# Patient Record
Sex: MALE | Race: WHITE | NOT HISPANIC OR LATINO | Employment: FULL TIME | URBAN - NONMETROPOLITAN AREA
[De-identification: names, ages, dates, MRNs, and addresses within clinical notes are randomized per-mention and may not be internally consistent; named-entity substitution may affect disease eponyms.]

---

## 2017-05-12 ENCOUNTER — APPOINTMENT (OUTPATIENT)
Dept: LAB | Facility: HOSPITAL | Age: 42
End: 2017-05-12
Attending: INTERNAL MEDICINE

## 2017-05-12 ENCOUNTER — TRANSCRIBE ORDERS (OUTPATIENT)
Dept: ADMINISTRATIVE | Facility: HOSPITAL | Age: 42
End: 2017-05-12

## 2017-05-12 DIAGNOSIS — Z00.00 ENCOUNTER FOR GENERAL ADULT MEDICAL EXAMINATION WITHOUT ABNORMAL FINDINGS: Primary | ICD-10-CM

## 2017-05-12 LAB
ALBUMIN SERPL-MCNC: 4.2 G/DL (ref 3.5–5)
ALBUMIN/GLOB SERPL: 1.2 G/DL (ref 1.1–2.5)
ALP SERPL-CCNC: 71 U/L (ref 24–120)
ALT SERPL W P-5'-P-CCNC: 83 U/L (ref 0–54)
ANION GAP SERPL CALCULATED.3IONS-SCNC: 12 MMOL/L (ref 4–13)
ARTICHOKE IGE QN: 107 MG/DL (ref 0–99)
AST SERPL-CCNC: 83 U/L (ref 7–45)
BILIRUB SERPL-MCNC: 0.7 MG/DL (ref 0.1–1)
BUN BLD-MCNC: 15 MG/DL (ref 5–21)
BUN/CREAT SERPL: 12.2 (ref 7–25)
CALCIUM SPEC-SCNC: 9.6 MG/DL (ref 8.4–10.4)
CHLORIDE SERPL-SCNC: 107 MMOL/L (ref 98–110)
CHOLEST SERPL-MCNC: 185 MG/DL (ref 130–200)
CO2 SERPL-SCNC: 24 MMOL/L (ref 24–31)
CREAT BLD-MCNC: 1.23 MG/DL (ref 0.5–1.4)
DEPRECATED RDW RBC AUTO: 42.8 FL (ref 40–54)
ERYTHROCYTE [DISTWIDTH] IN BLOOD BY AUTOMATED COUNT: 12.8 % (ref 12–15)
GFR SERPL CREATININE-BSD FRML MDRD: 65 ML/MIN/1.73
GLOBULIN UR ELPH-MCNC: 3.5 GM/DL
GLUCOSE BLD-MCNC: 107 MG/DL (ref 70–100)
HBA1C MFR BLD: 5.5 %
HCT VFR BLD AUTO: 40.8 % (ref 40–52)
HDLC SERPL-MCNC: 28 MG/DL
HGB BLD-MCNC: 14 G/DL (ref 14–18)
LDLC/HDLC SERPL: 4.37 {RATIO}
MCH RBC QN AUTO: 31.8 PG (ref 28–32)
MCHC RBC AUTO-ENTMCNC: 34.3 G/DL (ref 33–36)
MCV RBC AUTO: 92.7 FL (ref 82–95)
PLATELET # BLD AUTO: 197 10*3/MM3 (ref 130–400)
PMV BLD AUTO: 11 FL (ref 6–12)
POTASSIUM BLD-SCNC: 4.3 MMOL/L (ref 3.5–5.3)
PROT SERPL-MCNC: 7.7 G/DL (ref 6.3–8.7)
RBC # BLD AUTO: 4.4 10*6/MM3 (ref 4.8–5.9)
SODIUM BLD-SCNC: 143 MMOL/L (ref 135–145)
T3FREE SERPL-MCNC: 3.57 PG/ML (ref 2.77–5.27)
T4 FREE SERPL-MCNC: 0.64 NG/DL (ref 0.78–2.19)
TRIGL SERPL-MCNC: 173 MG/DL (ref 0–149)
TSH SERPL DL<=0.05 MIU/L-ACNC: 1.8 MIU/ML (ref 0.47–4.68)
WBC NRBC COR # BLD: 7.09 10*3/MM3 (ref 4.8–10.8)

## 2017-05-12 PROCEDURE — 84481 FREE ASSAY (FT-3): CPT | Performed by: INTERNAL MEDICINE

## 2017-05-12 PROCEDURE — 36415 COLL VENOUS BLD VENIPUNCTURE: CPT | Performed by: INTERNAL MEDICINE

## 2017-05-12 PROCEDURE — 85027 COMPLETE CBC AUTOMATED: CPT | Performed by: INTERNAL MEDICINE

## 2017-05-12 PROCEDURE — 80061 LIPID PANEL: CPT | Performed by: INTERNAL MEDICINE

## 2017-05-12 PROCEDURE — 80053 COMPREHEN METABOLIC PANEL: CPT | Performed by: INTERNAL MEDICINE

## 2017-05-12 PROCEDURE — 83036 HEMOGLOBIN GLYCOSYLATED A1C: CPT | Performed by: INTERNAL MEDICINE

## 2017-05-12 PROCEDURE — 84439 ASSAY OF FREE THYROXINE: CPT | Performed by: INTERNAL MEDICINE

## 2017-05-12 PROCEDURE — 84443 ASSAY THYROID STIM HORMONE: CPT | Performed by: INTERNAL MEDICINE

## 2017-12-06 ENCOUNTER — TRANSCRIBE ORDERS (OUTPATIENT)
Dept: ADMINISTRATIVE | Facility: HOSPITAL | Age: 42
End: 2017-12-06

## 2017-12-06 ENCOUNTER — APPOINTMENT (OUTPATIENT)
Dept: LAB | Facility: HOSPITAL | Age: 42
End: 2017-12-06
Attending: INTERNAL MEDICINE

## 2017-12-06 DIAGNOSIS — Z00.01 ENCOUNTER FOR GENERAL ADULT MEDICAL EXAMINATION WITH ABNORMAL FINDINGS: ICD-10-CM

## 2017-12-06 DIAGNOSIS — E29.1 TESTICULAR HYPOFUNCTION: Primary | ICD-10-CM

## 2017-12-06 LAB
ALBUMIN SERPL-MCNC: 4.4 G/DL (ref 3.5–5)
ALBUMIN/GLOB SERPL: 1.3 G/DL (ref 1.1–2.5)
ALP SERPL-CCNC: 68 U/L (ref 24–120)
ALT SERPL W P-5'-P-CCNC: 112 U/L (ref 0–54)
ANION GAP SERPL CALCULATED.3IONS-SCNC: 6 MMOL/L (ref 4–13)
ARTICHOKE IGE QN: 133 MG/DL (ref 0–99)
AST SERPL-CCNC: 95 U/L (ref 7–45)
BILIRUB SERPL-MCNC: 0.6 MG/DL (ref 0.1–1)
BUN BLD-MCNC: 18 MG/DL (ref 5–21)
BUN/CREAT SERPL: 14.2 (ref 7–25)
CALCIUM SPEC-SCNC: 9.6 MG/DL (ref 8.4–10.4)
CHLORIDE SERPL-SCNC: 105 MMOL/L (ref 98–110)
CHOLEST SERPL-MCNC: 191 MG/DL (ref 130–200)
CO2 SERPL-SCNC: 32 MMOL/L (ref 24–31)
CREAT BLD-MCNC: 1.27 MG/DL (ref 0.5–1.4)
DEPRECATED RDW RBC AUTO: 41.9 FL (ref 40–54)
ERYTHROCYTE [DISTWIDTH] IN BLOOD BY AUTOMATED COUNT: 12 % (ref 12–15)
GFR SERPL CREATININE-BSD FRML MDRD: 62 ML/MIN/1.73
GLOBULIN UR ELPH-MCNC: 3.4 GM/DL
GLUCOSE BLD-MCNC: 106 MG/DL (ref 70–100)
HCT VFR BLD AUTO: 38.3 % (ref 40–52)
HDLC SERPL-MCNC: 27 MG/DL
HGB BLD-MCNC: 13.1 G/DL (ref 14–18)
LDLC/HDLC SERPL: 4.62 {RATIO}
MCH RBC QN AUTO: 32.2 PG (ref 28–32)
MCHC RBC AUTO-ENTMCNC: 34.2 G/DL (ref 33–36)
MCV RBC AUTO: 94.1 FL (ref 82–95)
PLATELET # BLD AUTO: 216 10*3/MM3 (ref 130–400)
PMV BLD AUTO: 10.9 FL (ref 6–12)
POTASSIUM BLD-SCNC: 4.4 MMOL/L (ref 3.5–5.3)
PROT SERPL-MCNC: 7.8 G/DL (ref 6.3–8.7)
PSA SERPL-MCNC: 0.64 NG/ML (ref 0–4)
RBC # BLD AUTO: 4.07 10*6/MM3 (ref 4.8–5.9)
SODIUM BLD-SCNC: 143 MMOL/L (ref 135–145)
T3FREE SERPL-MCNC: 3.6 PG/ML (ref 2.77–5.27)
T4 FREE SERPL-MCNC: 0.65 NG/DL (ref 0.78–2.19)
TRIGL SERPL-MCNC: 196 MG/DL (ref 0–149)
TSH SERPL DL<=0.05 MIU/L-ACNC: 2.52 MIU/ML (ref 0.47–4.68)
WBC NRBC COR # BLD: 6.52 10*3/MM3 (ref 4.8–10.8)

## 2017-12-06 PROCEDURE — 84403 ASSAY OF TOTAL TESTOSTERONE: CPT | Performed by: INTERNAL MEDICINE

## 2017-12-06 PROCEDURE — 84481 FREE ASSAY (FT-3): CPT | Performed by: INTERNAL MEDICINE

## 2017-12-06 PROCEDURE — 84443 ASSAY THYROID STIM HORMONE: CPT | Performed by: INTERNAL MEDICINE

## 2017-12-06 PROCEDURE — 84153 ASSAY OF PSA TOTAL: CPT | Performed by: INTERNAL MEDICINE

## 2017-12-06 PROCEDURE — 85027 COMPLETE CBC AUTOMATED: CPT | Performed by: INTERNAL MEDICINE

## 2017-12-06 PROCEDURE — 80061 LIPID PANEL: CPT | Performed by: INTERNAL MEDICINE

## 2017-12-06 PROCEDURE — 84439 ASSAY OF FREE THYROXINE: CPT | Performed by: INTERNAL MEDICINE

## 2017-12-06 PROCEDURE — 80053 COMPREHEN METABOLIC PANEL: CPT | Performed by: INTERNAL MEDICINE

## 2017-12-06 PROCEDURE — 36415 COLL VENOUS BLD VENIPUNCTURE: CPT

## 2017-12-07 LAB — TESTOST SERPL-MCNC: 393 NG/DL (ref 264–916)

## 2017-12-08 ENCOUNTER — TRANSCRIBE ORDERS (OUTPATIENT)
Dept: ADMINISTRATIVE | Facility: HOSPITAL | Age: 42
End: 2017-12-08

## 2017-12-08 DIAGNOSIS — R94.5 ABNORMAL RESULTS OF LIVER FUNCTION STUDIES: ICD-10-CM

## 2017-12-08 DIAGNOSIS — J45.30 MILD PERSISTENT ASTHMA, UNCOMPLICATED: Primary | ICD-10-CM

## 2017-12-26 ENCOUNTER — HOSPITAL ENCOUNTER (OUTPATIENT)
Dept: PULMONOLOGY | Facility: HOSPITAL | Age: 42
Discharge: HOME | End: 2017-12-26
Attending: INTERNAL MEDICINE | Admitting: INTERNAL MEDICINE

## 2017-12-26 ENCOUNTER — HOSPITAL ENCOUNTER (OUTPATIENT)
Dept: ULTRASOUND IMAGING | Facility: HOSPITAL | Age: 42
Discharge: HOME | End: 2017-12-26
Attending: INTERNAL MEDICINE

## 2017-12-26 VITALS — WEIGHT: 284 LBS | HEIGHT: 73 IN | BODY MASS INDEX: 37.64 KG/M2

## 2017-12-26 DIAGNOSIS — J45.30 MILD PERSISTENT ASTHMA, UNCOMPLICATED: ICD-10-CM

## 2017-12-26 DIAGNOSIS — R94.5 ABNORMAL RESULTS OF LIVER FUNCTION STUDIES: ICD-10-CM

## 2017-12-26 PROCEDURE — 94729 DIFFUSING CAPACITY: CPT

## 2017-12-26 PROCEDURE — 94010 BREATHING CAPACITY TEST: CPT

## 2017-12-26 PROCEDURE — 76705 ECHO EXAM OF ABDOMEN: CPT

## 2017-12-26 PROCEDURE — 94726 PLETHYSMOGRAPHY LUNG VOLUMES: CPT

## 2018-05-01 ENCOUNTER — TRANSCRIBE ORDERS (OUTPATIENT)
Dept: LAB | Facility: HOSPITAL | Age: 43
End: 2018-05-01

## 2018-05-01 ENCOUNTER — HOSPITAL ENCOUNTER (OUTPATIENT)
Dept: GENERAL RADIOLOGY | Facility: HOSPITAL | Age: 43
Discharge: HOME | End: 2018-05-01
Attending: INTERNAL MEDICINE | Admitting: INTERNAL MEDICINE

## 2018-05-01 DIAGNOSIS — J45.30 MILD PERSISTENT ASTHMA, UNSPECIFIED WHETHER COMPLICATED: Primary | ICD-10-CM

## 2018-05-01 DIAGNOSIS — J45.30 MILD PERSISTENT ASTHMA, UNSPECIFIED WHETHER COMPLICATED: ICD-10-CM

## 2018-05-01 PROCEDURE — 71046 X-RAY EXAM CHEST 2 VIEWS: CPT

## 2018-08-03 ENCOUNTER — OFFICE VISIT (OUTPATIENT)
Dept: ENDOCRINOLOGY | Facility: CLINIC | Age: 43
End: 2018-08-03

## 2018-08-03 ENCOUNTER — APPOINTMENT (OUTPATIENT)
Dept: LAB | Facility: HOSPITAL | Age: 43
End: 2018-08-03

## 2018-08-03 VITALS
OXYGEN SATURATION: 95 % | DIASTOLIC BLOOD PRESSURE: 86 MMHG | SYSTOLIC BLOOD PRESSURE: 138 MMHG | WEIGHT: 281.1 LBS | HEART RATE: 84 BPM | BODY MASS INDEX: 37.25 KG/M2 | HEIGHT: 73 IN

## 2018-08-03 DIAGNOSIS — I10 ESSENTIAL HYPERTENSION: ICD-10-CM

## 2018-08-03 DIAGNOSIS — E23.0 HYPOGONADOTROPIC HYPOGONADISM (HCC): Primary | ICD-10-CM

## 2018-08-03 DIAGNOSIS — E03.8 CENTRAL HYPOTHYROIDISM: ICD-10-CM

## 2018-08-03 DIAGNOSIS — E78.00 HYPERCHOLESTEROLEMIA: ICD-10-CM

## 2018-08-03 DIAGNOSIS — E23.0 HYPOPITUITARISM (HCC): ICD-10-CM

## 2018-08-03 PROBLEM — E29.1 HYPOGONADISM IN MALE: Status: ACTIVE | Noted: 2018-08-03

## 2018-08-03 LAB
ALBUMIN SERPL-MCNC: 4.9 G/DL (ref 3.4–4.8)
ALBUMIN/GLOB SERPL: 1.3 G/DL (ref 1.1–1.8)
ALP SERPL-CCNC: 90 U/L (ref 38–126)
ALT SERPL W P-5'-P-CCNC: 95 U/L (ref 21–72)
ANION GAP SERPL CALCULATED.3IONS-SCNC: 10 MMOL/L (ref 5–15)
AST SERPL-CCNC: 84 U/L (ref 17–59)
BASOPHILS # BLD AUTO: 0.05 10*3/MM3 (ref 0–0.2)
BASOPHILS NFR BLD AUTO: 0.7 % (ref 0–2)
BILIRUB SERPL-MCNC: 0.6 MG/DL (ref 0.2–1.3)
BUN BLD-MCNC: 14 MG/DL (ref 7–21)
BUN/CREAT SERPL: 13.6 (ref 7–25)
CALCIUM SPEC-SCNC: 9.8 MG/DL (ref 8.4–10.2)
CHLORIDE SERPL-SCNC: 104 MMOL/L (ref 95–110)
CO2 SERPL-SCNC: 28 MMOL/L (ref 22–31)
CORTIS AM PEAK SERPL-MCNC: 5.79 MCG/DL (ref 4.46–22.7)
CREAT BLD-MCNC: 1.03 MG/DL (ref 0.7–1.3)
DEPRECATED RDW RBC AUTO: 46.3 FL (ref 35.1–43.9)
EOSINOPHIL # BLD AUTO: 0.22 10*3/MM3 (ref 0–0.7)
EOSINOPHIL NFR BLD AUTO: 3.3 % (ref 0–7)
ERYTHROCYTE [DISTWIDTH] IN BLOOD BY AUTOMATED COUNT: 13.5 % (ref 11.5–14.5)
FSH SERPL-ACNC: 4.2 MIU/ML (ref 1.5–9.7)
GFR SERPL CREATININE-BSD FRML MDRD: 79 ML/MIN/1.73 (ref 63–147)
GLOBULIN UR ELPH-MCNC: 3.8 GM/DL (ref 2.3–3.5)
GLUCOSE BLD-MCNC: 104 MG/DL (ref 60–100)
HCT VFR BLD AUTO: 39.8 % (ref 39–49)
HGB BLD-MCNC: 13.7 G/DL (ref 13.7–17.3)
IMM GRANULOCYTES # BLD: 0.02 10*3/MM3 (ref 0–0.02)
IMM GRANULOCYTES NFR BLD: 0.3 % (ref 0–0.5)
LH SERPL-ACNC: 5.35 MIU/ML
LYMPHOCYTES # BLD AUTO: 1.71 10*3/MM3 (ref 0.6–4.2)
LYMPHOCYTES NFR BLD AUTO: 25.4 % (ref 10–50)
MCH RBC QN AUTO: 32.2 PG (ref 26.5–34)
MCHC RBC AUTO-ENTMCNC: 34.4 G/DL (ref 31.5–36.3)
MCV RBC AUTO: 93.6 FL (ref 80–98)
MONOCYTES # BLD AUTO: 0.75 10*3/MM3 (ref 0–0.9)
MONOCYTES NFR BLD AUTO: 11.2 % (ref 0–12)
NEUTROPHILS # BLD AUTO: 3.97 10*3/MM3 (ref 2–8.6)
NEUTROPHILS NFR BLD AUTO: 59.1 % (ref 37–80)
PLATELET # BLD AUTO: 254 10*3/MM3 (ref 150–450)
PMV BLD AUTO: 10.6 FL (ref 8–12)
POTASSIUM BLD-SCNC: 3.9 MMOL/L (ref 3.5–5.1)
PROT SERPL-MCNC: 8.7 G/DL (ref 6.3–8.6)
RBC # BLD AUTO: 4.25 10*6/MM3 (ref 4.37–5.74)
SODIUM BLD-SCNC: 142 MMOL/L (ref 137–145)
T4 FREE SERPL-MCNC: 0.64 NG/DL (ref 0.78–2.19)
TSH SERPL DL<=0.05 MIU/L-ACNC: 1.13 MIU/ML (ref 0.46–4.68)
WBC NRBC COR # BLD: 6.72 10*3/MM3 (ref 3.2–9.8)

## 2018-08-03 PROCEDURE — 80053 COMPREHEN METABOLIC PANEL: CPT | Performed by: INTERNAL MEDICINE

## 2018-08-03 PROCEDURE — 83002 ASSAY OF GONADOTROPIN (LH): CPT | Performed by: INTERNAL MEDICINE

## 2018-08-03 PROCEDURE — 84146 ASSAY OF PROLACTIN: CPT | Performed by: INTERNAL MEDICINE

## 2018-08-03 PROCEDURE — 82670 ASSAY OF TOTAL ESTRADIOL: CPT | Performed by: INTERNAL MEDICINE

## 2018-08-03 PROCEDURE — 82024 ASSAY OF ACTH: CPT | Performed by: INTERNAL MEDICINE

## 2018-08-03 PROCEDURE — 84439 ASSAY OF FREE THYROXINE: CPT | Performed by: INTERNAL MEDICINE

## 2018-08-03 PROCEDURE — 84305 ASSAY OF SOMATOMEDIN: CPT | Performed by: INTERNAL MEDICINE

## 2018-08-03 PROCEDURE — 99205 OFFICE O/P NEW HI 60 MIN: CPT | Performed by: INTERNAL MEDICINE

## 2018-08-03 PROCEDURE — 84443 ASSAY THYROID STIM HORMONE: CPT | Performed by: INTERNAL MEDICINE

## 2018-08-03 PROCEDURE — 84410 TESTOSTERONE BIOAVAILABLE: CPT | Performed by: INTERNAL MEDICINE

## 2018-08-03 PROCEDURE — 82627 DEHYDROEPIANDROSTERONE: CPT | Performed by: INTERNAL MEDICINE

## 2018-08-03 PROCEDURE — 36415 COLL VENOUS BLD VENIPUNCTURE: CPT | Performed by: INTERNAL MEDICINE

## 2018-08-03 PROCEDURE — 83001 ASSAY OF GONADOTROPIN (FSH): CPT | Performed by: INTERNAL MEDICINE

## 2018-08-03 PROCEDURE — 82533 TOTAL CORTISOL: CPT | Performed by: INTERNAL MEDICINE

## 2018-08-03 PROCEDURE — 85025 COMPLETE CBC W/AUTO DIFF WBC: CPT | Performed by: INTERNAL MEDICINE

## 2018-08-03 RX ORDER — ESOMEPRAZOLE MAGNESIUM 40 MG/1
40 CAPSULE, DELAYED RELEASE ORAL
COMMUNITY

## 2018-08-03 RX ORDER — ATORVASTATIN CALCIUM 20 MG/1
20 TABLET, FILM COATED ORAL DAILY
COMMUNITY

## 2018-08-03 RX ORDER — CLONIDINE HYDROCHLORIDE 0.3 MG/1
0.3 TABLET ORAL 2 TIMES DAILY
COMMUNITY
End: 2020-07-28 | Stop reason: SDUPTHER

## 2018-08-03 RX ORDER — MONTELUKAST SODIUM 10 MG/1
10 TABLET ORAL NIGHTLY
COMMUNITY

## 2018-08-03 RX ORDER — OXCARBAZEPINE 150 MG/1
150 TABLET, FILM COATED ORAL 2 TIMES DAILY
COMMUNITY
End: 2021-08-27

## 2018-08-03 RX ORDER — FLUOXETINE HYDROCHLORIDE 20 MG/1
40 CAPSULE ORAL DAILY
COMMUNITY

## 2018-08-03 RX ORDER — LOSARTAN POTASSIUM 100 MG/1
100 TABLET ORAL DAILY
COMMUNITY

## 2018-08-03 NOTE — PROGRESS NOTES
Carlos Mena is a 42 y.o. male who presents for  evaluation of   Chief Complaint   Patient presents with   • low testosterone       Referring provider    Moreno Sims MD  [address] [place] [address] [address]  [place] [address]    Primary Care Provider    Judson Gonzalez MD    42 y o male    Hypogonadism    Duration, since 2016    Timing, constant    Severity, moderate    Symptoms , Fatigue, ED, low libido , weakness    Alleviating, Clomid     Has had pituitary MRI that was negative       Past Medical History:   Diagnosis Date   • Essential hypertension 8/3/2018   • Hypercholesterolemia 8/3/2018   • Hypogonadism in male 8/3/2018     Family History   Problem Relation Age of Onset   • Diabetes Mother      Social History   Substance Use Topics   • Smoking status: Former Smoker   • Smokeless tobacco: Never Used   • Alcohol use Yes      Comment: social         Current Outpatient Prescriptions:   •  atorvastatin (LIPITOR) 20 MG tablet, Take 20 mg by mouth Daily., Disp: , Rfl:   •  CloNIDine (CATAPRES) 0.3 MG tablet, Take 0.3 mg by mouth 2 (Two) Times a Day., Disp: , Rfl:   •  esomeprazole (nexIUM) 40 MG capsule, Take 40 mg by mouth Every Morning Before Breakfast., Disp: , Rfl:   •  FLUoxetine (PROzac) 20 MG capsule, Take 40 mg by mouth Daily., Disp: , Rfl:   •  losartan (COZAAR) 100 MG tablet, Take 100 mg by mouth Daily., Disp: , Rfl:   •  montelukast (SINGULAIR) 10 MG tablet, Take 10 mg by mouth Every Night., Disp: , Rfl:   •  Multiple Vitamins-Minerals (MULTIVITAMIN ADULT PO), Take 1 tablet by mouth Daily., Disp: , Rfl:   •  OXcarbazepine (TRILEPTAL) 150 MG tablet, Take 150 mg by mouth 2 (Two) Times a Day., Disp: , Rfl:   •  clomiPHENE (CLOMID) 50 MG tablet, Take 1 tablet by mouth Daily. Half a tablet daily, Disp: 15 tablet, Rfl: 5    Review of Systems    Review of Systems   Constitutional: Positive for fatigue. Negative for activity change, appetite change, chills, diaphoresis, fever and unexpected weight change.  "  HENT: Negative for congestion, dental problem, drooling, ear discharge, ear pain, facial swelling, mouth sores, postnasal drip, rhinorrhea, sinus pressure, sore throat, tinnitus, trouble swallowing and voice change.    Eyes: Negative for photophobia, pain, discharge, redness, itching and visual disturbance.   Respiratory: Negative for apnea, cough, choking, chest tightness, shortness of breath, wheezing and stridor.    Cardiovascular: Negative for chest pain, palpitations and leg swelling.   Gastrointestinal: Negative for abdominal distention, abdominal pain, constipation, diarrhea, nausea and vomiting.   Endocrine: Negative for cold intolerance, heat intolerance, polydipsia, polyphagia and polyuria.   Genitourinary: Negative for decreased urine volume, difficulty urinating, dysuria, flank pain, frequency, hematuria and urgency.   Musculoskeletal: Negative for arthralgias, back pain, gait problem, joint swelling, myalgias, neck pain and neck stiffness.   Skin: Negative for color change, pallor, rash and wound.   Allergic/Immunologic: Negative for immunocompromised state.   Neurological: Positive for weakness. Negative for dizziness, tremors, seizures, syncope, facial asymmetry, speech difficulty, light-headedness, numbness and headaches.   Hematological: Negative for adenopathy.   Psychiatric/Behavioral: Negative for agitation, behavioral problems, confusion, decreased concentration, dysphoric mood, hallucinations, self-injury, sleep disturbance and suicidal ideas. The patient is not nervous/anxious and is not hyperactive.         Objective:   /86   Pulse 84   Ht 184.2 cm (72.5\")   Wt 128 kg (281 lb 1.6 oz)   SpO2 95%   BMI 37.60 kg/m²     Physical Exam   Constitutional: He is oriented to person, place, and time. He appears well-developed and well-nourished. He is cooperative.   HENT:   Head: Normocephalic and atraumatic.   Right Ear: External ear normal.   Left Ear: External ear normal.   Nose: Nose " normal.   Mouth/Throat: Oropharynx is clear and moist. No oropharyngeal exudate.   Eyes: Pupils are equal, round, and reactive to light. Conjunctivae and EOM are normal. No scleral icterus. Right eye exhibits normal extraocular motion. Left eye exhibits normal extraocular motion.   Neck: Neck supple. No JVD present. No muscular tenderness present. No tracheal deviation, no edema and no erythema present. No thyromegaly present.   Cardiovascular: Normal rate, regular rhythm, normal heart sounds and intact distal pulses.  Exam reveals no gallop and no friction rub.    No murmur heard.  Pulmonary/Chest: Effort normal and breath sounds normal. No stridor. No respiratory distress. He has no decreased breath sounds. He has no wheezes. He has no rhonchi. He has no rales. He exhibits no tenderness.   Abdominal: Soft. Bowel sounds are normal. He exhibits no distension and no mass. There is no hepatomegaly. There is no tenderness. There is no rebound and no guarding. No hernia.   Musculoskeletal: Normal range of motion. He exhibits no edema, tenderness or deformity.   Lymphadenopathy:     He has no cervical adenopathy.   Neurological: He is alert and oriented to person, place, and time. He has normal reflexes. No cranial nerve deficit. He exhibits normal muscle tone. Coordination normal.   Skin: Skin is warm. No rash noted. No erythema. No pallor.   Psychiatric: He has a normal mood and affect. His behavior is normal. Judgment and thought content normal.   Nursing note and vitals reviewed.      Lab Review          Assessment/Plan       ICD-10-CM ICD-9-CM   1. 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 Essential hypertension I10 401.9   3. 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 Hypopituitarism (CMS/HCC) E23.0 253.2         I reviewed and summarized records from Judson Gonzalez MD from 2018 and I reviewed / ordered labs.   From review of records :    He has hypogonadism  since 2016 treated with clomiphene. Presently off treatment    ====    Male Hypogonadism    Restart clomiphene 25 mg every day     Start every other day but may increase every day       1) Patient is male and 18 years of age or older - YES       2) Patient has two (2) morning pre-treatment testosterone levels below the lower limit of the normal testosterone reference range of the individual laboratory used -YES       Testosteroner from July 2016 was 269 ng per dl ( nl 348 to 1197) and 222 from Jan 2016    Lab Results   Component Value Date    TESTFRE 11.6 09/11/2014       Lab Results   Component Value Date    TESTOSTEROTT 393 12/06/2017    TESTOSTEROTT 539 04/05/2016    TESTOSTEROTT 362 09/11/2014             3) Patient has primary hypogonadism or hypogonadotropic hypogonadism (further defined below) YES        Primary hypogonadism (congenital or acquired) caused by testicular failure due to one of the following: cryptorchidism, bilateral torsion, orchitis, vanishing testes syndrome, orchiectomy, Klinefelter’s syndrome, chemotherapy, toxic damage from alcohol or heavy metals NO        Hypogonadotropic hypogonadism: idiopathic gonadotropin or luteinizing hormone-releasing (LHRH) deficiency, pituitary-hypothalamic injury from tumors, trauma, or radiation YES     In his case idiopathic    I ruled out elevated prolactin, No results found for: PROLACTIN    I ruled out Hemochromatosis, No results found for: IRON, TIBC, FERRITIN        4) Patient has symptoms of hypogonadism - YES , this include low libido, loss of muscle mass and strength, impotence, shaving less often, depressive symptoms       5) Patient does not have: Breast or prostate cancer  - DOES NOT HAVE    Palpable prostate nodule or prostate-specific antigen (PSA) > 4ng/mL or an increase in PSA of more than 1.4 ng/ml in the past 12 months   DOES NOT HAVE   Lab Results   Component Value Date    PSA 0.640 12/06/2017         Hematocrit > 50% to start or > 54% to  continue  DOES NOT HAVE    Lab Results   Component Value Date    HGB 13.1 (L) 12/06/2017    HGB 14.0 05/12/2017    HGB 14.9 05/05/2016       Lab Results   Component Value Date    HCT 38.3 (L) 12/06/2017    HCT 40.8 05/12/2017    HCT 41.7 05/05/2016         Untreated severe obstructive sleep apnea -- on treatment     Severe lower urinary tract symptoms DOES NOT HAVE     Uncontrolled or poorly controlled heart failure DOES NOT HAVE     ===================    Central Hypothyroidism    Lab Results   Component Value Date    TSH 2.520 12/06/2017    TSH 1.800 05/12/2017    TSH 2.56 09/11/2014       Lab Results   Component Value Date    FREET4 0.65 (L) 12/06/2017    FREET4 0.64 (L) 05/12/2017       No results found for: N1HXGYV    No results found for: P5RFNNA     Thyroid Antibodies  TPO AB  No results found for: THYROIDAB    Thyroid Stimulating Immunoglobulin    No results found for: LABTHYR    No results found for: THYRSTIMIMMU     Anticipate starting levothyroxine but wait for labs    ----    Do full pituitary assessment      I need pituitary MRI , has central hypothyroidism and central hypogonadism               Orders Placed This Encounter   Procedures   • MRI Pituitary With & Without Contrast     Scheduling Instructions:      Rule out adenoma, pt has hypopituitarism            Do at [facility]   • ACTH   • Cortisol - AM   • DHEA-Sulfate   • FSH & LH   • Estradiol   • Insulin-like Growth Factor   • Prolactin   • T4, Free   • Testosterone, Bioavailable (M)   • TSH   • Comprehensive Metabolic Panel   • CBC & Differential     Order Specific Question:   Manual Differential     Answer:   No         A copy of my note was sent to Judson Gonzalez MD    Please see my above opinion and suggestions.       MDM  Number of Diagnoses or Management Options  Central hypothyroidism: new, needed workup  Essential hypertension:   Hypercholesterolemia:   Hypogonadotropic hypogonadism (CMS/HCC): new, needed workup  Hypopituitarism  (CMS/[facility]): new, needed workup     Amount and/or Complexity of Data Reviewed  Clinical lab tests: ordered and reviewed  Tests in the radiology section of CPT®: ordered  Review and summarize past medical records: yes    Risk of Complications, Morbidity, and/or Mortality  Presenting problems: moderate  Diagnostic procedures: minimal  Management options: moderate

## 2018-08-04 LAB
DHEA-S SERPL-MCNC: 79.5 UG/DL (ref 102.6–416.3)
ESTRADIOL SERPL HS-MCNC: 14.5 PG/ML (ref 7.6–42.6)
IGF-I SERPL-MCNC: 138 NG/ML (ref 75–216)
PROLACTIN SERPL-MCNC: 8.9 NG/ML (ref 4–15.2)

## 2018-08-05 DIAGNOSIS — E03.8 CENTRAL HYPOTHYROIDISM: Primary | ICD-10-CM

## 2018-08-05 DIAGNOSIS — E27.40 ADRENAL INSUFFICIENCY (HCC): ICD-10-CM

## 2018-08-05 RX ORDER — LEVOTHYROXINE SODIUM 0.03 MG/1
25 TABLET ORAL DAILY
Qty: 30 TABLET | Refills: 11 | Status: SHIPPED | OUTPATIENT
Start: 2018-08-05 | End: 2018-12-05

## 2018-08-06 ENCOUNTER — TELEPHONE (OUTPATIENT)
Dept: ENDOCRINOLOGY | Facility: CLINIC | Age: 43
End: 2018-08-06

## 2018-08-06 LAB — ACTH PLAS-MCNC: 12 PG/ML (ref 7.2–63.3)

## 2018-08-06 NOTE — TELEPHONE ENCOUNTER
FRANKI HUNTER Key: [id number] - PA Case ID: [id number] - Rx #: [id number] Need help? Call us at [phone]   Status   Sent to [facility]   DrugClomiPHENE Citrate 50MG OR TABS   FormFEP Electronic PA Form (NCPDP)   Original Claim Info75 FOR OVERRIDE HAVE MD CALL [phone] REQUIRES PRIOR AUTHORIZATION

## 2018-08-08 LAB
BIOAVAILABLE TESTOSTERONE, %: 49.2 %
BIOAVAILABLE TESTOSTERONE, S: 118 NG/DL
TESTOST SERPL-MCNC: 240 NG/DL

## 2018-08-09 DIAGNOSIS — E03.8 CENTRAL HYPOTHYROIDISM: Primary | ICD-10-CM

## 2018-08-09 DIAGNOSIS — E23.0 HYPOGONADOTROPIC HYPOGONADISM (HCC): ICD-10-CM

## 2018-08-17 ENCOUNTER — HOSPITAL ENCOUNTER (OUTPATIENT)
Dept: MRI IMAGING | Facility: HOSPITAL | Age: 43
Discharge: HOME | End: 2018-08-17
Admitting: INTERNAL MEDICINE

## 2018-08-17 PROCEDURE — 70553 MRI BRAIN STEM W/O & W/DYE: CPT

## 2018-08-17 PROCEDURE — 0 GADOBENATE DIMEGLUMINE 529 MG/ML SOLUTION: Performed by: INTERNAL MEDICINE

## 2018-08-17 PROCEDURE — 82565 ASSAY OF CREATININE: CPT

## 2018-08-17 PROCEDURE — A9577 INJ MULTIHANCE: HCPCS | Performed by: INTERNAL MEDICINE

## 2018-08-17 RX ADMIN — GADOBENATE DIMEGLUMINE 20 ML: 529 INJECTION, SOLUTION INTRAVENOUS at 14:05

## 2018-08-20 LAB — CREAT BLDA-MCNC: 1.2 MG/DL (ref 0.6–1.3)

## 2018-11-17 PROBLEM — J45.40 MODERATE PERSISTENT ASTHMA WITHOUT COMPLICATION: Status: ACTIVE | Noted: 2018-11-17

## 2018-11-17 PROBLEM — K21.9 GASTROESOPHAGEAL REFLUX DISEASE WITHOUT ESOPHAGITIS: Status: ACTIVE | Noted: 2018-11-17

## 2018-11-17 PROBLEM — E07.9 THYROID DISEASE: Status: ACTIVE | Noted: 2018-11-17

## 2018-11-17 NOTE — PROGRESS NOTES
MICHELLE Hanna  [facility]   [facility]  [address] [place] [address]  Phone: [phone]  Fax: [phone]     Carlos Mena is a 43 y.o. male.   CC:   Chief Complaint   Patient presents with   • Asthma        HPI: Mr. Mena's coming in today for follow-up of his asthma.  He routinely sees Dr. Troy.  He treats him with our Arnuity maintenance medication and Proventil as needed.  He also suffers from reflux which is treated with Nexium and thyroid disease.  He does take Singulair.  Followed by Dr. Gonzalez who is his primary care provider.  Indicates he has not had to utilize his Arnuity or his Proventil in a few months.  His 18-year-old son just went through a kidney transplant and he was in the hospital with him and had forgotten to take the inhalers with him.  However he did not feel like he needed them anyway.  He remains on the Singulair and doing well with that.  No other complaints    The following portions of the patient's history were reviewed and updated as appropriate: allergies, current medications, past family history, past medical history, past social history, past surgical history and problem list.    Past Medical History:   Diagnosis Date   • Essential hypertension 8/3/2018   • Hypercholesterolemia 8/3/2018   • Hypogonadism in male 8/3/2018       Family History   Problem Relation Age of Onset   • Diabetes Mother        Social History     Socioeconomic History   • Marital status:      Spouse name: Not on file   • Number of children: Not on file   • Years of education: Not on file   • Highest education level: Not on file   Social Needs   • Financial resource strain: Not on file   • Food insecurity - worry: Not on file   • Food insecurity - inability: Not on file   • Transportation needs - medical: Not on file   • Transportation needs - non-medical: Not on file   Occupational History   • Not on file   Tobacco Use   • Smoking status: Former Smoker      Years: 4.00     Types: Cigarettes     Last attempt to quit: 2008     Years since quitting: 10.8   • Smokeless tobacco: Never Used   Substance and Sexual Activity   • Alcohol use: Yes     Comment: social   • Drug use: No   • Sexual activity: Defer   Other Topics Concern   • Not on file   Social History Narrative   • Not on file       Review of Systems   Constitutional: Negative for activity change, chills, fatigue and fever.   HENT: Negative for congestion, postnasal drip, rhinorrhea, sinus pressure, sore throat and trouble swallowing.    Eyes: Negative for blurred vision, double vision and pain.   Respiratory: Negative for cough, chest tightness, shortness of breath and wheezing.    Cardiovascular: Negative for chest pain, palpitations and leg swelling.   Gastrointestinal: Negative for abdominal distention, constipation, diarrhea, nausea and vomiting.   Endocrine: Negative for polydipsia, polyphagia and polyuria.   Genitourinary: Negative for dysuria, frequency and urgency.   Musculoskeletal: Negative for arthralgias, back pain, gait problem and joint swelling.   Skin: Negative for color change, dry skin, rash and skin lesions.   Allergic/Immunologic: Negative for environmental allergies, food allergies and immunocompromised state.   Neurological: Negative for dizziness, seizures, speech difficulty, weakness, light-headedness, memory problem and confusion.   Hematological: Negative for adenopathy. Does not bruise/bleed easily.   Psychiatric/Behavioral: Negative for sleep disturbance, negative for hyperactivity and depressed mood. The patient is not nervous/anxious.        Vitals:    11/19/18 0909   BP: 142/80   Pulse: 80   Resp: 16   SpO2: 98%       Physical Exam   Constitutional: He is oriented to person, place, and time. He appears well-developed and well-nourished. No distress.   HENT:   Head: Normocephalic and atraumatic.   Right Ear: External ear normal.   Left Ear: External ear normal.   Nose: Nose normal.    Mouth/Throat: Oropharynx is clear and moist. No oropharyngeal exudate.   Eyes: Conjunctivae and EOM are normal. Pupils are equal, round, and reactive to light. Right eye exhibits no discharge. Left eye exhibits no discharge.   Neck: Normal range of motion. Neck supple. No JVD present.   Cardiovascular: Normal rate and regular rhythm.   No murmur heard.  Pulmonary/Chest: Effort normal and breath sounds normal. No respiratory distress. He has no wheezes.   Abdominal: Soft. Bowel sounds are normal. He exhibits no distension. There is no tenderness.   Musculoskeletal: Normal range of motion. He exhibits no edema or deformity.   Neurological: He is alert and oriented to person, place, and time. He displays normal reflexes. No cranial nerve deficit. Coordination normal.   Skin: Skin is warm and dry. No rash noted. He is not diaphoretic. No erythema.   Psychiatric: He has a normal mood and affect. His behavior is normal. Thought content normal.   Nursing note and vitals reviewed.      Pulmonary Functions Testing Results:    No results found for: FEV1, FVC, MIL0BDU, TLC, DLCO  NONE FOR TODAY     CXR: None for today        Carlos was seen today for asthma.    Diagnoses and all orders for this visit:    Moderate persistent asthma without complication    Thyroid disease    Gastroesophageal reflux disease without esophagitis    Essential hypertension      Patient's Body mass index is 41.28 kg/m². BMI is above normal parameters. Recommendations include: referral to primary care.    Patient is where at step therapy.  If he begins utilizes Proventil more than 2 nights a week or 2 nights a month he will get back on the Arnuity.  If symptoms persist despite this regimen he will contact us sooner than his follow-up    Katya Ramos, MICHELLE  11/19/2018  9:23 AM    Return in about 6 months (around 5/19/2019) for FVL/CXR.

## 2018-11-19 ENCOUNTER — OFFICE VISIT (OUTPATIENT)
Dept: PULMONOLOGY | Facility: CLINIC | Age: 43
End: 2018-11-19

## 2018-11-19 VITALS
WEIGHT: 296 LBS | HEIGHT: 71 IN | HEART RATE: 80 BPM | BODY MASS INDEX: 41.44 KG/M2 | RESPIRATION RATE: 16 BRPM | OXYGEN SATURATION: 98 % | DIASTOLIC BLOOD PRESSURE: 80 MMHG | SYSTOLIC BLOOD PRESSURE: 142 MMHG

## 2018-11-19 DIAGNOSIS — I10 ESSENTIAL HYPERTENSION: ICD-10-CM

## 2018-11-19 DIAGNOSIS — K21.9 GASTROESOPHAGEAL REFLUX DISEASE WITHOUT ESOPHAGITIS: ICD-10-CM

## 2018-11-19 DIAGNOSIS — J45.40 MODERATE PERSISTENT ASTHMA WITHOUT COMPLICATION: Primary | ICD-10-CM

## 2018-11-19 DIAGNOSIS — E07.9 THYROID DISEASE: ICD-10-CM

## 2018-11-19 PROCEDURE — 99213 OFFICE O/P EST LOW 20 MIN: CPT | Performed by: NURSE PRACTITIONER

## 2018-12-05 ENCOUNTER — LAB (OUTPATIENT)
Dept: LAB | Facility: HOSPITAL | Age: 43
End: 2018-12-05

## 2018-12-05 ENCOUNTER — OFFICE VISIT (OUTPATIENT)
Dept: ENDOCRINOLOGY | Facility: CLINIC | Age: 43
End: 2018-12-05

## 2018-12-05 ENCOUNTER — APPOINTMENT (OUTPATIENT)
Dept: LAB | Facility: HOSPITAL | Age: 43
End: 2018-12-05

## 2018-12-05 VITALS
SYSTOLIC BLOOD PRESSURE: 136 MMHG | OXYGEN SATURATION: 97 % | BODY MASS INDEX: 41.35 KG/M2 | DIASTOLIC BLOOD PRESSURE: 80 MMHG | HEART RATE: 82 BPM | WEIGHT: 295.4 LBS | HEIGHT: 71 IN

## 2018-12-05 DIAGNOSIS — E23.0 HYPOPITUITARISM (HCC): ICD-10-CM

## 2018-12-05 DIAGNOSIS — E78.00 HYPERCHOLESTEROLEMIA: ICD-10-CM

## 2018-12-05 DIAGNOSIS — E27.40 ADRENAL INSUFFICIENCY (HCC): Primary | ICD-10-CM

## 2018-12-05 DIAGNOSIS — I10 ESSENTIAL HYPERTENSION: ICD-10-CM

## 2018-12-05 DIAGNOSIS — E23.0 HYPOGONADOTROPIC HYPOGONADISM (HCC): Primary | ICD-10-CM

## 2018-12-05 DIAGNOSIS — E03.8 CENTRAL HYPOTHYROIDISM: ICD-10-CM

## 2018-12-05 DIAGNOSIS — E27.40 ADRENAL INSUFFICIENCY (HCC): ICD-10-CM

## 2018-12-05 LAB
ALBUMIN SERPL-MCNC: 4.4 G/DL (ref 3.5–5)
ALBUMIN/GLOB SERPL: 1.2 G/DL (ref 1.1–2.5)
ALP SERPL-CCNC: 88 U/L (ref 24–120)
ALT SERPL W P-5'-P-CCNC: 80 U/L (ref 0–54)
ANION GAP SERPL CALCULATED.3IONS-SCNC: 13 MMOL/L (ref 4–13)
AST SERPL-CCNC: 63 U/L (ref 7–45)
BASOPHILS # BLD AUTO: 0.08 10*3/MM3 (ref 0–0.2)
BASOPHILS NFR BLD AUTO: 1.3 % (ref 0–2)
BILIRUB SERPL-MCNC: 0.5 MG/DL (ref 0.1–1)
BUN BLD-MCNC: 16 MG/DL (ref 5–21)
BUN/CREAT SERPL: 15.8 (ref 7–25)
CALCIUM SPEC-SCNC: 9.5 MG/DL (ref 8.4–10.4)
CHLORIDE SERPL-SCNC: 105 MMOL/L (ref 98–110)
CO2 SERPL-SCNC: 26 MMOL/L (ref 24–31)
CORTIS SERPL-MCNC: 25.5 MCG/DL (ref 4.46–22.7)
CORTIS SERPL-MCNC: 6.57 MCG/DL (ref 4.46–22.7)
CREAT BLD-MCNC: 1.01 MG/DL (ref 0.5–1.4)
DEPRECATED RDW RBC AUTO: 40 FL (ref 40–54)
EOSINOPHIL # BLD AUTO: 0.19 10*3/MM3 (ref 0–0.7)
EOSINOPHIL NFR BLD AUTO: 3.1 % (ref 0–4)
ERYTHROCYTE [DISTWIDTH] IN BLOOD BY AUTOMATED COUNT: 12.3 % (ref 12–15)
ESTRADIOL SERPL HS-MCNC: 16.6 PG/ML (ref 5.4–65.9)
GFR SERPL CREATININE-BSD FRML MDRD: 81 ML/MIN/1.73
GLOBULIN UR ELPH-MCNC: 3.6 GM/DL
GLUCOSE BLD-MCNC: 113 MG/DL (ref 70–100)
HCT VFR BLD AUTO: 36.5 % (ref 40–52)
HGB BLD-MCNC: 12.8 G/DL (ref 14–18)
IMM GRANULOCYTES # BLD: 0.02 10*3/MM3 (ref 0–0.03)
IMM GRANULOCYTES NFR BLD: 0.3 % (ref 0–5)
IRON 24H UR-MRATE: 85 MCG/DL (ref 49–181)
IRON SATN MFR SERPL: 29 % (ref 20–55)
LYMPHOCYTES # BLD AUTO: 1.41 10*3/MM3 (ref 0.72–4.86)
LYMPHOCYTES NFR BLD AUTO: 22.8 % (ref 15–45)
MCH RBC QN AUTO: 31.4 PG (ref 28–32)
MCHC RBC AUTO-ENTMCNC: 35.1 G/DL (ref 33–36)
MCV RBC AUTO: 89.7 FL (ref 82–95)
MONOCYTES # BLD AUTO: 0.71 10*3/MM3 (ref 0.19–1.3)
MONOCYTES NFR BLD AUTO: 11.5 % (ref 4–12)
NEUTROPHILS # BLD AUTO: 3.77 10*3/MM3 (ref 1.87–8.4)
NEUTROPHILS NFR BLD AUTO: 61 % (ref 39–78)
NRBC BLD MANUAL-RTO: 0 /100 WBC (ref 0–0)
PLATELET # BLD AUTO: 227 10*3/MM3 (ref 130–400)
PMV BLD AUTO: 10.1 FL (ref 6–12)
POTASSIUM BLD-SCNC: 3.9 MMOL/L (ref 3.5–5.3)
PROT SERPL-MCNC: 8 G/DL (ref 6.3–8.7)
RBC # BLD AUTO: 4.07 10*6/MM3 (ref 4.8–5.9)
SODIUM BLD-SCNC: 144 MMOL/L (ref 135–145)
T4 FREE SERPL-MCNC: 0.61 NG/DL (ref 0.78–2.19)
TIBC SERPL-MCNC: 293 MCG/DL (ref 261–462)
TSH SERPL DL<=0.05 MIU/L-ACNC: 1.64 MIU/ML (ref 0.47–4.68)
VIT B12 BLD-MCNC: >1000 PG/ML (ref 239–931)
WBC NRBC COR # BLD: 6.18 10*3/MM3 (ref 4.8–10.8)

## 2018-12-05 PROCEDURE — 99214 OFFICE O/P EST MOD 30 MIN: CPT | Performed by: INTERNAL MEDICINE

## 2018-12-05 PROCEDURE — 84443 ASSAY THYROID STIM HORMONE: CPT | Performed by: INTERNAL MEDICINE

## 2018-12-05 PROCEDURE — 82607 VITAMIN B-12: CPT | Performed by: INTERNAL MEDICINE

## 2018-12-05 PROCEDURE — 84410 TESTOSTERONE BIOAVAILABLE: CPT | Performed by: INTERNAL MEDICINE

## 2018-12-05 PROCEDURE — 84305 ASSAY OF SOMATOMEDIN: CPT | Performed by: INTERNAL MEDICINE

## 2018-12-05 PROCEDURE — 82533 TOTAL CORTISOL: CPT

## 2018-12-05 PROCEDURE — 85014 HEMATOCRIT: CPT | Performed by: INTERNAL MEDICINE

## 2018-12-05 PROCEDURE — 82024 ASSAY OF ACTH: CPT | Performed by: INTERNAL MEDICINE

## 2018-12-05 PROCEDURE — 84439 ASSAY OF FREE THYROXINE: CPT | Performed by: INTERNAL MEDICINE

## 2018-12-05 PROCEDURE — 83516 IMMUNOASSAY NONANTIBODY: CPT | Performed by: INTERNAL MEDICINE

## 2018-12-05 PROCEDURE — 82784 ASSAY IGA/IGD/IGG/IGM EACH: CPT | Performed by: INTERNAL MEDICINE

## 2018-12-05 PROCEDURE — 82670 ASSAY OF TOTAL ESTRADIOL: CPT | Performed by: INTERNAL MEDICINE

## 2018-12-05 PROCEDURE — 83540 ASSAY OF IRON: CPT | Performed by: INTERNAL MEDICINE

## 2018-12-05 PROCEDURE — 85025 COMPLETE CBC W/AUTO DIFF WBC: CPT | Performed by: INTERNAL MEDICINE

## 2018-12-05 PROCEDURE — 96372 THER/PROPH/DIAG INJ SC/IM: CPT | Performed by: INTERNAL MEDICINE

## 2018-12-05 PROCEDURE — 84153 ASSAY OF PSA TOTAL: CPT | Performed by: INTERNAL MEDICINE

## 2018-12-05 PROCEDURE — 83550 IRON BINDING TEST: CPT | Performed by: INTERNAL MEDICINE

## 2018-12-05 PROCEDURE — 82747 ASSAY OF FOLIC ACID RBC: CPT | Performed by: INTERNAL MEDICINE

## 2018-12-05 PROCEDURE — 36415 COLL VENOUS BLD VENIPUNCTURE: CPT | Performed by: INTERNAL MEDICINE

## 2018-12-05 PROCEDURE — 80053 COMPREHEN METABOLIC PANEL: CPT | Performed by: INTERNAL MEDICINE

## 2018-12-05 PROCEDURE — 84403 ASSAY OF TOTAL TESTOSTERONE: CPT | Performed by: INTERNAL MEDICINE

## 2018-12-05 RX ORDER — LEVOTHYROXINE SODIUM 0.05 MG/1
50 TABLET ORAL DAILY
Qty: 30 TABLET | Refills: 11 | Status: SHIPPED | OUTPATIENT
Start: 2018-12-05 | End: 2019-06-17

## 2018-12-05 RX ORDER — COSYNTROPIN 0.25 MG/ML
0.25 INJECTION, POWDER, FOR SOLUTION INTRAMUSCULAR; INTRAVENOUS ONCE
Status: COMPLETED | OUTPATIENT
Start: 2018-12-05 | End: 2018-12-05

## 2018-12-05 RX ADMIN — COSYNTROPIN 0.25 MG: 0.25 INJECTION, POWDER, FOR SOLUTION INTRAMUSCULAR; INTRAVENOUS at 09:35

## 2018-12-05 NOTE — PROGRESS NOTES
Carlos Mena is a 43 y.o. male who presents for  evaluation of   Chief Complaint   Patient presents with   • Hypogonadism       Referring provider    No referring provider defined for this encounter.    Primary Care Provider    Judson Gonzalez MD    43-year-old male comes for follow-up.    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    I reevaluated testosterone off Clomid and total testosterone was low but bioavailable testosterone was normal ×2.    In our workup with the proved low free T4 confirmed by dialysis and he has been on levothyroxine.    Additional workup for pituitary axis included a cosyntropin stimulation test, measurement of IGF-I, prolactin and all other pituitary hormones are normal.    Additional testing revealed impaired fasting glucose    Past Medical History:   Diagnosis Date   • Essential hypertension 8/3/2018   • Hypercholesterolemia 8/3/2018   • Hypogonadism in male 8/3/2018     Family History   Problem Relation Age of Onset   • Diabetes Mother      Social History     Tobacco Use   • Smoking status: Former Smoker     Years: 4.00     Types: Cigarettes     Last attempt to quit: 2008     Years since quitting: 10.9   • Smokeless tobacco: Never Used   Substance Use Topics   • Alcohol use: Yes     Comment: social   • Drug use: No         Current Outpatient Medications:   •  atorvastatin (LIPITOR) 20 MG tablet, Take 20 mg by mouth Daily., Disp: , Rfl:   •  clomiPHENE (CLOMID) 50 MG tablet, Take 1 tablet by mouth Daily. Half a tablet daily, Disp: 15 tablet, Rfl: 5  •  CloNIDine (CATAPRES) 0.3 MG tablet, Take 0.3 mg by mouth 2 (Two) Times a Day., Disp: , Rfl:   •  esomeprazole (nexIUM) 40 MG capsule, Take 40 mg by mouth Every Morning Before Breakfast., Disp: , Rfl:   •  FLUoxetine (PROzac) 20 MG capsule, Take 40 mg by mouth Daily., Disp: ,  Rfl:   •  Fluticasone Furoate 100 MCG/ACT aerosol powder , Inhale Daily., Disp: , Rfl:   •  losartan (COZAAR) 100 MG tablet, Take 100 mg by mouth Daily., Disp: , Rfl:   •  montelukast (SINGULAIR) 10 MG tablet, Take 10 mg by mouth Every Night., Disp: , Rfl:   •  Multiple Vitamins-Minerals (MULTIVITAMIN ADULT PO), Take 1 tablet by mouth Daily., Disp: , Rfl:   •  OXcarbazepine (TRILEPTAL) 150 MG tablet, Take 150 mg by mouth 2 (Two) Times a Day., Disp: , Rfl:   •  Blood Glucose Monitoring Suppl w/Device kit, USE AS INDICATED, ANY MONITOR , ICD10 code is E11.9, Disp: 1 each, Rfl: 1  •  Glucose Blood (BLOOD GLUCOSE TEST) strip, Use 4 x daily, use any brand covered by insurance or same brand as before , ICD10 code is E11.9, Disp: 120 each, Rfl: 11  •  Lancet Devices (LANCING DEVICE) misc, USE AS INDICATED TO CORRELATE WITH STRIPS AND METER, Disp: 1 each, Rfl: 1  •  Lancets 30G misc, USE 4 X DAILY, Disp: 120 each, Rfl: 11  •  levothyroxine (SYNTHROID) 50 MCG tablet, Take 1 tablet by mouth Daily., Disp: 30 tablet, Rfl: 11    Review of Systems    Review of Systems   Constitutional: Positive for fatigue. Negative for activity change, appetite change, chills, diaphoresis, fever and unexpected weight change.   HENT: Negative for congestion, dental problem, drooling, ear discharge, ear pain, facial swelling, mouth sores, postnasal drip, rhinorrhea, sinus pressure, sore throat, tinnitus, trouble swallowing and voice change.    Eyes: Negative for photophobia, pain, discharge, redness, itching and visual disturbance.   Respiratory: Negative for apnea, cough, choking, chest tightness, shortness of breath, wheezing and stridor.    Cardiovascular: Negative for chest pain, palpitations and leg swelling.   Gastrointestinal: Negative for abdominal distention, abdominal pain, constipation, diarrhea, nausea and vomiting.   Endocrine: Negative for cold intolerance, heat intolerance, polydipsia, polyphagia and polyuria.   Genitourinary:  "Negative for decreased urine volume, difficulty urinating, dysuria, flank pain, frequency, hematuria and urgency.   Musculoskeletal: Negative for arthralgias, back pain, gait problem, joint swelling, myalgias, neck pain and neck stiffness.   Skin: Negative for color change, pallor, rash and wound.   Allergic/Immunologic: Negative for immunocompromised state.   Neurological: Positive for weakness. Negative for dizziness, tremors, seizures, syncope, facial asymmetry, speech difficulty, light-headedness, numbness and headaches.   Hematological: Negative for adenopathy.   Psychiatric/Behavioral: Negative for agitation, behavioral problems, confusion, decreased concentration, dysphoric mood, hallucinations, self-injury, sleep disturbance and suicidal ideas. The patient is not nervous/anxious and is not hyperactive.         Objective:   /80   Pulse 82   Ht 180.3 cm (71\")   Wt 134 kg (295 lb 6.4 oz)   SpO2 97%   BMI 41.20 kg/m²     Physical Exam   Constitutional: He is oriented to person, place, and time. He appears well-developed and well-nourished. He is cooperative.   HENT:   Head: Normocephalic and atraumatic.   Right Ear: External ear normal.   Left Ear: External ear normal.   Nose: Nose normal.   Mouth/Throat: Oropharynx is clear and moist. No oropharyngeal exudate.   Eyes: Conjunctivae and EOM are normal. Pupils are equal, round, and reactive to light. No scleral icterus. Right eye exhibits normal extraocular motion. Left eye exhibits normal extraocular motion.   Neck: Neck supple. No JVD present. No muscular tenderness present. No tracheal deviation, no edema and no erythema present. No thyromegaly present.   Cardiovascular: Normal rate, regular rhythm, normal heart sounds and intact distal pulses. Exam reveals no gallop and no friction rub.   No murmur heard.  Pulmonary/Chest: Effort normal and breath sounds normal. No stridor. No respiratory distress. He has no decreased breath sounds. He has no " wheezes. He has no rhonchi. He has no rales. He exhibits no tenderness.   Abdominal: Soft. Bowel sounds are normal. He exhibits no distension and no mass. There is no hepatomegaly. There is no tenderness. There is no rebound and no guarding. No hernia.   Musculoskeletal: Normal range of motion. He exhibits no edema, tenderness or deformity.   Lymphadenopathy:     He has no cervical adenopathy.   Neurological: He is alert and oriented to person, place, and time. He has normal reflexes. No cranial nerve deficit. He exhibits normal muscle tone. Coordination normal.   Skin: Skin is warm. No rash noted. No erythema. No pallor.   Psychiatric: He has a normal mood and affect. His behavior is normal. Judgment and thought content normal.   Nursing note and vitals reviewed.      Lab Review          Assessment/Plan       ICD-10-CM ICD-9-CM   1. Hypogonadotropic hypogonadism (CMS/[facility]) E23.0 253.4   2. Adrenal insufficiency (CMS/[facility]) E27.40 255.41   3. Central hypothyroidism E03.8 244.9   4. Essential hypertension I10 401.9   5. Hypercholesterolemia E78.00 272.0   6. Hypopituitarism (CMS/[facility]) E23.0 253.2           History of Hypogonadism and was on clomiphene    We retested and bioavailable was normal x 2    Last in 2018      ===================    Central Hypothyroidism confirmed by Free T4 by dialysis with neg MRI     Lab Results   Component Value Date    TSH 1.640 12/05/2018    TSH 1.130 08/03/2018    TSH 2.520 12/06/2017       Lab Results   Component Value Date    FREET4 0.61 (L) 12/05/2018    FREET4 0.64 (L) 08/03/2018    FREET4 0.65 (L) 12/06/2017     LT4 at 25 , increased to 50     ----    2018     Nl IGF1  Nl prolactin    Nl  cosyntropin stim test     2018 - normal pituitary MRI    -----    Anemia    Do workup     Repeat nl with nl iron , b12, folic acid    ----    IFG    Defined threshold of what is normal and what requires tx         Orders Placed This Encounter   Procedures   • Iron Profile   • Vitamin B12   • Folate  RBC   • Celiac Panel Reflex To Titer   • ACTH   • Testosterone, Bioavailable (M)   • Free T4 By Dialysis / Mass Spec     Do with all other labs         A copy of my note was sent to Judson Gonzalez MD    Please see my above opinion and suggestions.       MDM

## 2018-12-06 LAB
ACTH PLAS-MCNC: 14.8 PG/ML (ref 7.2–63.3)
FOLATE BLD-MCNC: 471.7 NG/ML
FOLATE RBC-MCNC: 1225 NG/ML
GLIADIN PEPTIDE IGA SER-ACNC: 6 UNITS (ref 0–19)
HCT VFR BLD AUTO: 38.5 % (ref 37.5–51)
IGA SERPL-MCNC: 296 MG/DL (ref 90–386)
IGF-I SERPL-MCNC: 136 NG/ML (ref 75–216)
PSA SERPL-MCNC: 0.6 NG/ML (ref 0–4)
REFLEX: NORMAL
TESTOST SERPL-MCNC: 193 NG/DL (ref 264–916)
TTG IGA SER-ACNC: <2 U/ML (ref 0–3)

## 2018-12-06 RX ORDER — GLUCOSAMINE HCL/CHONDROITIN SU 500-400 MG
CAPSULE ORAL
Qty: 120 EACH | Refills: 11 | Status: SHIPPED | OUTPATIENT
Start: 2018-12-06 | End: 2021-08-27

## 2018-12-06 RX ORDER — LANCING DEVICE
EACH MISCELLANEOUS
Qty: 1 EACH | Refills: 1 | Status: SHIPPED | OUTPATIENT
Start: 2018-12-06 | End: 2021-08-27

## 2018-12-11 LAB — T4 FREE SERPL DIALY-MCNC: 0.57 NG/DL

## 2018-12-14 DIAGNOSIS — R73.01 IMPAIRED FASTING GLUCOSE: ICD-10-CM

## 2018-12-14 DIAGNOSIS — E78.00 HYPERCHOLESTEROLEMIA: ICD-10-CM

## 2018-12-14 DIAGNOSIS — E03.8 CENTRAL HYPOTHYROIDISM: Primary | ICD-10-CM

## 2018-12-14 LAB
BIOAVAILABLE TESTOSTERONE, %: 51.4 %
BIOAVAILABLE TESTOSTERONE, S: 172 NG/DL
TESTOST SERPL-MCNC: 334 NG/DL

## 2019-01-24 ENCOUNTER — HOSPITAL ENCOUNTER (OUTPATIENT)
Dept: GENERAL RADIOLOGY | Facility: HOSPITAL | Age: 44
Discharge: HOME | End: 2019-01-24
Attending: INTERNAL MEDICINE

## 2019-01-24 ENCOUNTER — TRANSCRIBE ORDERS (OUTPATIENT)
Dept: ADMINISTRATIVE | Facility: HOSPITAL | Age: 44
End: 2019-01-24

## 2019-01-24 ENCOUNTER — HOSPITAL ENCOUNTER (OUTPATIENT)
Dept: GENERAL RADIOLOGY | Facility: HOSPITAL | Age: 44
Discharge: HOME | End: 2019-01-24
Attending: INTERNAL MEDICINE | Admitting: INTERNAL MEDICINE

## 2019-01-24 DIAGNOSIS — M25.552 LEFT HIP PAIN: Primary | ICD-10-CM

## 2019-01-24 DIAGNOSIS — M25.552 LEFT HIP PAIN: ICD-10-CM

## 2019-01-24 PROCEDURE — 72110 X-RAY EXAM L-2 SPINE 4/>VWS: CPT

## 2019-01-24 PROCEDURE — 73502 X-RAY EXAM HIP UNI 2-3 VIEWS: CPT

## 2019-01-28 ENCOUNTER — TRANSCRIBE ORDERS (OUTPATIENT)
Dept: PHYSICAL THERAPY | Facility: HOSPITAL | Age: 44
End: 2019-01-28

## 2019-01-28 DIAGNOSIS — M25.552 LEFT HIP PAIN: Primary | ICD-10-CM

## 2019-02-11 ENCOUNTER — APPOINTMENT (OUTPATIENT)
Dept: PHYSICAL THERAPY | Facility: HOSPITAL | Age: 44
End: 2019-02-11

## 2019-02-12 ENCOUNTER — HOSPITAL ENCOUNTER (OUTPATIENT)
Dept: PHYSICAL THERAPY | Facility: HOSPITAL | Age: 44
Setting detail: THERAPIES SERIES
Discharge: HOME | End: 2019-02-12

## 2019-02-12 DIAGNOSIS — M25.552 LEFT HIP PAIN: Primary | ICD-10-CM

## 2019-02-12 PROCEDURE — 97161 PT EVAL LOW COMPLEX 20 MIN: CPT | Performed by: PHYSICAL THERAPIST

## 2019-02-12 NOTE — THERAPY EVALUATION
Outpatient Physical Therapy Ortho Initial Evaluation   [place]     Patient Name: Carlos Mena  : 1975  MRN: [id number]  Today's Date: 2019      Visit Date: 2019    Patient Active Problem List   Diagnosis   • Hypogonadism in male   • Essential hypertension   • Hypercholesterolemia   • Hypopituitarism (CMS/HCC)   • Central hypothyroidism   • Moderate persistent asthma without complication   • Gastroesophageal reflux disease without esophagitis   • Thyroid disease        Past Medical History:   Diagnosis Date   • Essential hypertension 8/3/2018   • Hypercholesterolemia 8/3/2018   • Hypogonadism in male 8/3/2018        History reviewed. No pertinent surgical history.    Visit Dx:     ICD-10-CM ICD-9-CM   1. Left hip pain M25.552 719.45       Patient History     Row Name 19 1400             History    Chief Complaint  Pain  -TB      Type of Pain  Back pain;Hip pain  -TB      Date Current Problem(s) Began  19  -TB      Brief Description of Current Complaint  The first of the year, he had an exacerbation of left hip (glute) pain. He doesn't know how it might have started. He notices that walking tends to irritate his pain. He doesn't have as much pain sitting but it worsens when he goes to stand. The pain might go to the top of the thigh. The pain will also radiate up. He denies any change in b/b or saddle anesthesia.   -TB      Previous treatment for THIS PROBLEM  Medication OTC  -TB      Patient/Caregiver Goals  Relieve pain;Return to prior level of function;Know what to do to help the symptoms  -TB      Patient's Rating of General Health  Very good  -TB      Hand Dominance  right-handed  -TB      Occupation/sports/leisure activities    -TB      How has patient tried to help current problem?  ibuprofen  -TB      What clinical tests have you had for this problem?  X-ray  -TB      Results of Clinical Tests  negative  -TB         Pain     Pain Location  Hip  -TB      Pain at  Present  2  -TB      Pain at Best  0  -TB      Pain at Worst  8;9  -TB      Pain Frequency  Intermittent  -TB      Pain Description  Shooting;Stabbing  -TB      What Performance Factors Make the Current Problem(s) WORSE?  walking; stair climbing  -TB      What Performance Factors Make the Current Problem(s) BETTER?  lying down, sitting  -TB      Pain Comments  pain mostly in left mid to lower glute; pain will shoot up into his back and down to proximal thigh  -TB         Fall Risk Assessment    Any falls in the past year:  No  -TB         Services    Prior Rehab/Home Health Experiences  Yes  -TB      When was the prior experience with Rehab/Home Health  years ago for LBP  -TB      Where was the prior experience with Rehab/Home Health  [facility]  -TB         Daily Activities    Primary Language  English  -TB      How does patient learn best?  Listening  -TB      Teaching needs identified  Home Exercise Program;Management of Condition  -TB      Patient is concerned about/has problems with  Performing home management (household chores, shopping, care of dependents);Performing job responsibilities/community activities (work, school,  -TB      Does patient have problems with the following?  Depression  -TB      Barriers to learning  None  -TB      Pt Participated in POC and Goals  Yes  -TB         Safety    Are you being hurt, hit, or frightened by anyone at home or in your life?  No  -TB      Are you being neglected by a caregiver  No  -TB        User Key  (r) = Recorded By, (t) = Taken By, (c) = Cosigned By    Initials Name Provider Type    TB Khalif Live PT Physical Therapist          PT Ortho     Row Name 02/12/19 1600       Posture/Observations    Posture/Observations Comments  stands in a swayback posture  -TB       Quarter Clearing    Quarter Clearing  Lower Quarter Clearing  -TB       DTR- Lower Quarter Clearing    Patellar tendon (L2-4)  Bilateral:;3- Slightly hyperactive response  -TB    Achilles tendon (S1-2)   Bilateral:;3- Slightly hyperactive response  -TB       Sensory Screen for Light Touch- Lower Quarter Clearing    L1 (inguinal area)  Bilateral:;Intact  -TB    L2 (anterior mid thigh)  Bilateral:;Intact  -TB    L3 (distal anterior thigh)  Bilateral:;Intact  -TB    L4 (medial lower leg/foot)  Bilateral:;Intact  -TB    L5 (lateral lower leg/great toe)  Bilateral:;Intact  -TB    S1 (bottom of foot)  Bilateral:;Intact  -TB       Myotomal Screen- Lower Quarter Clearing    Hip flexion (L2)  Bilateral:;WNL  -TB    Knee extension (L3)  Bilateral:;WNL  -TB    Ankle DF (L4)  Bilateral:;WNL  -TB    Great toe extension (L5)  Bilateral:;WNL  -TB    Ankle PF (S1)  Bilateral:;WNL  -TB    Knee flexion (S2)  Bilateral:;WNL  -TB       Lumbar ROM Screen- Lower Quarter Clearing    Lumbar Flexion  Impaired 75% with lumbar staying in lordosis  -TB    Lumbar Extension  Impaired 75%  -TB       Special Tests/Palpation    Special Tests/Palpation  Lumbar/SI  -TB       Lumbar/SI Special Tests    Standing Flexion Test (SI Dysfunction)  Bilateral:;Negative  -TB    Stork Test (SI Dysfunction)  Bilateral:;Negative  -TB    Trendelenburg Test (Gluteus Medius Weakness)  Bilateral:;Positive  -TB    María Pancho Test (HNP)  Negative  -TB    SLR (Neural Tension)  Bilateral:;Negative  -TB    SI Compression Test (SI Dysfunction)  Bilateral:;Negative  -TB    SI Distraction Test (SI Dysfunction)  Bilateral:;Negative  -TB    Thigh Thrust/Posterior Shear (SI Dysfunction)  Left:;Positive  -TB    LAURENT (hip vs. SI Dysfunction)  Left:;Positive ant and post pain  -TB    S[place]ral [place] Test (SI Dysfunction)  Left:;Positive  -TB       Lumbosacral Palpation    Lumbosacral Palpation?  Yes  -TB    SI  Left:;Tender  -TB    Lumbosacral Segment  Left:;Tender  -TB    Piriformis  Left:;Tender;Guarded/taut OI also tender/guarded  -TB    Pelvic Floor  Left:;Tender;Guarded/taut  -TB    Iliopsoas  Left:;Bilateral:;Tender  -TB       General ROM    RT Lower Ext  Rt Hip Extension;Rt  Hip Flexion;Rt Hip External Rotation;Rt Hip Internal Rotation  -TB    LT Lower Ext  Lt Hip Extension;Lt Hip Flexion;Lt Hip External Rotation;Lt Hip Internal Rotation  -TB       Right Lower Ext    Rt Hip Extension PROM  10  -TB    Rt Hip Flexion PROM  120  -TB    Rt Hip External Rotation PROM  30  -TB    Rt Hip Internal Rotation PROM  5  -TB       Left Lower Ext    Lt Hip Extension PROM  10  -TB    Lt Hip Flexion PROM  120 iwth left groin pain  -TB    Lt Hip External Rotation PROM  30 deg with left piri pain  -TB    Lt Hip Internal Rotation PROM  5 deg with left groin pain  -TB       MMT (Manual Muscle Testing)    General MMT Comments  tina hip abd/ext 5/5; pain left hip ext in left lumbar  -TB       Flexibility    Flexibility Tested?  Lower Extremity  -TB       Lower Extremity Flexibility    Hamstrings  Bilateral:;Moderately limited 50 deg tina  -TB    Hip Adductors  Bilateral:;Moderately limited  -TB    Hip External Rotators  Bilateral:;Moderately limited  -TB       Pathomechanics    Spine Pathomechanics  Bends knees with attempted lumbar extension;Hinges into extension at one segment in lumbar;Limited lumbar flattening with forward bend;Excessive thoracic kyphosis with forward bend  -TB      User Key  (r) = Recorded By, (t) = Taken By, (c) = Cosigned By    Initials Name Provider Type    TB Khalif Live, PT Physical Therapist                      Therapy Education  Education Details: hooklying piri stretch; LAURENT stretch; seated ham stretch; walk with shorter steps  Given: HEP, Posture/body mechanics, Symptoms/condition management  Program: New  How Provided: Verbal, Demonstration  Provided to: Patient  Level of Understanding: Teach back education performed, Verbalized, Demonstrated     PT OP Goals     Row Name 02/12/19 1600          Long Term Goals    LTG Date to Achieve  03/26/19  -TB     LTG 1  Improve tina hamstring SLR to 70 deg  -TB     LTG 1 Progress  New  -TB     LTG 2  Reports no pain at work except  occasional minor twinges no more than 1-2/10  -TB     LTG 2 Progress  New  -TB     LTG 3  Improve tina hip IR to 25 deg  -TB     LTG 3 Progress  New  -TB     LTG 4  Improve tina piriformis flexibility to WNL  -TB     LTG 4 Progress  New  -TB     LTG 5  Independent with HEP for flexibility  -TB     LTG 5 Progress  New  -TB        Time Calculation    PT Goal Re-Cert Due Date  03/14/19  -TB       User Key  (r) = Recorded By, (t) = Taken By, (c) = Cosigned By    Initials Name Provider Type    TB Kahlif Live, PT Physical Therapist          PT Assessment/Plan     Row Name 02/12/19 1600          PT Assessment    Functional Limitations  Limitation in home management;Limitations in community activities;Performance in work activities;Limitations in functional capacity and performance;Performance in leisure activities  -TB     Impairments  Range of motion;Pain;Joint mobility;Posture;Impaired flexibility  -TB     Assessment Comments  His primary pain may be coming from his left SI and lower lumbar but he also has secondary muscle guarding through his left hip/pelvis including his piriformis, OI, IP and hip adductors. He is very tight in his tina hips and his thoracic kyphosis which would tend to place more stress across his left SI and lower lumbar. Flexibility will be a big part of what we focus on early as this will help to distribute the stress away from his painful areas. He shows hypertonic DTR's in all 4 limbs and reports he's been told he has spinal stenosis. His dad had a decompressive surgery for cord compression. At this time, Carlos, isn't showing any signs of cord compression other than possibly his elevated DTR's.   -TB     Please refer to paper survey for additional self-reported information  Yes  -TB     Rehab Potential  Good  -TB     Patient/caregiver participated in establishment of treatment plan and goals  Yes  -TB     Patient would benefit from skilled therapy intervention  Yes  -TB        PT Plan    PT  Frequency  2x/week  -TB     Predicted Duration of Therapy Intervention (Therapy Eval)  6 weeks  -TB     Planned CPT's?  PT EVAL LOW COMPLEXITY: 40174;PT THER PROC EA 15 MIN: 13608;PT THER ACT EA 15 MIN: 75581;PT MANUAL THERAPY EA 15 MIN: 46815;PT ELECTRICAL STIM UNATTEND: ;PT ELECTRICAL STIM ATTD EA 15 MIN: 22061;PT ULTRASOUND EA 15 MIN: 85164  -TB     PT Plan Comments  We will focus early on manual therapy for relaxation of the muscle guarding through his left hip/pelvis. We will also work on hip mobilizations and stretch. We will also improve mobility through his thoracic spine to allow him to stand more erect without the stress to his lumbar. We will progress to core stability and education on body mechanics to minimize stress to his lower lumbar.   -TB       User Key  (r) = Recorded By, (t) = Taken By, (c) = Cosigned By    Initials Name Provider Type    Khalif Bhatti, PT Physical Therapist          Modalities     Row Name 02/12/19 1700             ELECTRICAL STIMULATION    Attended/Unattended  Attended  -TB      Stimulation Type  Pre-Mod combo cold laser  -TB      Location/Electrode Placement/Other  left groin  -TB        User Key  (r) = Recorded By, (t) = Taken By, (c) = Cosigned By    Initials Name Provider Type    Khalif Bhatti, PT Physical Therapist             Manual Rx (last 36 hours)      Manual Treatments     Row Name 02/12/19 1700             Manual Rx 1    Manual Rx 1 Location  deep pressure left IP, hip adductors and OI  -TB      Manual Rx 1 Type  f/b hip stretching in all planes  -TB      Manual Rx 1 Duration  20  -TB         Manual Rx 2    Manual Rx 2 Location  left hip lateral and caudal distraction  -TB      Manual Rx 2 Type  used gait belt  -TB      Manual Rx 2 Duration  8  -TB        User Key  (r) = Recorded By, (t) = Taken By, (c) = Cosigned By    Initials Name Provider Type    Khalif Bhatti, PT Physical Therapist                                Time Calculation:      Therapy Suggested Charges     Code   Minutes Charges    None             Start Time: 1445  Stop Time: 1630  Time Calculation (min): 105 min     Therapy Charges for Today     Code Description Service Date Service Provider Modifiers Qty    [id number] HC PT EVAL LOW COMPLEXITY 4 2/12/2019 Khalif Live, PT GP 1                    Khalif Live, PT  2/12/2019

## 2019-02-13 ENCOUNTER — APPOINTMENT (OUTPATIENT)
Dept: PHYSICAL THERAPY | Facility: HOSPITAL | Age: 44
End: 2019-02-13

## 2019-02-14 ENCOUNTER — HOSPITAL ENCOUNTER (OUTPATIENT)
Dept: PHYSICAL THERAPY | Facility: HOSPITAL | Age: 44
Setting detail: THERAPIES SERIES
Discharge: HOME | End: 2019-02-14

## 2019-02-14 DIAGNOSIS — M25.552 LEFT HIP PAIN: Primary | ICD-10-CM

## 2019-02-14 PROCEDURE — 97140 MANUAL THERAPY 1/> REGIONS: CPT | Performed by: PHYSICAL THERAPIST

## 2019-02-15 ENCOUNTER — APPOINTMENT (OUTPATIENT)
Dept: PHYSICAL THERAPY | Facility: HOSPITAL | Age: 44
End: 2019-02-15

## 2019-02-15 NOTE — THERAPY TREATMENT NOTE
Outpatient Physical Therapy Ortho Treatment Note   [place]     Patient Name: Carlos Mena  : 1975  MRN: [id number]  Today's Date: 2019      Visit Date: 2019    Visit Dx:    ICD-10-CM ICD-9-CM   1. Left hip pain M25.552 719.45       Patient Active Problem List   Diagnosis   • Hypogonadism in male   • Essential hypertension   • Hypercholesterolemia   • Hypopituitarism (CMS/HCC)   • Central hypothyroidism   • Moderate persistent asthma without complication   • Gastroesophageal reflux disease without esophagitis   • Thyroid disease        Past Medical History:   Diagnosis Date   • Essential hypertension 8/3/2018   • Hypercholesterolemia 8/3/2018   • Hypogonadism in male 8/3/2018        No past surgical history on file.    PT Ortho     Row Name 19 1600       Posture/Observations    Posture/Observations Comments  stands in a swayback posture  -TB       Quarter Clearing    Quarter Clearing  Lower Quarter Clearing  -TB       DTR- Lower Quarter Clearing    Patellar tendon (L2-4)  Bilateral:;3- Slightly hyperactive response  -TB    Achilles tendon (S1-2)  Bilateral:;3- Slightly hyperactive response  -TB       Sensory Screen for Light Touch- Lower Quarter Clearing    L1 (inguinal area)  Bilateral:;Intact  -TB    L2 (anterior mid thigh)  Bilateral:;Intact  -TB    L3 (distal anterior thigh)  Bilateral:;Intact  -TB    L4 (medial lower leg/foot)  Bilateral:;Intact  -TB    L5 (lateral lower leg/great toe)  Bilateral:;Intact  -TB    S1 (bottom of foot)  Bilateral:;Intact  -TB       Myotomal Screen- Lower Quarter Clearing    Hip flexion (L2)  Bilateral:;WNL  -TB    Knee extension (L3)  Bilateral:;WNL  -TB    Ankle DF (L4)  Bilateral:;WNL  -TB    Great toe extension (L5)  Bilateral:;WNL  -TB    Ankle PF (S1)  Bilateral:;WNL  -TB    Knee flexion (S2)  Bilateral:;WNL  -TB       Lumbar ROM Screen- Lower Quarter Clearing    Lumbar Flexion  Impaired 75% with lumbar staying in lordosis  -TB    Lumbar Extension   Impaired 75%  -TB       Special Tests/Palpation    Special Tests/Palpation  Lumbar/SI  -TB       Lumbar/SI Special Tests    Standing Flexion Test (SI Dysfunction)  Bilateral:;Negative  -TB    Stork Test (SI Dysfunction)  Bilateral:;Negative  -TB    Trendelenburg Test (Gluteus Medius Weakness)  Bilateral:;Positive  -TB    María Pancho Test (HNP)  Negative  -TB    SLR (Neural Tension)  Bilateral:;Negative  -TB    SI Compression Test (SI Dysfunction)  Bilateral:;Negative  -TB    SI Distraction Test (SI Dysfunction)  Bilateral:;Negative  -TB    Thigh Thrust/Posterior Shear (SI Dysfunction)  Left:;Positive  -TB    LAURENT (hip vs. SI Dysfunction)  Left:;Positive ant and post pain  -TB    Sacral Spring Test (SI Dysfunction)  Left:;Positive  -TB       Lumbosacral Palpation    Lumbosacral Palpation?  Yes  -TB    SI  Left:;Tender  -TB    Lumbosacral Segment  Left:;Tender  -TB    Piriformis  Left:;Tender;Guarded/taut OI also tender/guarded  -TB    Pelvic Floor  Left:;Tender;Guarded/taut  -TB    Iliopsoas  Left:;Bilateral:;Tender  -TB       General ROM    RT Lower Ext  Rt Hip Extension;Rt Hip Flexion;Rt Hip External Rotation;Rt Hip Internal Rotation  -TB    LT Lower Ext  Lt Hip Extension;Lt Hip Flexion;Lt Hip External Rotation;Lt Hip Internal Rotation  -TB       Right Lower Ext    Rt Hip Extension PROM  10  -TB    Rt Hip Flexion PROM  120  -TB    Rt Hip External Rotation PROM  30  -TB    Rt Hip Internal Rotation PROM  5  -TB       Left Lower Ext    Lt Hip Extension PROM  10  -TB    Lt Hip Flexion PROM  120 iwth left groin pain  -TB    Lt Hip External Rotation PROM  30 deg with left piri pain  -TB    Lt Hip Internal Rotation PROM  5 deg with left groin pain  -TB       MMT (Manual Muscle Testing)    General MMT Comments  tina hip abd/ext 5/5; pain left hip ext in left lumbar  -TB       Flexibility    Flexibility Tested?  Lower Extremity  -TB       Lower Extremity Flexibility    Hamstrings  Bilateral:;Moderately limited 50 deg tina   -TB    Hip Adductors  Bilateral:;Moderately limited  -TB    Hip External Rotators  Bilateral:;Moderately limited  -TB       Pathomechanics    Spine Pathomechanics  Bends knees with attempted lumbar extension;Hinges into extension at one segment in lumbar;Limited lumbar flattening with forward bend;Excessive thoracic kyphosis with forward bend  -TB      User Key  (r) = Recorded By, (t) = Taken By, (c) = Cosigned By    Initials Name Provider Type    Khalif Bhatti, PT Physical Therapist                      PT Assessment/Plan     Row Name 02/14/19 1800          PT Assessment    Assessment Comments  He did well with the SI belt. His hips are still very tight so we are focusing on hip capsule mobility  -TB        PT Plan    PT Plan Comments  Cont emphasis on hip capasule mobility  -TB       User Key  (r) = Recorded By, (t) = Taken By, (c) = Cosigned By    Initials Name Provider Type    Khalif Bhatti, PT Physical Therapist              Exercises     Row Name 02/14/19 1700 02/14/19 1635          Subjective Comments    Subjective Comments  --  He says he forgot to wear the SI belt. He wore it today and he felt much better.   -TB        Subjective Pain    Pre-Treatment Pain Level  --  1  -TB        Total Minutes    66166 - PT Manual Therapy Minutes  50  -TB  --       User Key  (r) = Recorded By, (t) = Taken By, (c) = Cosigned By    Initials Name Provider Type    Khalif Bhatti, PT Physical Therapist                        Manual Rx (last 36 hours)      Manual Treatments     Row Name 02/14/19 1700             Total Minutes    62567 - PT Manual Therapy Minutes  50  -TB         Manual Rx 1    Manual Rx 1 Location  prone thoracic  -TB      Manual Rx 1 Type  IASTM with foam roll and manual OP  -TB      Manual Rx 1 Grade  several cavitations  -TB      Manual Rx 1 Duration  10  -TB         Manual Rx 2    Manual Rx 2 Location  prone sacral LENA tina PA  -TB      Manual Rx 2 Grade  3 repetitive  -TB      Manual Rx 2  Duration  5  -TB         Manual Rx 3    Manual Rx 3 Location  prone deep pressure left glute  -TB      Manual Rx 3 Type  upper glute, piri and other deep hip ext rotators  -TB      Manual Rx 3 Grade  also supine left IP and hip adductor  -TB      Manual Rx 3 Duration  15  -TB         Manual Rx 4    Manual Rx 4 Location  supine left lateral and caudal distraction to hip  -TB      Manual Rx 4 Type  used gait belt  -TB      Manual Rx 4 Grade  added hip ER and IR to stretch  -TB      Manual Rx 4 Duration  10  -TB         Manual Rx 5    Manual Rx 5 Location  supine left LAD  -TB      Manual Rx 5 Type  belt used to help  -TB      Manual Rx 5 Duration  5  -TB         Manual Rx 6    Manual Rx 6 Location  lumbar roll tina  -TB      Manual Rx 6 Type  1 cavitation  -TB      Manual Rx 6 Duration  3  -TB        User Key  (r) = Recorded By, (t) = Taken By, (c) = Cosigned By    Initials Name Provider Type    TB Khalif Live, PT Physical Therapist          PT OP Goals     Row Name 02/14/19 1700          Long Term Goals    LTG Date to Achieve  03/26/19  -TB     LTG 1  Improve tina hamstring SLR to 70 deg  -TB     LTG 1 Progress  Ongoing  -TB     LTG 2  Reports no pain at work except occasional minor twinges no more than 1-2/10  -TB     LTG 2 Progress  Ongoing  -TB     LTG 2 Progress Comments  pain better wtih SI belt at 1/10  -TB     LTG 3  Improve tina hip IR to 25 deg  -TB     LTG 3 Progress  New  -TB     LTG 4  Improve tina piriformis flexibility to WNL  -TB     LTG 4 Progress  New  -TB     LTG 5  Independent with HEP for flexibility  -TB     LTG 5 Progress  New  -TB        Time Calculation    PT Goal Re-Cert Due Date  03/14/19  -TB       User Key  (r) = Recorded By, (t) = Taken By, (c) = Cosigned By    Initials Name Provider Type    TB Khalif Live, PT Physical Therapist          Therapy Education  Given: HEP, Posture/body mechanics, Symptoms/condition management  Program: New  How Provided: Verbal, Demonstration  Provided  to: Patient  Level of Understanding: Teach back education performed, Verbalized, Demonstrated              Time Calculation:   Start Time: 1635  Stop Time: 1725  Time Calculation (min): 50 min  Therapy Suggested Charges     Code   Minutes Charges    37165 (CPT®) Hc Pt Neuromusc Re Education Ea 15 Min      53176 (CPT®) Hc Pt Ther Proc Ea 15 Min      34668 (CPT®) Hc Gait Training Ea 15 Min      19070 (CPT®) Hc Pt Therapeutic Act Ea 15 Min      40591 (CPT®) Hc Pt Manual Therapy Ea 15 Min 50 3    99195 (CPT®) Hc Pt Ther Massage- Per 15 Min      09791 (CPT®) Hc Pt Iontophoresis Ea 15 Min      29834 (CPT®) Hc Pt Elec Stim Ea-Per 15 Min      24939 (CPT®) Hc Pt Ultrasound Ea 15 Min      83870 (CPT®) Hc Pt Self Care/Mgmt/Train Ea 15 Min      04111 (CPT®) Hc Pt Prosthetic (S) Train Initial Encounter, Each 15 Min      93689 (CPT®) Hc Orthotic(S) Mgmt/Train Initial Encounter, Each 15min      62993 (CPT®) Hc Pt Aquatic Therapy Ea 15 Min      81841 (CPT®) Hc Pt Orthotic(S)/Prosthetic(S) Encounter, Each 15 Min       (CPT®) Hc Pt Electrical Stim Unattended      Total  50 3        Therapy Charges for Today     Code Description Service Date Service Provider Modifiers Qty    21577094243 HC PT MANUAL THERAPY EA 15 MIN 2/14/2019 Khalif Live, PT GP 3                    Khalif Live, PT  2/14/2019

## 2019-02-19 ENCOUNTER — HOSPITAL ENCOUNTER (OUTPATIENT)
Dept: PHYSICAL THERAPY | Facility: HOSPITAL | Age: 44
Setting detail: THERAPIES SERIES
Discharge: HOME | End: 2019-02-19

## 2019-02-19 DIAGNOSIS — M25.552 LEFT HIP PAIN: Primary | ICD-10-CM

## 2019-02-19 PROCEDURE — 97140 MANUAL THERAPY 1/> REGIONS: CPT | Performed by: PHYSICAL THERAPIST

## 2019-02-19 NOTE — THERAPY TREATMENT NOTE
Outpatient Physical Therapy Ortho Treatment Note  [facility]     Patient Name: Carlos Mena  : 1975  MRN: [id number]  Today's Date: 2019      Visit Date: 2019    Visit Dx:    ICD-10-CM ICD-9-CM   1. Left hip pain M25.552 719.45       Patient Active Problem List   Diagnosis   • Hypogonadism in male   • Essential hypertension   • Hypercholesterolemia   • Hypopituitarism (CMS/HCC)   • Central hypothyroidism   • Moderate persistent asthma without complication   • Gastroesophageal reflux disease without esophagitis   • Thyroid disease        Past Medical History:   Diagnosis Date   • Essential hypertension 8/3/2018   • Hypercholesterolemia 8/3/2018   • Hypogonadism in male 8/3/2018        No past surgical history on file.                    PT Assessment/Plan     Row Name 19 0800          PT Assessment    Assessment Comments  The SI belt appears to be working well. His hip is still tight but we are working on this.  -TB        PT Plan    PT Plan Comments  Progress to more yoga.  -TB       User Key  (r) = Recorded By, (t) = Taken By, (c) = Cosigned By    Initials Name Provider Type    Khalif Bhatti, PT Physical Therapist              Exercises     Row Name 19 0800             Subjective Comments    Subjective Comments  He worked Saturday and he swung his left leg out and he started hurting again. Other than that, it's been fine.   -TB         Subjective Pain    Pre-Treatment Pain Level  1  -TB         Total Minutes    36300 - PT Manual Therapy Minutes  45  -TB        User Key  (r) = Recorded By, (t) = Taken By, (c) = Cosigned By    Initials Name Provider Type    Khalif Bhatti, PT Physical Therapist                        Manual Rx (last 36 hours)      Manual Treatments     Row Name 19 0802 19 0800          Total Minutes    19211 - PT Manual Therapy Minutes  --  45  -TB        Manual Rx 1    Manual Rx 1 Location  prone thoracic  -TB  --     Manual Rx 1 Type  IASTM  with foam roll and manual OP  -TB  --     Manual Rx 1 Grade  several cavitations  -TB  --     Manual Rx 1 Duration  10  -TB  --        Manual Rx 2    Manual Rx 2 Location  prone sacral LENA tina PA  -TB  --     Manual Rx 2 Grade  3 repetitive  -TB  --     Manual Rx 2 Duration  5  -TB  --        Manual Rx 3    Manual Rx 3 Location  prone deep pressure left glute  -TB  --     Manual Rx 3 Type  upper glute, piri and other deep hip ext rotators  -TB  --     Manual Rx 3 Grade  --  -TB  --     Manual Rx 3 Duration  10  -TB  --        Manual Rx 4    Manual Rx 4 Location  supine left lateral and caudal distraction to hip  -TB  --     Manual Rx 4 Type  used gait belt  -TB  --     Manual Rx 4 Grade  added hip ER and IR to stretch  -TB  --     Manual Rx 4 Duration  10  -TB  --        Manual Rx 5    Manual Rx 5 Location  supine left LAD  -TB  --     Manual Rx 5 Type  belt used to help  -TB  --     Manual Rx 5 Duration  5  -TB  --        Manual Rx 6    Manual Rx 6 Location  left hip passive stretch   -TB  --     Manual Rx 6 Type  piri, OI  -TB  --     Manual Rx 6 Grade  low load prolonged  -TB  --     Manual Rx 6 Duration  5  -TB  --       User Key  (r) = Recorded By, (t) = Taken By, (c) = Cosigned By    Initials Name Provider Type    TB Khalif Live, PT Physical Therapist          PT OP Goals     Row Name 02/19/19 0800          Long Term Goals    LTG Date to Achieve  03/26/19  -TB     LTG 1  Improve tina hamstring SLR to 70 deg  -TB     LTG 1 Progress  Ongoing  -TB     LTG 2  Reports no pain at work except occasional minor twinges no more than 1-2/10  -TB     LTG 2 Progress  Ongoing  -TB     LTG 2 Progress Comments  had 1 painful episode but otherwise pain staying low at 1/10  -TB     LTG 3  Improve tina hip IR to 25 deg  -TB     LTG 3 Progress  New  -TB     LTG 4  Improve tina piriformis flexibility to WNL  -TB     LTG 4 Progress  New  -TB     LTG 5  Independent with HEP for flexibility  -TB     LTG 5 Progress  New  -TB         Time Calculation    PT Goal Re-Cert Due Date  03/14/19  -       User Key  (r) = Recorded By, (t) = Taken By, (c) = Cosigned By    Initials Name Provider Type    TB Khalif Live, PT Physical Therapist          Therapy Education  Education Details: piri vs OI stretch  Given: HEP, Posture/body mechanics, Symptoms/condition management  Program: New  How Provided: Verbal, Demonstration  Provided to: Patient  Level of Understanding: Teach back education performed, Verbalized, Demonstrated              Time Calculation:   Start Time: 0800  Stop Time: 0845  Time Calculation (min): 45 min  Total Timed Code Minutes- PT: 45 minute(s)  Therapy Suggested Charges     Code   Minutes Charges    85538 (CPT®) Hc Pt Neuromusc Re Education Ea 15 Min      97217 (CPT®) Hc Pt Ther Proc Ea 15 Min      66912 (CPT®) Hc Gait Training Ea 15 Min      73921 (CPT®) Hc Pt Therapeutic Act Ea 15 Min      19416 (CPT®) Hc Pt Manual Therapy Ea 15 Min 45 3    44443 (CPT®) Hc Pt Ther Massage- Per 15 Min      00830 (CPT®) Hc Pt Iontophoresis Ea 15 Min      52855 (CPT®) Hc Pt Elec Stim Ea-Per 15 Min      47403 (CPT®) Hc Pt Ultrasound Ea 15 Min      44982 (CPT®) Hc Pt Self Care/Mgmt/Train Ea 15 Min      23832 (CPT®) Hc Pt Prosthetic (S) Train Initial Encounter, Each 15 Min      36059 (CPT®) Hc Orthotic(S) Mgmt/Train Initial Encounter, Each 15min      63494 (CPT®) Hc Pt Aquatic Therapy Ea 15 Min      26531 (CPT®) Hc Pt Orthotic(S)/Prosthetic(S) Encounter, Each 15 Min       (CPT®) Hc Pt Electrical Stim Unattended      Total  45 3        Therapy Charges for Today     Code Description Service Date Service Provider Modifiers Qty    01460771215 HC PT MANUAL THERAPY EA 15 MIN 2/19/2019 Khalif Live, PT GP 3                    Khalif Live, PT  2/19/2019

## 2019-02-22 ENCOUNTER — HOSPITAL ENCOUNTER (OUTPATIENT)
Dept: PHYSICAL THERAPY | Facility: HOSPITAL | Age: 44
Setting detail: THERAPIES SERIES
Discharge: HOME | End: 2019-02-22

## 2019-02-22 DIAGNOSIS — M25.552 LEFT HIP PAIN: Primary | ICD-10-CM

## 2019-02-22 PROCEDURE — 97110 THERAPEUTIC EXERCISES: CPT | Performed by: PHYSICAL THERAPIST

## 2019-02-22 PROCEDURE — 97140 MANUAL THERAPY 1/> REGIONS: CPT | Performed by: PHYSICAL THERAPIST

## 2019-02-23 NOTE — THERAPY TREATMENT NOTE
Outpatient Physical Therapy Ortho Treatment Note  [facility]     Patient Name: Carlos Mena  : 1975  MRN: [id number]  Today's Date: 2019      Visit Date: 2019    Visit Dx:    ICD-10-CM ICD-9-CM   1. Left hip pain M25.552 719.45       Patient Active Problem List   Diagnosis   • Hypogonadism in male   • Essential hypertension   • Hypercholesterolemia   • Hypopituitarism (CMS/HCC)   • Central hypothyroidism   • Moderate persistent asthma without complication   • Gastroesophageal reflux disease without esophagitis   • Thyroid disease        Past Medical History:   Diagnosis Date   • Essential hypertension 8/3/2018   • Hypercholesterolemia 8/3/2018   • Hypogonadism in male 8/3/2018        No past surgical history on file.                    PT Assessment/Plan     Row Name 19 1800          PT Assessment    Assessment Comments  His pain is doing better although his left hip is still tighter to him. We progressed more to yoga and active stretching and stability today. He has one more visit so we need to decide if he needs to cont.  -TB        PT Plan    PT Plan Comments  If he's doing well, then progress to an ind HEP. If he struggles with the new exs, we may need to schedule more.  -TB       User Key  (r) = Recorded By, (t) = Taken By, (c) = Cosigned By    Initials Name Provider Type    TB Khalif Live, PT Physical Therapist              Exercises     Row Name 19 1800 19 1640          Subjective Comments    Subjective Comments  --  His left hip has been doing well. It's a little stiff today from the cold, damp weather. He has had no flares of pain since his last time. He's wore his SI belt his whole time.   -TB        Subjective Pain    Pre-Treatment Pain Level  --  1  -TB        Total Minutes    03347 - PT Therapeutic Exercise Minutes  10  -TB  --     48669 - PT Manual Therapy Minutes  35  -TB  --        Exercise 1    Exercise Name 1  --  quadruped mulekick tina  -TB     Sets 1   --  3  -TB     Reps 1  --  20 sec hold each  -TB     Additional Comments  --  pulled knee into chest without resting during break; cued ot keep hips neutral  -TB        Exercise 2    Exercise Name 2  --  proud and exalted warrior tina directions  -TB     Time 2  --  1 min hold each  -TB     Additional Comments  --  cued for alignment; he reported his left hip was weaker  -TB        Exercise 3    Exercise Name 3  --  supine tina passive ham stretch using contract/relax/hold  -TB     Time 3  --  2 min each ham  -TB        Exercise 4    Exercise Name 4  --  downward facing dog: stretched for calves and hams tina  -TB     Time 4  --  3 min  -TB       User Key  (r) = Recorded By, (t) = Taken By, (c) = Cosigned By    Initials Name Provider Type    TB Khalif Live, PT Physical Therapist                        Manual Rx (last 36 hours)      Manual Treatments     Row Name 02/22/19 1800 02/22/19 1640          Total Minutes    96200 - PT Manual Therapy Minutes  35  -TB  --        Manual Rx 1    Manual Rx 1 Location  --  prone thoracic  -TB     Manual Rx 1 Type  --  IASTM with foam roll and manual OP  -TB     Manual Rx 1 Grade  --  several cavitations  -TB     Manual Rx 1 Duration  --  10  -TB        Manual Rx 2    Manual Rx 2 Location  --  prone sacral LENA tina PA  -TB     Manual Rx 2 Grade  --  3 repetitive  -TB     Manual Rx 2 Duration  --  5  -TB        Manual Rx 3    Manual Rx 3 Location  --  prone deep pressure left glute  -TB     Manual Rx 3 Type  --  upper glute, piri and other deep hip ext rotators  -TB     Manual Rx 3 Duration  --  10  -TB        Manual Rx 4    Manual Rx 4 Location  --  --  -TB     Manual Rx 4 Type  --  --  -TB     Manual Rx 4 Grade  --  --  -TB     Manual Rx 4 Duration  --  --  -TB        Manual Rx 5    Manual Rx 5 Location  --  supine left LAD  -TB     Manual Rx 5 Type  --  belt used to help  -TB     Manual Rx 5 Duration  --  5  -TB        Manual Rx 6    Manual Rx 6 Location  --  left hip passive  stretch   -TB     Manual Rx 6 Type  --  piri, OI  -TB     Manual Rx 6 Grade  --  low load prolonged  -TB     Manual Rx 6 Duration  --  5  -TB       User Key  (r) = Recorded By, (t) = Taken By, (c) = Cosigned By    Initials Name Provider Type    Khalif Bhatti, PT Physical Therapist          PT OP Goals     Row Name 02/22/19 1800          Long Term Goals    LTG Date to Achieve  03/26/19  -TB     LTG 1  Improve tina hamstring SLR to 70 deg  -TB     LTG 1 Progress  Ongoing  -TB     LTG 1 Progress Comments  right to 70 deg; left 60 deg  -TB     LTG 2  Reports no pain at work except occasional minor twinges no more than 1-2/10  -TB     LTG 2 Progress  Ongoing  -TB     LTG 2 Progress Comments  1/10 consistently  -TB     LTG 3  Improve tina hip IR to 25 deg  -TB     LTG 3 Progress  New  -TB     LTG 4  Improve tina piriformis flexibility to WNL  -TB     LTG 4 Progress  New  -TB     LTG 5  Independent with HEP for flexibility  -TB     LTG 5 Progress  New  -TB        Time Calculation    PT Goal Re-Cert Due Date  03/14/19  -TB       User Key  (r) = Recorded By, (t) = Taken By, (c) = Cosigned By    Initials Name Provider Type    Khalif Bhatti, PT Physical Therapist          Therapy Education  Education Details: mulekick; piri stretch; warrior poses  Given: HEP, Posture/body mechanics, Symptoms/condition management  Program: New  How Provided: Verbal, Demonstration  Provided to: Patient  Level of Understanding: Teach back education performed, Verbalized, Demonstrated              Time Calculation:   Start Time: 1640  Stop Time: 1725  Time Calculation (min): 45 min  Total Timed Code Minutes- PT: 45 minute(s)  Therapy Suggested Charges     Code   Minutes Charges    12126 (CPT®) Hc Pt Neuromusc Re Education Ea 15 Min      71359 (CPT®) Hc Pt Ther Proc Ea 15 Min 10 1    47292 (CPT®) Hc Gait Training Ea 15 Min      47992 (CPT®) Hc Pt Therapeutic Act Ea 15 Min      70654 (CPT®) Hc Pt Manual Therapy Ea 15 Min 35 2    30292  (CPT®) Hc Pt Ther Massage- Per 15 Min      29178 (CPT®) Hc Pt Iontophoresis Ea 15 Min      64343 (CPT®) Hc Pt Elec Stim Ea-Per 15 Min      02950 (CPT®) Hc Pt Ultrasound Ea 15 Min      93329 (CPT®) Hc Pt Self Care/Mgmt/Train Ea 15 Min      39720 (CPT®) Hc Pt Prosthetic (S) Train Initial Encounter, Each 15 Min      70456 (CPT®) Hc Orthotic(S) Mgmt/Train Initial Encounter, Each 15min      93016 (CPT®) Hc Pt Aquatic Therapy Ea 15 Min      76323 (CPT®) Hc Pt Orthotic(S)/Prosthetic(S) Encounter, Each 15 Min       (CPT®) Hc Pt Electrical Stim Unattended      Total  45 3        Therapy Charges for Today     Code Description Service Date Service Provider Modifiers Qty    [id number] HC PT THER PROC EA 15 MIN 2/22/2019 Khalif Live, PT GP 1    [id number] HC PT MANUAL THERAPY EA 15 MIN 2/22/2019 Khalif Live, PT GP 2                    Khalif Live, PT  2/22/2019

## 2019-02-27 ENCOUNTER — HOSPITAL ENCOUNTER (OUTPATIENT)
Dept: PHYSICAL THERAPY | Facility: HOSPITAL | Age: 44
Setting detail: THERAPIES SERIES
Discharge: HOME | End: 2019-02-27

## 2019-02-27 DIAGNOSIS — M25.552 LEFT HIP PAIN: Primary | ICD-10-CM

## 2019-02-27 PROCEDURE — 97035 APP MDLTY 1+ULTRASOUND EA 15: CPT | Performed by: PHYSICAL THERAPIST

## 2019-02-27 PROCEDURE — 97140 MANUAL THERAPY 1/> REGIONS: CPT | Performed by: PHYSICAL THERAPIST

## 2019-02-27 NOTE — THERAPY TREATMENT NOTE
Outpatient Physical Therapy Ortho Treatment Note  [facility]     Patient Name: Carlos Mena  : 1975  MRN: [id number]  Today's Date: 2019      Visit Date: 2019    Visit Dx:    ICD-10-CM ICD-9-CM   1. Left hip pain M25.552 719.45       Patient Active Problem List   Diagnosis   • Hypogonadism in male   • Essential hypertension   • Hypercholesterolemia   • Hypopituitarism (CMS/HCC)   • Central hypothyroidism   • Moderate persistent asthma without complication   • Gastroesophageal reflux disease without esophagitis   • Thyroid disease        Past Medical History:   Diagnosis Date   • Essential hypertension 8/3/2018   • Hypercholesterolemia 8/3/2018   • Hypogonadism in male 8/3/2018        No past surgical history on file.                    PT Assessment/Plan     Row Name 19 0800          PT Assessment    Assessment Comments  He may have overstretched left left posterior hip so I advised him to back off some of the stretches. His left SI appears to be doing well but his hip was his primary pain today with secondary muscle guarding posteriorly  -TB        PT Plan    PT Plan Comments  Resume flexibility if his hip has calmed down. Move toward doing some yoga types of stretching regimens.  -TB       User Key  (r) = Recorded By, (t) = Taken By, (c) = Cosigned By    Initials Name Provider Type    Khalif Bhatti, PT Physical Therapist          Modalities     Row Name 19 0800             Ultrasound 50284    Location  left piriformis  -TB      Frequency  1.0 MHz  -TB      Intensity - Wts/cm  1.5  -TB      78300 - PT Ultrasound Minutes  10  -TB        User Key  (r) = Recorded By, (t) = Taken By, (c) = Cosigned By    Initials Name Provider Type    Khalif Bhatti, PT Physical Therapist          Exercises     Row Name 19 0800             Subjective Comments    Subjective Comments  He says Saturday, his left glute and SI. He doesn't know why it flared.   -TB         Subjective Pain     Pre-Treatment Pain Level  4  -TB         Total Minutes    54083 - PT Manual Therapy Minutes  30  -TB        User Key  (r) = Recorded By, (t) = Taken By, (c) = Cosigned By    Initials Name Provider Type    Khalif Bhatti PT Physical Therapist                        Manual Rx (last 36 hours)      Manual Treatments     Row Name 02/27/19 0800             Total Minutes    37069 - PT Manual Therapy Minutes  30  -TB         Manual Rx 1    Manual Rx 1 Location  sidelying left piri and other hip ER's including OI  -TB      Manual Rx 1 Type  STM and deep pressure release as tolerated  -TB      Manual Rx 1 Duration  25  -TB         Manual Rx 2    Manual Rx 2 Location  gentle left piri and OI stretch  -TB      Manual Rx 2 Type  painful so did push too hard  -TB      Manual Rx 2 Duration  5  -TB        User Key  (r) = Recorded By, (t) = Taken By, (c) = Cosigned By    Initials Name Provider Type    Khalif Bhatti PT Physical Therapist          PT OP Goals     Row Name 02/27/19 0800          Long Term Goals    LTG Date to Achieve  03/26/19  -TB     LTG 1  Improve tina hamstring SLR to 70 deg  -TB     LTG 1 Progress  Ongoing  -TB     LTG 2  Reports no pain at work except occasional minor twinges no more than 1-2/10  -TB     LTG 2 Progress  Ongoing  -TB     LTG 2 Progress Comments  worse today at 4/10  -TB     LTG 3  Improve tina hip IR to 25 deg  -TB     LTG 3 Progress  New  -TB     LTG 4  Improve tina piriformis flexibility to WNL  -TB     LTG 4 Progress  New  -TB     LTG 5  Independent with HEP for flexibility  -TB     LTG 5 Progress  New  -TB        Time Calculation    PT Goal Re-Cert Due Date  03/14/19  -TB       User Key  (r) = Recorded By, (t) = Taken By, (c) = Cosigned By    Initials Name Provider Type    Khalif Bhatti PT Physical Therapist          Therapy Education  Given: HEP, Posture/body mechanics, Symptoms/condition management  Program: New  How Provided: Verbal, Demonstration  Provided to:  Patient  Level of Understanding: Teach back education performed, Verbalized, Demonstrated              Time Calculation:   Start Time: 0805  Stop Time: 0845  Time Calculation (min): 40 min  Total Timed Code Minutes- PT: 40 minute(s)  Therapy Suggested Charges     Code   Minutes Charges    18193 (CPT®) Hc Pt Neuromusc Re Education Ea 15 Min      02986 (CPT®) Hc Pt Ther Proc Ea 15 Min      01189 (CPT®) Hc Gait Training Ea 15 Min      15163 (CPT®) Hc Pt Therapeutic Act Ea 15 Min      95009 (CPT®) Hc Pt Manual Therapy Ea 15 Min 30 2    30350 (CPT®) Hc Pt Ther Massage- Per 15 Min      50084 (CPT®) Hc Pt Iontophoresis Ea 15 Min      25596 (CPT®) Hc Pt Elec Stim Ea-Per 15 Min      83875 (CPT®) Hc Pt Ultrasound Ea 15 Min 10 1    95272 (CPT®) Hc Pt Self Care/Mgmt/Train Ea 15 Min      12843 (CPT®) Hc Pt Prosthetic (S) Train Initial Encounter, Each 15 Min      10687 (CPT®) Hc Orthotic(S) Mgmt/Train Initial Encounter, Each 15min      55146 (CPT®) Hc Pt Aquatic Therapy Ea 15 Min      76307 (CPT®) Hc Pt Orthotic(S)/Prosthetic(S) Encounter, Each 15 Min       (CPT®) Hc Pt Electrical Stim Unattended      Total  40 3        Therapy Charges for Today     Code Description Service Date Service Provider Modifiers Qty    05358860692 HC PT MANUAL THERAPY EA 15 MIN 2/27/2019 Khalif Live, PT GP 2    75032106721 HC PT ULTRASOUND EA 15 MIN 2/27/2019 Khalif Live, PT GP 1                    Khalif Live, PT  2/27/2019

## 2019-03-01 ENCOUNTER — HOSPITAL ENCOUNTER (OUTPATIENT)
Dept: PHYSICAL THERAPY | Facility: HOSPITAL | Age: 44
Setting detail: THERAPIES SERIES
Discharge: HOME | End: 2019-03-01

## 2019-03-01 DIAGNOSIS — M25.552 LEFT HIP PAIN: Primary | ICD-10-CM

## 2019-03-01 PROCEDURE — 97140 MANUAL THERAPY 1/> REGIONS: CPT | Performed by: PHYSICAL THERAPIST

## 2019-03-01 PROCEDURE — 97032 APPL MODALITY 1+ESTIM EA 15: CPT | Performed by: PHYSICAL THERAPIST

## 2019-03-02 NOTE — THERAPY TREATMENT NOTE
Outpatient Physical Therapy Ortho Treatment Note  [facility]     Patient Name: Carlos Mena  : 1975  MRN: [id number]  Today's Date: 3/1/2019      Visit Date: 2019    Visit Dx:    ICD-10-CM ICD-9-CM   1. Left hip pain M25.552 719.45       Patient Active Problem List   Diagnosis   • Hypogonadism in male   • Essential hypertension   • Hypercholesterolemia   • Hypopituitarism (CMS/HCC)   • Central hypothyroidism   • Moderate persistent asthma without complication   • Gastroesophageal reflux disease without esophagitis   • Thyroid disease        Past Medical History:   Diagnosis Date   • Essential hypertension 8/3/2018   • Hypercholesterolemia 8/3/2018   • Hypogonadism in male 8/3/2018        No past surgical history on file.                    PT Assessment/Plan     Row Name 19 1800          PT Assessment    Assessment Comments  I still took it easy today as he was just now feeling better and I didnt' want to flare him up.  -TB        PT Plan    PT Plan Comments  Resume hip mobs if able ot tolerate  -TB       User Key  (r) = Recorded By, (t) = Taken By, (c) = Cosigned By    Initials Name Provider Type    TB Khalif Live, PT Physical Therapist          Modalities     Row Name 19 1700             ELECTRICAL STIMULATION    Attended/Unattended  Attended  -TB      Stimulation Type  Pre-Mod combo cold laser  -TB      Location/Electrode Placement/Other  left SI, posterior and anterior hip  -TB      86367 - PT E-Stim Attended (Manual) Minutes  15  -TB        User Key  (r) = Recorded By, (t) = Taken By, (c) = Cosigned By    Initials Name Provider Type    TB Khalif Live, PT Physical Therapist          Exercises     Row Name 19 1700 19 1630          Subjective Comments    Subjective Comments  --  He had a bad day yesterday but today was a good day.   -TB        Subjective Pain    Pre-Treatment Pain Level  --  1  -TB        Total Minutes    87957 - PT Manual Therapy Minutes  30   -TB  --       User Key  (r) = Recorded By, (t) = Taken By, (c) = Cosigned By    Initials Name Provider Type    Khalif Bhatti PT Physical Therapist                        Manual Rx (last 36 hours)      Manual Treatments     Row Name 03/01/19 1700             Total Minutes    39622 - PT Manual Therapy Minutes  30  -TB         Manual Rx 1    Manual Rx 1 Location  sidelying left piri and other hip ER's including OI  -TB      Manual Rx 1 Type  STM and deep pressure release as tolerated  -TB      Manual Rx 1 Duration  20  -TB         Manual Rx 2    Manual Rx 2 Location  left glute  -TB      Manual Rx 2 Type  hard vibrating roller  -TB      Manual Rx 2 Duration  10  -TB        User Key  (r) = Recorded By, (t) = Taken By, (c) = Cosigned By    Initials Name Provider Type    Khalif Bhatti PT Physical Therapist          PT OP Goals     Row Name 03/01/19 1800          Long Term Goals    LTG Date to Achieve  03/26/19  -TB     LTG 1  Improve tina hamstring SLR to 70 deg  -TB     LTG 1 Progress  Ongoing  -TB     LTG 2  Reports no pain at work except occasional minor twinges no more than 1-2/10  -TB     LTG 2 Progress  Ongoing  -TB     LTG 2 Progress Comments  1/10 today; yesterday was worse  -TB     LTG 3  Improve tina hip IR to 25 deg  -TB     LTG 3 Progress  New  -TB     LTG 4  Improve tina piriformis flexibility to WNL  -TB     LTG 4 Progress  New  -TB     LTG 5  Independent with HEP for flexibility  -TB     LTG 5 Progress  New  -TB        Time Calculation    PT Goal Re-Cert Due Date  03/14/19  -TB       User Key  (r) = Recorded By, (t) = Taken By, (c) = Cosigned By    Initials Name Provider Type    Khalif Bhatti PT Physical Therapist          Therapy Education  Given: HEP, Posture/body mechanics, Symptoms/condition management  Program: New  How Provided: Verbal, Demonstration  Provided to: Patient  Level of Understanding: Teach back education performed, Verbalized, Demonstrated              Time Calculation:    Start Time: 1630  Stop Time: 1715  Time Calculation (min): 45 min  Total Timed Code Minutes- PT: 45 minute(s)  Therapy Suggested Charges     Code   Minutes Charges    87582 (CPT®) Hc Pt Neuromusc Re Education Ea 15 Min      09678 (CPT®) Hc Pt Ther Proc Ea 15 Min      08223 (CPT®) Hc Gait Training Ea 15 Min      81333 (CPT®) Hc Pt Therapeutic Act Ea 15 Min      78613 (CPT®) Hc Pt Manual Therapy Ea 15 Min 30 2    81025 (CPT®) Hc Pt Ther Massage- Per 15 Min      20930 (CPT®) Hc Pt Iontophoresis Ea 15 Min      55081 (CPT®) Hc Pt Elec Stim Ea-Per 15 Min 15 1    14128 (CPT®) Hc Pt Ultrasound Ea 15 Min      05169 (CPT®) Hc Pt Self Care/Mgmt/Train Ea 15 Min      32281 (CPT®) Hc Pt Prosthetic (S) Train Initial Encounter, Each 15 Min      65460 (CPT®) Hc Orthotic(S) Mgmt/Train Initial Encounter, Each 15min      25178 (CPT®) Hc Pt Aquatic Therapy Ea 15 Min      59786 (CPT®) Hc Pt Orthotic(S)/Prosthetic(S) Encounter, Each 15 Min       (CPT®) Hc Pt Electrical Stim Unattended      Total  45 3        Therapy Charges for Today     Code Description Service Date Service Provider Modifiers Qty    66599040385 HC PT MANUAL THERAPY EA 15 MIN 3/1/2019 Khalif Live, PT GP 2    29887800330 HC PT ELEC STIM EA-PER 15 MIN 3/1/2019 Khalif Live, PT GP 1                    Khalif Live, PT  3/1/2019

## 2019-03-05 ENCOUNTER — HOSPITAL ENCOUNTER (OUTPATIENT)
Dept: PHYSICAL THERAPY | Facility: HOSPITAL | Age: 44
Setting detail: THERAPIES SERIES
Discharge: HOME | End: 2019-03-05

## 2019-03-05 DIAGNOSIS — M25.552 LEFT HIP PAIN: Primary | ICD-10-CM

## 2019-03-05 PROCEDURE — 97140 MANUAL THERAPY 1/> REGIONS: CPT | Performed by: PHYSICAL THERAPIST

## 2019-03-05 PROCEDURE — 97110 THERAPEUTIC EXERCISES: CPT | Performed by: PHYSICAL THERAPIST

## 2019-03-05 NOTE — THERAPY TREATMENT NOTE
Outpatient Physical Therapy Ortho Treatment Note  [facility]     Patient Name: Carlos Mena  : 1975  MRN: [id number]  Today's Date: 3/5/2019      Visit Date: 2019    Visit Dx:    ICD-10-CM ICD-9-CM   1. Left hip pain M25.552 719.45       Patient Active Problem List   Diagnosis   • Hypogonadism in male   • Essential hypertension   • Hypercholesterolemia   • Hypopituitarism (CMS/HCC)   • Central hypothyroidism   • Moderate persistent asthma without complication   • Gastroesophageal reflux disease without esophagitis   • Thyroid disease        Past Medical History:   Diagnosis Date   • Essential hypertension 8/3/2018   • Hypercholesterolemia 8/3/2018   • Hypogonadism in male 8/3/2018        No past surgical history on file.                    PT Assessment/Plan     Row Name 19 1700          PT Assessment    Assessment Comments  He was able to see how weak his left hip was today with the ttubing exs. His pain is staying low as his loosens his hip before getting out of bed.  -TB        PT Plan    PT Plan Comments  Continue with hip stability and mobility of left hip  -TB       User Key  (r) = Recorded By, (t) = Taken By, (c) = Cosigned By    Initials Name Provider Type    TB Khalif Live, PT Physical Therapist              Exercises     Row Name 19 1700 19 1630          Subjective Comments    Subjective Comments  --  He says yesterday was rough but today has been really good.  -TB        Subjective Pain    Pre-Treatment Pain Level  --  1  -TB        Total Minutes    42744 - PT Therapeutic Exercise Minutes  30  -TB  --     08004 - PT Manual Therapy Minutes  15  -TB  --        Exercise 1    Exercise Name 1  --  seated hip abd/IR against great ttubing  -TB     Reps 1  --  10  -TB     Time 1  --  10 sec hold  -TB        Exercise 2    Exercise Name 2  --  seated hip ER against green ttubing; tina  -TB     Reps 2  --  6  -TB     Time 2  --  10 sec hold  -TB     Additional Comments  --   strapped tubing to leg of plinth  -TB        Exercise 3    Exercise Name 3  --  standing side stepping against green ttubing around ankles  -TB     Time 3  --  4  -TB     Additional Comments  --  used mirror and cues to keep shoulders level and slow movements down  -TB        Exercise 4    Exercise Name 4  --  standing hip airplane against green ttubing  -TB     Reps 4  --  2 each leg  -TB     Time 4  --  15 sec  -TB     Additional Comments  --  // bars used for UE support  -TB        Exercise 5    Exercise Name 5  --  passive left piriformis, OI and ham stretch left  -TB       User Key  (r) = Recorded By, (t) = Taken By, (c) = Cosigned By    Initials Name Provider Type    TB Khalif Live, PT Physical Therapist                        Manual Rx (last 36 hours)      Manual Treatments     Row Name 03/05/19 1700 03/05/19 1630          Total Minutes    71304 - PT Manual Therapy Minutes  15  -TB  --        Manual Rx 1    Manual Rx 1 Location  --  sidelying left piri and other hip ER's including OI  -TB     Manual Rx 1 Type  --  STM and deep pressure release as tolerated  -TB     Manual Rx 1 Duration  --  15  -TB       User Key  (r) = Recorded By, (t) = Taken By, (c) = Cosigned By    Initials Name Provider Type    TB Khalif Live, PT Physical Therapist          PT OP Goals     Row Name 03/05/19 1700          Long Term Goals    LTG Date to Achieve  03/26/19  -TB     LTG 1  Improve tina hamstring SLR to 70 deg  -TB     LTG 1 Progress  Ongoing  -TB     LTG 2  Reports no pain at work except occasional minor twinges no more than 1-2/10  -TB     LTG 2 Progress  Ongoing  -TB     LTG 2 Progress Comments  pain <1/10 today  -TB     LTG 3  Improve tina hip IR to 25 deg  -TB     LTG 3 Progress  New  -TB     LTG 4  Improve tina piriformis flexibility to WNL  -TB     LTG 4 Progress  New  -TB     LTG 5  Independent with HEP for flexibility  -TB     LTG 5 Progress  New  -TB        Time Calculation    PT Goal Re-Cert Due Date   03/14/19  -TB       User Key  (r) = Recorded By, (t) = Taken By, (c) = Cosigned By    Initials Name Provider Type    TB Khalif Live, PT Physical Therapist          Therapy Education  Given: HEP, Posture/body mechanics, Symptoms/condition management  Program: New  How Provided: Verbal, Demonstration  Provided to: Patient  Level of Understanding: Teach back education performed, Verbalized, Demonstrated              Time Calculation:   Start Time: 1635  Stop Time: 1720  Time Calculation (min): 45 min  Total Timed Code Minutes- PT: 45 minute(s)  Therapy Suggested Charges     Code   Minutes Charges    26015 (CPT®) Hc Pt Neuromusc Re Education Ea 15 Min      64393 (CPT®) Hc Pt Ther Proc Ea 15 Min 30 2    77107 (CPT®) Hc Gait Training Ea 15 Min      08887 (CPT®) Hc Pt Therapeutic Act Ea 15 Min      96004 (CPT®) Hc Pt Manual Therapy Ea 15 Min 15 1    35751 (CPT®) Hc Pt Ther Massage- Per 15 Min      20744 (CPT®) Hc Pt Iontophoresis Ea 15 Min      23782 (CPT®) Hc Pt Elec Stim Ea-Per 15 Min      24412 (CPT®) Hc Pt Ultrasound Ea 15 Min      38802 (CPT®) Hc Pt Self Care/Mgmt/Train Ea 15 Min      44647 (CPT®) Hc Pt Prosthetic (S) Train Initial Encounter, Each 15 Min      95800 (CPT®) Hc Orthotic(S) Mgmt/Train Initial Encounter, Each 15min      71066 (CPT®) Hc Pt Aquatic Therapy Ea 15 Min      88679 (CPT®) Hc Pt Orthotic(S)/Prosthetic(S) Encounter, Each 15 Min       (CPT®) Hc Pt Electrical Stim Unattended      Total  45 3        Therapy Charges for Today     Code Description Service Date Service Provider Modifiers Qty    26183039407 HC PT THER PROC EA 15 MIN 3/5/2019 Khalif Live, PT GP 2    12496766828 HC PT MANUAL THERAPY EA 15 MIN 3/5/2019 Khalif Live, PT GP 1                    Khalif Live, PT  3/5/2019

## 2019-03-07 ENCOUNTER — HOSPITAL ENCOUNTER (OUTPATIENT)
Dept: PHYSICAL THERAPY | Facility: HOSPITAL | Age: 44
Setting detail: THERAPIES SERIES
Discharge: HOME | End: 2019-03-07

## 2019-03-07 DIAGNOSIS — M25.552 LEFT HIP PAIN: Primary | ICD-10-CM

## 2019-03-07 PROCEDURE — 97032 APPL MODALITY 1+ESTIM EA 15: CPT | Performed by: PHYSICAL THERAPIST

## 2019-03-07 PROCEDURE — 97140 MANUAL THERAPY 1/> REGIONS: CPT | Performed by: PHYSICAL THERAPIST

## 2019-03-07 PROCEDURE — 97110 THERAPEUTIC EXERCISES: CPT | Performed by: PHYSICAL THERAPIST

## 2019-03-07 NOTE — THERAPY TREATMENT NOTE
Outpatient Physical Therapy Ortho Treatment Note  [facility]     Patient Name: Carlos Mena  : 1975  MRN: [id number]  Today's Date: 3/7/2019      Visit Date: 2019    Visit Dx:    ICD-10-CM ICD-9-CM   1. Left hip pain M25.552 719.45       Patient Active Problem List   Diagnosis   • Hypogonadism in male   • Essential hypertension   • Hypercholesterolemia   • Hypopituitarism (CMS/HCC)   • Central hypothyroidism   • Moderate persistent asthma without complication   • Gastroesophageal reflux disease without esophagitis   • Thyroid disease        Past Medical History:   Diagnosis Date   • Essential hypertension 8/3/2018   • Hypercholesterolemia 8/3/2018   • Hypogonadism in male 8/3/2018        No past surgical history on file.                    PT Assessment/Plan     Row Name 19 1600          PT Assessment    Assessment Comments  he continues to do well with the SI stabiltiy exs we are working on. He doesn't do we with standing too long but his pain didn't go up  -TB        PT Plan    PT Plan Comments  cont progressive hip stability  -TB       User Key  (r) = Recorded By, (t) = Taken By, (c) = Cosigned By    Initials Name Provider Type    TB Khalif Live, PT Physical Therapist          Modalities     Row Name 19 1545             ELECTRICAL STIMULATION    Attended/Unattended  Attended  -TB      Stimulation Type  Pre-Mod combo cold laser  -TB      Location/Electrode Placement/Other  left SI/L5  -TB      85273 - PT E-Stim Attended (Manual) Minutes  15  -TB        User Key  (r) = Recorded By, (t) = Taken By, (c) = Cosigned By    Initials Name Provider Type    Khalif Bhatti, PT Physical Therapist          Exercises     Row Name 19 1544             Subjective Comments    Subjective Comments  He says he felt really good after he left but today, he stood shopping with his wife and was really stiff. He hasn't had to take any ibuprofen today.   -TB         Subjective Pain     Pre-Treatment Pain Level  1  -TB         Total Minutes    69633 - PT Therapeutic Exercise Minutes  30  -TB      17993 - PT Manual Therapy Minutes  15  -TB         Exercise 1    Exercise Name 1  seated hip abd/IR against green ttubing  -TB      Reps 1  10  -TB      Time 1  10 sec hold  -TB         Exercise 2    Exercise Name 2  seated hip ER against green ttubing; tina  -TB      Reps 2  6  -TB      Time 2  10 sec hold  -TB         Exercise 3    Exercise Name 3  standing hip abd against green ttubing  -TB      Reps 3  2  -TB      Time 3  15 sec  -TB         Exercise 4    Exercise Name 4  standing hip airplane against green ttubing  -TB      Reps 4  2 each leg  -TB      Time 4  15 sec  -TB         Exercise 5    Exercise Name 5  --  -TB        User Key  (r) = Recorded By, (t) = Taken By, (c) = Cosigned By    Initials Name Provider Type    TB Khalif Live, PT Physical Therapist                        Manual Rx (last 36 hours)      Manual Treatments     Row Name 03/07/19 1545             Total Minutes    23557 - PT Manual Therapy Minutes  15  -TB         Manual Rx 1    Manual Rx 1 Location  sidelying left piri and other hip ER's including OI  -TB      Manual Rx 1 Type  STM and deep pressure release as tolerated  -TB      Manual Rx 1 Grade  also foam roll to left glute  -TB      Manual Rx 1 Duration  15  -TB         Manual Rx 2    Manual Rx 2 Location  lumbar roll left L5/S1  -TB      Manual Rx 2 Type  no cavitation  -TB      Manual Rx 2 Duration  2  -TB         Manual Rx 3    Manual Rx 3 Location  supine left LAD  -TB      Manual Rx 3 Grade  sustained gentle OP  -TB      Manual Rx 3 Duration  1 min; c/o pain so stopped  -TB         Manual Rx 4    Manual Rx 4 Location  supine lumbar flexion/left rotation stretch  -TB      Manual Rx 4 Type  OP at sacrum  -TB      Manual Rx 4 Duration  2  -TB        User Key  (r) = Recorded By, (t) = Taken By, (c) = Cosigned By    Initials Name Provider Type    Khalif Bhatti, PT  Physical Therapist          PT OP Goals     Row Name 03/07/19 1600          Long Term Goals    LTG Date to Achieve  03/26/19  -TB     LTG 1  Improve tina hamstring SLR to 70 deg  -TB     LTG 1 Progress  Ongoing  -TB     LTG 2  Reports no pain at work except occasional minor twinges no more than 1-2/10  -TB     LTG 2 Progress  Ongoing  -TB     LTG 2 Progress Comments  he still has a low level of pain; he was more stiff today  -TB     LTG 3  Improve tina hip IR to 25 deg  -TB     LTG 3 Progress  New  -TB     LTG 4  Improve tina piriformis flexibility to WNL  -TB     LTG 4 Progress  New  -TB     LTG 5  Independent with HEP for flexibility  -TB     LTG 5 Progress  New  -TB        Time Calculation    PT Goal Re-Cert Due Date  03/14/19  -TB       User Key  (r) = Recorded By, (t) = Taken By, (c) = Cosigned By    Initials Name Provider Type    Khalif Bhatti, PT Physical Therapist          Therapy Education  Given: HEP, Posture/body mechanics, Symptoms/condition management  Program: New  How Provided: Verbal, Demonstration  Provided to: Patient  Level of Understanding: Teach back education performed, Verbalized, Demonstrated              Time Calculation:   Start Time: 1545  Stop Time: 1645  Time Calculation (min): 60 min  Therapy Suggested Charges     Code   Minutes Charges    28370 (CPT®) Hc Pt Neuromusc Re Education Ea 15 Min      79230 (CPT®) Hc Pt Ther Proc Ea 15 Min 30 2    59099 (CPT®) Hc Gait Training Ea 15 Min      72359 (CPT®) Hc Pt Therapeutic Act Ea 15 Min      18549 (CPT®) Hc Pt Manual Therapy Ea 15 Min 15 1    65800 (CPT®) Hc Pt Ther Massage- Per 15 Min      26708 (CPT®) Hc Pt Iontophoresis Ea 15 Min      89503 (CPT®) Hc Pt Elec Stim Ea-Per 15 Min 15 1    50579 (CPT®) Hc Pt Ultrasound Ea 15 Min      70723 (CPT®) Hc Pt Self Care/Mgmt/Train Ea 15 Min      00110 (CPT®) Hc Pt Prosthetic (S) Train Initial Encounter, Each 15 Min      25590 (CPT®) Hc Orthotic(S) Mgmt/Train Initial Encounter, Each 15min      44020  (CPT®) Hc Pt Aquatic Therapy Ea 15 Min      33468 (CPT®) Hc Pt Orthotic(S)/Prosthetic(S) Encounter, Each 15 Min       (CPT®) Hc Pt Electrical Stim Unattended      Total  60 4        Therapy Charges for Today     Code Description Service Date Service Provider Modifiers Qty    70918683917 HC PT THER PROC EA 15 MIN 3/7/2019 Khalif Live, PT GP 2    15024240149 HC PT MANUAL THERAPY EA 15 MIN 3/7/2019 Khalif Live, PT GP 1    89151351773 HC PT ELEC STIM EA-PER 15 MIN 3/7/2019 Khalif Live, PT GP 1                    Khalif Live, PT  3/7/2019

## 2019-03-12 ENCOUNTER — APPOINTMENT (OUTPATIENT)
Dept: PHYSICAL THERAPY | Facility: HOSPITAL | Age: 44
End: 2019-03-12

## 2019-03-13 ENCOUNTER — HOSPITAL ENCOUNTER (OUTPATIENT)
Dept: PHYSICAL THERAPY | Facility: HOSPITAL | Age: 44
Setting detail: THERAPIES SERIES
Discharge: HOME | End: 2019-03-13

## 2019-03-13 DIAGNOSIS — M25.552 LEFT HIP PAIN: Primary | ICD-10-CM

## 2019-03-13 PROCEDURE — 97140 MANUAL THERAPY 1/> REGIONS: CPT | Performed by: PHYSICAL THERAPIST

## 2019-03-13 NOTE — THERAPY PROGRESS REPORT/RE-CERT
Outpatient Physical Therapy Ortho Progress Note   [place]     Patient Name: Carlos Mena  : 1975  MRN: [id number]  Today's Date: 3/13/2019      Visit Date: 2019    Visit Dx:    ICD-10-CM ICD-9-CM   1. Left hip pain M25.552 719.45       Patient Active Problem List   Diagnosis   • Hypogonadism in male   • Essential hypertension   • Hypercholesterolemia   • Hypopituitarism (CMS/HCC)   • Central hypothyroidism   • Moderate persistent asthma without complication   • Gastroesophageal reflux disease without esophagitis   • Thyroid disease        Past Medical History:   Diagnosis Date   • Essential hypertension 8/3/2018   • Hypercholesterolemia 8/3/2018   • Hypogonadism in male 8/3/2018        No past surgical history on file.                    PT Assessment/Plan     Row Name 19 1700          PT Assessment    Functional Limitations  Limitation in home management;Limitations in community activities;Performance in work activities;Limitations in functional capacity and performance;Performance in leisure activities  -TB     Impairments  Range of motion;Pain;Joint mobility;Posture;Impaired flexibility  -TB     Assessment Comments  As long as he wears his SI belt he does well. He is grossly tight everywhere and we saw increased ankle pain from his calf tightness which changes his gait mechanics. He has an increase in left glute pain a couple of weeks ago after more aggressive stretch and hip mobs so we've back off that.   -TB     Rehab Potential  Good  -TB     Patient/caregiver participated in establishment of treatment plan and goals  Yes  -TB     Patient would benefit from skilled therapy intervention  Yes  -TB        PT Plan    PT Frequency  2x/week  -TB     Predicted Duration of Therapy Intervention (Therapy Eval)  2-4 weeks  -TB     Planned CPT's?  PT THER PROC EA 15 MIN: 72194;PT THER ACT EA 15 MIN: 46290;PT MANUAL THERAPY EA 15 MIN: 96259;PT ELECTRICAL STIM UNATTEND: ;PT ELECTRICAL STIM ATTD  EA 15 MIN: 16770;PT ULTRASOUND EA 15 MIN: 48677  -TB     PT Plan Comments  cont working on hip mobility and stability and progress to a HEP and DC  -TB       User Key  (r) = Recorded By, (t) = Taken By, (c) = Cosigned By    Initials Name Provider Type    Khalif Bhatti, PT Physical Therapist              Exercises     Row Name 03/13/19 1700 03/13/19 1625          Subjective Comments    Subjective Comments  --  His c/c today is his ankles. His SI does OK as long as he uses his SI belt.  -TB        Subjective Pain    Pre-Treatment Pain Level  --  1  -TB        Total Minutes    81705 - PT Therapeutic Exercise Minutes  5  -TB  --     64716 - PT Manual Therapy Minutes  40  -TB  --        Exercise 1    Exercise Name 1  --  Prostretch tina ankle DF  -TB     Reps 1  --  2  -TB     Time 1  --  1 min each  -TB        Exercise 2    Exercise Name 2  --  instructed on gait with more stable hips and short foot to protect feet and ankle  -TB     Time 2  --  3  -TB       User Key  (r) = Recorded By, (t) = Taken By, (c) = Cosigned By    Initials Name Provider Type    Khalif Bhatti, PT Physical Therapist                        Manual Rx (last 36 hours)      Manual Treatments     Row Name 03/13/19 1700             Total Minutes    32163 - PT Manual Therapy Minutes  40  -TB         Manual Rx 1    Manual Rx 1 Location  prone left piriformis and upper glute  -TB      Manual Rx 1 Type  deep tissue release  -TB      Manual Rx 1 Duration  15  -TB         Manual Rx 2    Manual Rx 2 Location  tina gastroc  -TB      Manual Rx 2 Type  IASTM using edge tool  -TB      Manual Rx 2 Duration  10-15 min each  -TB        User Key  (r) = Recorded By, (t) = Taken By, (c) = Cosigned By    Initials Name Provider Type    Khalif Bhatti, PT Physical Therapist          PT OP Goals     Row Name 03/13/19 1700          Long Term Goals    LTG Date to Achieve  03/26/19  -TB     LTG 1  Improve tina hamstring SLR to 70 deg  -TB     LTG 1 Progress   Ongoing  -TB     LTG 1 Progress Comments  right 70, left 60  -TB     LTG 2  Reports no pain at work except occasional minor twinges no more than 1-2/10  -TB     LTG 2 Progress  Ongoing  -TB     LTG 2 Progress Comments  pain staying low with SI belt at 1/10  -TB     LTG 3  Improve tina hip IR to 25 deg  -TB     LTG 3 Progress  Ongoing  -TB     LTG 4  Improve tina piriformis flexibility to WNL  -TB     LTG 4 Progress  Ongoing  -TB     LTG 5  Independent with HEP for flexibility  -TB     LTG 5 Progress  Ongoing  -TB     LTG 5 Progress Comments  working on hip stability while trying to not overstretch  -TB        Time Calculation    PT Goal Re-Cert Due Date  04/12/19  -TB       User Key  (r) = Recorded By, (t) = Taken By, (c) = Cosigned By    Initials Name Provider Type    Khalif Bhatti, PT Physical Therapist          Therapy Education  Education Details: short foot, calf stretches  Given: HEP, Posture/body mechanics, Symptoms/condition management  Program: New  How Provided: Verbal, Demonstration  Provided to: Patient  Level of Understanding: Teach back education performed, Verbalized, Demonstrated              Time Calculation:   Start Time: 1630  Stop Time: 1715  Time Calculation (min): 45 min  Therapy Suggested Charges     Code   Minutes Charges    71203 (CPT®) Hc Pt Neuromusc Re Education Ea 15 Min      78133 (CPT®) Hc Pt Ther Proc Ea 15 Min 5     91960 (CPT®) Hc Gait Training Ea 15 Min      23047 (CPT®) Hc Pt Therapeutic Act Ea 15 Min      53708 (CPT®) Hc Pt Manual Therapy Ea 15 Min 40 3    01096 (CPT®) Hc Pt Ther Massage- Per 15 Min      61060 (CPT®) Hc Pt Iontophoresis Ea 15 Min      84000 (CPT®) Hc Pt Elec Stim Ea-Per 15 Min      86180 (CPT®) Hc Pt Ultrasound Ea 15 Min      03414 (CPT®) Hc Pt Self Care/Mgmt/Train Ea 15 Min      13067 (CPT®) Hc Pt Prosthetic (S) Train Initial Encounter, Each 15 Min      46624 (CPT®) Hc Orthotic(S) Mgmt/Train Initial Encounter, Each 15min      37000 (CPT®) Hc Pt Aquatic  Therapy Ea 15 Min      58432 (CPT®) Hc Pt Orthotic(S)/Prosthetic(S) Encounter, Each 15 Min       (CPT®) Hc Pt Electrical Stim Unattended      Total  45 3        Therapy Charges for Today     Code Description Service Date Service Provider Modifiers Qty    42132470825 HC PT MANUAL THERAPY EA 15 MIN 3/13/2019 Khalif Live, PT GP 3                    Khalif Live, PT  3/13/2019

## 2019-03-15 ENCOUNTER — HOSPITAL ENCOUNTER (OUTPATIENT)
Dept: PHYSICAL THERAPY | Facility: HOSPITAL | Age: 44
Setting detail: THERAPIES SERIES
Discharge: HOME | End: 2019-03-15

## 2019-03-15 DIAGNOSIS — M25.552 LEFT HIP PAIN: Primary | ICD-10-CM

## 2019-03-15 PROCEDURE — 97140 MANUAL THERAPY 1/> REGIONS: CPT | Performed by: PHYSICAL THERAPIST

## 2019-03-15 PROCEDURE — 97110 THERAPEUTIC EXERCISES: CPT | Performed by: PHYSICAL THERAPIST

## 2019-03-15 NOTE — THERAPY TREATMENT NOTE
Outpatient Physical Therapy Ortho Treatment Note  [facility]     Patient Name: Carlos Mena  : 1975  MRN: [id number]  Today's Date: 3/15/2019      Visit Date: 03/15/2019    Visit Dx:    ICD-10-CM ICD-9-CM   1. Left hip pain M25.552 719.45       Patient Active Problem List   Diagnosis   • Hypogonadism in male   • Essential hypertension   • Hypercholesterolemia   • Hypopituitarism (CMS/HCC)   • Central hypothyroidism   • Moderate persistent asthma without complication   • Gastroesophageal reflux disease without esophagitis   • Thyroid disease        Past Medical History:   Diagnosis Date   • Essential hypertension 8/3/2018   • Hypercholesterolemia 8/3/2018   • Hypogonadism in male 8/3/2018        No past surgical history on file.                    PT Assessment/Plan     Row Name 03/15/19 1700          PT Assessment    Assessment Comments  His left OI was very tender and he had immediate relief of pain and improved flexibility. Hopefully this will be key to pushing his progress forward more.  -TB        PT Plan    PT Plan Comments  Assess the guarding of his OI and continue with release and stretching if needed.  -TB       User Key  (r) = Recorded By, (t) = Taken By, (c) = Cosigned By    Initials Name Provider Type    TB Khalif Live, PT Physical Therapist              Exercises     Row Name 03/15/19 1700 03/15/19 1630          Subjective Comments    Subjective Comments  --  He says his left hip isn't really hurting, just tender. He hurt the last visit but the next day was good.   -TB        Subjective Pain    Pre-Treatment Pain Level  --  0  -TB        Total Minutes    23800 - PT Therapeutic Exercise Minutes  15  -TB  --     07551 - PT Manual Therapy Minutes  30  -TB  --        Exercise 1    Exercise Name 1  --  standing hip ext against green ttub  -TB     Reps 1  --  8  -TB     Time 1  --  10 sec hold tina  -TB        Exercise 2    Exercise Name 2  --  standing hip abd against green ttube  -TB      Reps 2  --  8  -TB     Time 2  --  10 sec each tina  -TB       User Key  (r) = Recorded By, (t) = Taken By, (c) = Cosigned By    Initials Name Provider Type    TB Khalif Live, PT Physical Therapist                        Manual Rx (last 36 hours)      Manual Treatments     Row Name 03/15/19 1700             Total Minutes    51528 - PT Manual Therapy Minutes  30  -TB         Manual Rx 1    Manual Rx 1 Location  left glute/piri  -TB      Manual Rx 1 Type  IASTM with hard vibrating roller  -TB      Manual Rx 1 Grade  also did deep tissue release  -TB      Manual Rx 1 Duration  15  -TB         Manual Rx 2    Manual Rx 2 Location  sidelying and hooklying left OI  -TB      Manual Rx 2 Type  TrP pressure sustained  -TB      Manual Rx 2 Grade  immediately after, had improved left hip flex/abd without pain  -TB      Manual Rx 2 Duration  5 min  -TB         Manual Rx 3    Manual Rx 3 Location  passive left piri/OI stretch   -TB      Manual Rx 3 Type  supine  -TB      Manual Rx 3 Duration  5  -TB        User Key  (r) = Recorded By, (t) = Taken By, (c) = Cosigned By    Initials Name Provider Type    Khalif Bhatti, PT Physical Therapist          PT OP Goals     Row Name 03/15/19 1700          Long Term Goals    LTG Date to Achieve  03/26/19  -TB     LTG 1  Improve tina hamstring SLR to 70 deg  -TB     LTG 1 Progress  Ongoing  -TB     LTG 2  Reports no pain at work except occasional minor twinges no more than 1-2/10  -TB     LTG 2 Progress  Ongoing  -TB     LTG 2 Progress Comments  1/10 today  -TB     LTG 3  Improve tina hip IR to 25 deg  -TB     LTG 3 Progress  Ongoing  -TB     LTG 4  Improve tina piriformis flexibility to WNL  -TB     LTG 4 Progress  Ongoing  -TB     LTG 5  Independent with HEP for flexibility  -TB     LTG 5 Progress  Ongoing  -TB        Time Calculation    PT Goal Re-Cert Due Date  04/12/19  -TB       User Key  (r) = Recorded By, (t) = Taken By, (c) = Cosigned By    Initials Name Provider Type    TB  Khalif Live, PT Physical Therapist          Therapy Education  Education Details: stretch to piri and OI  Given: HEP, Posture/body mechanics, Symptoms/condition management  Program: New  How Provided: Verbal, Demonstration  Provided to: Patient  Level of Understanding: Teach back education performed, Verbalized, Demonstrated              Time Calculation:   Start Time: 1635  Stop Time: 1720  Time Calculation (min): 45 min  Therapy Suggested Charges     Code   Minutes Charges    07609 (CPT®) Hc Pt Neuromusc Re Education Ea 15 Min      96827 (CPT®) Hc Pt Ther Proc Ea 15 Min 15 1    34276 (CPT®) Hc Gait Training Ea 15 Min      98109 (CPT®) Hc Pt Therapeutic Act Ea 15 Min      63729 (CPT®) Hc Pt Manual Therapy Ea 15 Min 30 2    43649 (CPT®) Hc Pt Ther Massage- Per 15 Min      60662 (CPT®) Hc Pt Iontophoresis Ea 15 Min      45267 (CPT®) Hc Pt Elec Stim Ea-Per 15 Min      00923 (CPT®) Hc Pt Ultrasound Ea 15 Min      28003 (CPT®) Hc Pt Self Care/Mgmt/Train Ea 15 Min      14427 (CPT®) Hc Pt Prosthetic (S) Train Initial Encounter, Each 15 Min      87163 (CPT®) Hc Orthotic(S) Mgmt/Train Initial Encounter, Each 15min      73959 (CPT®) Hc Pt Aquatic Therapy Ea 15 Min      57483 (CPT®) Hc Pt Orthotic(S)/Prosthetic(S) Encounter, Each 15 Min       (CPT®) Hc Pt Electrical Stim Unattended      Total  45 3        Therapy Charges for Today     Code Description Service Date Service Provider Modifiers Qty    41942216921 HC PT THER PROC EA 15 MIN 3/15/2019 Khalif Live, PT GP 1    48211943804 HC PT MANUAL THERAPY EA 15 MIN 3/15/2019 Khalif Live, PT GP 2                    Khalif Live, PT  3/15/2019

## 2019-03-19 ENCOUNTER — HOSPITAL ENCOUNTER (OUTPATIENT)
Dept: PHYSICAL THERAPY | Facility: HOSPITAL | Age: 44
Setting detail: THERAPIES SERIES
Discharge: HOME | End: 2019-03-19

## 2019-03-19 DIAGNOSIS — M25.552 LEFT HIP PAIN: Primary | ICD-10-CM

## 2019-03-19 PROCEDURE — 97140 MANUAL THERAPY 1/> REGIONS: CPT | Performed by: PHYSICAL THERAPIST

## 2019-03-19 PROCEDURE — 97110 THERAPEUTIC EXERCISES: CPT | Performed by: PHYSICAL THERAPIST

## 2019-03-19 NOTE — THERAPY TREATMENT NOTE
Outpatient Physical Therapy Ortho Treatment Note  [facility]     Patient Name: Carlos Mena  : 1975  MRN: [id number]  Today's Date: 3/19/2019      Visit Date: 2019    Visit Dx:    ICD-10-CM ICD-9-CM   1. Left hip pain M25.552 719.45       Patient Active Problem List   Diagnosis   • Hypogonadism in male   • Essential hypertension   • Hypercholesterolemia   • Hypopituitarism (CMS/HCC)   • Central hypothyroidism   • Moderate persistent asthma without complication   • Gastroesophageal reflux disease without esophagitis   • Thyroid disease        Past Medical History:   Diagnosis Date   • Essential hypertension 8/3/2018   • Hypercholesterolemia 8/3/2018   • Hypogonadism in male 8/3/2018        No past surgical history on file.                    PT Assessment/Plan     Row Name 19 1700          PT Assessment    Assessment Comments  He continues to do well. We did more strengthening today. His left hip IR is still limited but was going past neutral.  -TB        PT Plan    PT Plan Comments  cont emphasis on left hip mobility  -TB       User Key  (r) = Recorded By, (t) = Taken By, (c) = Cosigned By    Initials Name Provider Type    TB Khalif Live, PT Physical Therapist            Exercises     Row Name 19 1700 19 1550          Subjective Comments    Subjective Comments  --  He says he's been doing well since his last visit. His pain has been consistently lower.   -TB        Subjective Pain    Pre-Treatment Pain Level  --  1  -TB        Total Minutes    94743 - PT Therapeutic Exercise Minutes  15  -TB  --     96921 - PT Manual Therapy Minutes  35  -TB  --        Exercise 1    Exercise Name 1  --  standing hip ext against green ttub  -TB     Reps 1  --  6  -TB     Time 1  --  10 sec hold tina  -TB        Exercise 2    Exercise Name 2  --  seated hip abd/IR against green ttube  -TB     Reps 2  --  10  -TB     Time 2  --  10 sec each tina  -TB        Exercise 3    Exercise Name 3  --  squat  side stepping against green ttube  -TB     Reps 3  --  2 tina  -TB     Time 3  --  30 feet  -TB        Exercise 4    Exercise Name 4  --  squat on Vibraflex  -TB     Time 4  --  1 min  -TB     Additional Comments  --  27 hz  -TB       User Key  (r) = Recorded By, (t) = Taken By, (c) = Cosigned By    Initials Name Provider Type    TB Khalif Live, PT Physical Therapist                      Manual Rx (last 36 hours)      Manual Treatments     Row Name 03/19/19 1700             Total Minutes    09657 - PT Manual Therapy Minutes  35  -TB         Manual Rx 1    Manual Rx 1 Location  left glute/piri  -TB      Manual Rx 1 Grade   deep tissue release  -TB      Manual Rx 1 Duration  10  -TB         Manual Rx 2    Manual Rx 2 Location  sidelying and hooklying left OI  -TB      Manual Rx 2 Type  TrP pressure sustained  -TB      Manual Rx 2 Duration  10  -TB         Manual Rx 3    Manual Rx 3 Location  passive left piri/OI stretch   -TB      Manual Rx 3 Type  supine  -TB      Manual Rx 3 Grade  included internal rotation  -TB      Manual Rx 3 Duration  10 min left, 5 min right  -TB        User Key  (r) = Recorded By, (t) = Taken By, (c) = Cosigned By    Initials Name Provider Type    TB Khalif Live, PT Physical Therapist          PT OP Goals     Row Name 03/19/19 1700          Long Term Goals    LTG Date to Achieve  03/26/19  -TB     LTG 1  Improve tina hamstring SLR to 70 deg  -TB     LTG 1 Progress  Ongoing  -TB     LTG 2  Reports no pain at work except occasional minor twinges no more than 1-2/10  -TB     LTG 2 Progress  Ongoing  -TB     LTG 2 Progress Comments  doing consistently well at 1/10  -TB     LTG 3  Improve tina hip IR to 25 deg  -TB     LTG 3 Progress  Ongoing  -TB     LTG 4  Improve tina piriformis flexibility to WNL  -TB     LTG 4 Progress  Ongoing  -TB     LTG 5  Independent with HEP for flexibility  -TB     LTG 5 Progress  Ongoing  -TB        Time Calculation    PT Goal Re-Cert Due Date  04/12/19  -TB        User Key  (r) = Recorded By, (t) = Taken By, (c) = Cosigned By    Initials Name Provider Type    TB Khalif Live, PT Physical Therapist          Therapy Education  Given: HEP, Posture/body mechanics, Symptoms/condition management  Program: New  How Provided: Verbal, Demonstration  Provided to: Patient  Level of Understanding: Teach back education performed, Verbalized, Demonstrated              Time Calculation:   Start Time: 1550  Stop Time: 1640  Time Calculation (min): 50 min  Therapy Charges for Today     Code Description Service Date Service Provider Modifiers Qty    34274429139  PT THER PROC EA 15 MIN 3/19/2019 Khalif Live, PT GP 1    [id number]  PT MANUAL THERAPY EA 15 MIN 3/19/2019 Khalif Live, PT GP 2                    Khalif Live, PT  3/19/2019

## 2019-03-26 ENCOUNTER — APPOINTMENT (OUTPATIENT)
Dept: PHYSICAL THERAPY | Facility: HOSPITAL | Age: 44
End: 2019-03-26

## 2019-03-29 ENCOUNTER — APPOINTMENT (OUTPATIENT)
Dept: PHYSICAL THERAPY | Facility: HOSPITAL | Age: 44
End: 2019-03-29

## 2019-04-02 ENCOUNTER — APPOINTMENT (OUTPATIENT)
Dept: PHYSICAL THERAPY | Facility: HOSPITAL | Age: 44
End: 2019-04-02

## 2019-04-04 ENCOUNTER — APPOINTMENT (OUTPATIENT)
Dept: PHYSICAL THERAPY | Facility: HOSPITAL | Age: 44
End: 2019-04-04

## 2019-04-05 ENCOUNTER — APPOINTMENT (OUTPATIENT)
Dept: PHYSICAL THERAPY | Facility: HOSPITAL | Age: 44
End: 2019-04-05

## 2019-04-10 ENCOUNTER — HOSPITAL ENCOUNTER (OUTPATIENT)
Dept: PHYSICAL THERAPY | Facility: HOSPITAL | Age: 44
Setting detail: THERAPIES SERIES
Discharge: HOME | End: 2019-04-10

## 2019-04-10 DIAGNOSIS — M25.552 LEFT HIP PAIN: Primary | ICD-10-CM

## 2019-04-10 PROCEDURE — 97110 THERAPEUTIC EXERCISES: CPT | Performed by: PHYSICAL THERAPIST

## 2019-04-10 PROCEDURE — 97140 MANUAL THERAPY 1/> REGIONS: CPT | Performed by: PHYSICAL THERAPIST

## 2019-04-10 NOTE — THERAPY DISCHARGE NOTE
Outpatient Physical Therapy Ortho Treatment Note/Discharge Summary   [place]     Patient Name: Carlos Mena  : 1975  MRN: [id number]  Today's Date: 4/10/2019      Visit Date: 04/10/2019    Visit Dx:    ICD-10-CM ICD-9-CM   1. Left hip pain M25.552 719.45       Patient Active Problem List   Diagnosis   • Hypogonadism in male   • Essential hypertension   • Hypercholesterolemia   • Hypopituitarism (CMS/HCC)   • Central hypothyroidism   • Moderate persistent asthma without complication   • Gastroesophageal reflux disease without esophagitis   • Thyroid disease        Past Medical History:   Diagnosis Date   • Essential hypertension 8/3/2018   • Hypercholesterolemia 8/3/2018   • Hypogonadism in male 8/3/2018        No past surgical history on file.                    PT Assessment/Plan     Row Name 04/10/19 09          PT Assessment    Assessment Comments  He has met all his goals and is ready to be discharged.   -TB        PT Plan    PT Plan Comments  DC from PT to a HEP.  -TB       User Key  (r) = Recorded By, (t) = Taken By, (c) = Cosigned By    Initials Name Provider Type    TB Khalif Live, PT Physical Therapist              Exercises     Row Name 04/10/19 0900 04/10/19 0850 04/10/19 0800       Subjective Comments    Subjective Comments  --  He hasn't had any pain for about a week and a half. He's been consistently doing his stretches.   -TB  --  -TB       Subjective Pain    Pre-Treatment Pain Level  --  0  -TB  --  -TB       Total Minutes    95402 - PT Therapeutic Exercise Minutes  35  -TB  --  --    38469 - PT Manual Therapy Minutes  15  -TB  --  --       Exercise 1    Exercise Name 1  --  standing hip ext against green ttub  -TB  --    Reps 1  --  2 each leg  -TB  --    Time 1  --  30 sec hold tina  -TB  --       Exercise 2    Exercise Name 2  --  seated hip abd/IR and ER against green ttube  -TB  --    Reps 2  --  10  -TB  --    Time 2  --  10 sec each tina  -TB  --       Exercise 3     Exercise Name 3  --  squat side stepping against green ttube  -TB  --    Reps 3  --  2 tina  -TB  --    Time 3  --  30 feet  -TB  --       Exercise 4    Exercise Name 4  --  --  -TB  --    Time 4  --  --  -TB  --      User Key  (r) = Recorded By, (t) = Taken By, (c) = Cosigned By    Initials Name Provider Type    TB Khalif Live, PT Physical Therapist                        Manual Rx (last 36 hours)      Manual Treatments     Row Name 04/10/19 0900             Total Minutes    39803 - PT Manual Therapy Minutes  15  -TB         Manual Rx 3    Manual Rx 3 Location  passive left piri/OI stretch   -TB      Manual Rx 3 Type  supine  -TB      Manual Rx 3 Grade  included internal rotation and LAD  -TB      Manual Rx 3 Duration  15  -TB        User Key  (r) = Recorded By, (t) = Taken By, (c) = Cosigned By    Initials Name Provider Type    TB Khalif Live, PT Physical Therapist          PT OP Goals     Row Name 04/10/19 0900          Long Term Goals    LTG Date to Achieve  03/26/19  -TB     LTG 1  Improve tina hamstring SLR to 70 deg  -TB     LTG 1 Progress  Met  -TB     LTG 1 Progress Comments  tina 70 deg  -TB     LTG 2  Reports no pain at work except occasional minor twinges no more than 1-2/10  -TB     LTG 2 Progress  Met  -TB     LTG 2 Progress Comments  no pain for a week and a half  -TB     LTG 3  Improve tina hip IR to 25 deg  -TB     LTG 3 Progress  Met  -TB     LTG 3 Progress Comments  25 deg  -TB     LTG 4  Improve tina piriformis flexibility to WNL  -TB     LTG 4 Progress  Met  -TB     LTG 4 Progress Comments  improved greatly on piri flexibility  -TB     LTG 5  Independent with HEP for flexibility  -TB     LTG 5 Progress  Met  -TB     LTG 5 Progress Comments  focusing on stretchiing  -TB        Time Calculation    PT Goal Re-Cert Due Date  04/12/19  -TB       User Key  (r) = Recorded By, (t) = Taken By, (c) = Cosigned By    Initials Name Provider Type    Khalif Bhatti PT Physical Therapist           Therapy Education  Given: HEP, Posture/body mechanics, Symptoms/condition management  Program: Reinforced  How Provided: Verbal, Demonstration  Provided to: Patient  Level of Understanding: Teach back education performed, Verbalized, Demonstrated              Time Calculation:   Start Time: 0850  Stop Time: 0930  Time Calculation (min): 40 min  Total Timed Code Minutes- PT: 40 minute(s)  Therapy Charges for Today     Code Description Service Date Service Provider Modifiers Qty    09649038813 HC PT THER PROC EA 15 MIN 4/10/2019 Khalif Live, PT GP 2    85324682344 HC PT MANUAL THERAPY EA 15 MIN 4/10/2019 Khalif Live, PT GP 1                OP PT Discharge Summary  Date of Discharge: 04/10/19  Reason for Discharge: All goals achieved  Outcomes Achieved: Able to achieve all goals within established timeline  Discharge Destination: Home with home program  Discharge Instructions/Additional Comments: Emphasis was on flexibility of his left hip and then progressed to hip stabiltiy. His primary pain appeared to be an SI strain.       Khalif Live, PT  4/10/2019

## 2019-05-21 NOTE — PROGRESS NOTES
MICHELLE Hanna  [facility]   Res[facility] Disease Clinic  [address] [place] [address]  Phone: [phone]  Fax: [phone]     Carlos Mena is a 43 y.o. male.   CC:   Chief Complaint   Patient presents with   • F/U Asthma        HPI:Mr. Mena is coming in for a follow-up of his asthma.  He experiences mild, intermittent shortness of breath and cough.  It is improved with the use of Arnuity and Proventil when needed.  He is on Singulair at baseline.  It is somewhat complicated by his thyroid disease, obesity and reflux.  Last office visit he is not requiring his inhalers.  Indicates he continues to not require them.  It is been several months since he is required them.  He feels like his breathing is doing great.  Last office visit his son had just undergone a kidney transplant, given to him by his wife.  Indicates that both recovering and doing great    The following portions of the patient's history were reviewed and updated as appropriate: allergies, current medications, past family history, past medical history, past social history, past surgical history and problem list.    Past Medical History:   Diagnosis Date   • Essential hypertension 8/3/2018   • Hypercholesterolemia 8/3/2018   • Hypogonadism in male 8/3/2018       Family History   Problem Relation Age of Onset   • Diabetes Mother        Social History     Socioeconomic History   • Marital status:      Spouse name: Not on file   • Number of children: Not on file   • Years of education: Not on file   • Highest education level: Not on file   Tobacco Use   • Smoking status: Former Smoker     Years: 4.00     Types: Cigarettes     Last attempt to quit:      Years since quittin.3   • Smokeless tobacco: Never Used   Substance and Sexual Activity   • Alcohol use: Yes     Comment: social   • Drug use: No   • Sexual activity: Defer       Review of Systems   Constitutional: Negative for activity change, chills,  fatigue and fever.   HENT: Negative for congestion, postnasal drip, rhinorrhea, sinus pressure, sore throat and trouble swallowing.    Eyes: Negative for blurred vision, double vision and pain.   Respiratory: Negative for cough, chest tightness, shortness of breath and wheezing.    Cardiovascular: Negative for chest pain, palpitations and leg swelling.   Gastrointestinal: Negative for abdominal distention, constipation, diarrhea, nausea and vomiting.   Endocrine: Negative for polydipsia, polyphagia and polyuria.   Genitourinary: Negative for dysuria, frequency and urgency.   Musculoskeletal: Negative for arthralgias, back pain, gait problem and joint swelling.   Skin: Negative for color change, dry skin, rash and skin lesions.   Allergic/Immunologic: Negative for environmental allergies, food allergies and immunocompromised state.   Neurological: Negative for dizziness, seizures, speech difficulty, weakness, light-headedness, memory problem and confusion.   Hematological: Negative for adenopathy. Does not bruise/bleed easily.   Psychiatric/Behavioral: Negative for sleep disturbance, negative for hyperactivity and depressed mood. The patient is not nervous/anxious.        .vs    Physical Exam   Constitutional: He is oriented to person, place, and time. He appears well-developed and well-nourished. No distress.   HENT:   Head: Normocephalic and atraumatic.   Right Ear: External ear normal.   Left Ear: External ear normal.   Nose: Nose normal.   Mouth/Throat: Oropharynx is clear and moist. No oropharyngeal exudate.   Eyes: Conjunctivae and EOM are normal. Pupils are equal, round, and reactive to light. Right eye exhibits no discharge. Left eye exhibits no discharge.   Neck: Normal range of motion. Neck supple. No JVD present.   Cardiovascular: Normal rate and regular rhythm.   No murmur heard.  Pulmonary/Chest: Effort normal and breath sounds normal. No respiratory distress. He has no wheezes.   Abdominal: Soft. Bowel  sounds are normal. He exhibits no distension. There is no tenderness.   Musculoskeletal: Normal range of motion. He exhibits no edema or deformity.   Neurological: He is alert and oriented to person, place, and time. He displays normal reflexes. No cranial nerve deficit. Coordination normal.   Skin: Skin is warm and dry. No rash noted. He is not diaphoretic. No erythema.   Psychiatric: He has a normal mood and affect. His behavior is normal. Thought content normal.   Nursing note and vitals reviewed.      Pulmonary Functions Testing Results:    FEV1   Date Value Ref Range Status   05/22/2019 79% liters Final     FVC   Date Value Ref Range Status   05/22/2019 90% liters Final     FEV1/FVC   Date Value Ref Range Status   05/22/2019 72.20% % Final     My interpretation of his flow volume loop shows mild restrictive physiology with reduced mid flows, improved compared to previous    CXR: No imaging for this visit.        Problem List Items Addressed This Visit        Cardiovascular and Mediastinum    Essential hypertension       Respiratory    Moderate persistent asthma without complication - Primary    Relevant Orders    Pulmonary Function Test (Completed)       Digestive    Gastroesophageal reflux disease without esophagitis       Endocrine    Hypopituitarism (CMS/HCC)    Thyroid disease        Patient's Body mass index is 40.31 kg/m². BMI is above normal parameters. Recommendations include: educational material.    He is done great for almost an entire year without bronchodilators.  Spirometry is near normal.  We will go out to seeing him on an annual basis at this point for his request.  He will continue the Singulair.  He will contact us sooner than his follow-up if symptoms worsen before then.    Katya Ramos, MICHELLE  5/22/2019  11:52 AM    Return in about 1 year (around 5/22/2020) for FVL.

## 2019-05-22 ENCOUNTER — OFFICE VISIT (OUTPATIENT)
Dept: PULMONOLOGY | Facility: CLINIC | Age: 44
End: 2019-05-22

## 2019-05-22 VITALS
DIASTOLIC BLOOD PRESSURE: 80 MMHG | OXYGEN SATURATION: 99 % | HEART RATE: 87 BPM | HEIGHT: 71 IN | BODY MASS INDEX: 40.46 KG/M2 | WEIGHT: 289 LBS | SYSTOLIC BLOOD PRESSURE: 140 MMHG

## 2019-05-22 DIAGNOSIS — K21.9 GASTROESOPHAGEAL REFLUX DISEASE WITHOUT ESOPHAGITIS: ICD-10-CM

## 2019-05-22 DIAGNOSIS — E23.0 HYPOPITUITARISM (HCC): ICD-10-CM

## 2019-05-22 DIAGNOSIS — E07.9 THYROID DISEASE: ICD-10-CM

## 2019-05-22 DIAGNOSIS — I10 ESSENTIAL HYPERTENSION: ICD-10-CM

## 2019-05-22 DIAGNOSIS — J45.40 MODERATE PERSISTENT ASTHMA WITHOUT COMPLICATION: Primary | ICD-10-CM

## 2019-05-22 LAB
FEV1/FVC: NORMAL %
FEV1: NORMAL LITERS
FVC VOL RESPIRATORY: NORMAL LITERS

## 2019-05-22 PROCEDURE — 99214 OFFICE O/P EST MOD 30 MIN: CPT | Performed by: NURSE PRACTITIONER

## 2019-05-22 PROCEDURE — 94010 BREATHING CAPACITY TEST: CPT | Performed by: NURSE PRACTITIONER

## 2019-05-22 RX ORDER — ERGOCALCIFEROL 1.25 MG/1
50000 CAPSULE ORAL WEEKLY
COMMUNITY

## 2019-05-22 NOTE — PATIENT INSTRUCTIONS

## 2019-06-17 ENCOUNTER — OFFICE VISIT (OUTPATIENT)
Dept: ENDOCRINOLOGY | Facility: CLINIC | Age: 44
End: 2019-06-17

## 2019-06-17 ENCOUNTER — APPOINTMENT (OUTPATIENT)
Dept: LAB | Facility: HOSPITAL | Age: 44
End: 2019-06-17

## 2019-06-17 VITALS
OXYGEN SATURATION: 99 % | WEIGHT: 296.3 LBS | DIASTOLIC BLOOD PRESSURE: 80 MMHG | HEART RATE: 82 BPM | BODY MASS INDEX: 41.48 KG/M2 | SYSTOLIC BLOOD PRESSURE: 136 MMHG | HEIGHT: 71 IN

## 2019-06-17 DIAGNOSIS — E23.0 HYPOGONADOTROPIC HYPOGONADISM (HCC): ICD-10-CM

## 2019-06-17 DIAGNOSIS — E03.8 CENTRAL HYPOTHYROIDISM: Primary | ICD-10-CM

## 2019-06-17 DIAGNOSIS — R73.01 IMPAIRED FASTING GLUCOSE: ICD-10-CM

## 2019-06-17 DIAGNOSIS — E78.00 HYPERCHOLESTEROLEMIA: ICD-10-CM

## 2019-06-17 LAB
ALBUMIN SERPL-MCNC: 4.7 G/DL (ref 3.5–5.2)
ALBUMIN/GLOB SERPL: 1.5 G/DL
ALP SERPL-CCNC: 94 U/L (ref 39–117)
ALT SERPL W P-5'-P-CCNC: 84 U/L (ref 1–41)
ANION GAP SERPL CALCULATED.3IONS-SCNC: 12.8 MMOL/L
AST SERPL-CCNC: 55 U/L (ref 1–40)
BASOPHILS # BLD AUTO: 0.07 10*3/MM3 (ref 0–0.2)
BASOPHILS NFR BLD AUTO: 1 % (ref 0–1.5)
BILIRUB SERPL-MCNC: 0.4 MG/DL (ref 0.2–1.2)
BUN BLD-MCNC: 14 MG/DL (ref 6–20)
BUN/CREAT SERPL: 13.7 (ref 7–25)
CALCIUM SPEC-SCNC: 10 MG/DL (ref 8.6–10.5)
CHLORIDE SERPL-SCNC: 100 MMOL/L (ref 98–107)
CHOLEST SERPL-MCNC: 169 MG/DL (ref 0–200)
CO2 SERPL-SCNC: 26.2 MMOL/L (ref 22–29)
CREAT BLD-MCNC: 1.02 MG/DL (ref 0.76–1.27)
DEPRECATED RDW RBC AUTO: 43 FL (ref 37–54)
EOSINOPHIL # BLD AUTO: 0.24 10*3/MM3 (ref 0–0.4)
EOSINOPHIL NFR BLD AUTO: 3.5 % (ref 0.3–6.2)
ERYTHROCYTE [DISTWIDTH] IN BLOOD BY AUTOMATED COUNT: 12.7 % (ref 12.3–15.4)
GFR SERPL CREATININE-BSD FRML MDRD: 80 ML/MIN/1.73
GLOBULIN UR ELPH-MCNC: 3.1 GM/DL
GLUCOSE BLD-MCNC: 93 MG/DL (ref 65–99)
HBA1C MFR BLD: 5.3 % (ref 4.8–5.6)
HCT VFR BLD AUTO: 39.5 % (ref 37.5–51)
HDLC SERPL-MCNC: 34 MG/DL (ref 40–60)
HGB BLD-MCNC: 13.6 G/DL (ref 13–17.7)
IMM GRANULOCYTES # BLD AUTO: 0.06 10*3/MM3 (ref 0–0.05)
IMM GRANULOCYTES NFR BLD AUTO: 0.9 % (ref 0–0.5)
LDLC SERPL CALC-MCNC: 86 MG/DL (ref 0–100)
LDLC/HDLC SERPL: 2.52 {RATIO}
LYMPHOCYTES # BLD AUTO: 1.63 10*3/MM3 (ref 0.7–3.1)
LYMPHOCYTES NFR BLD AUTO: 24 % (ref 19.6–45.3)
MCH RBC QN AUTO: 32.1 PG (ref 26.6–33)
MCHC RBC AUTO-ENTMCNC: 34.4 G/DL (ref 31.5–35.7)
MCV RBC AUTO: 93.2 FL (ref 79–97)
MONOCYTES # BLD AUTO: 0.77 10*3/MM3 (ref 0.1–0.9)
MONOCYTES NFR BLD AUTO: 11.3 % (ref 5–12)
NEUTROPHILS # BLD AUTO: 4.03 10*3/MM3 (ref 1.7–7)
NEUTROPHILS NFR BLD AUTO: 59.3 % (ref 42.7–76)
NRBC BLD AUTO-RTO: 0 /100 WBC (ref 0–0.2)
PLATELET # BLD AUTO: 233 10*3/MM3 (ref 140–450)
PMV BLD AUTO: 11.3 FL (ref 6–12)
POTASSIUM BLD-SCNC: 4.6 MMOL/L (ref 3.5–5.2)
PROT SERPL-MCNC: 7.8 G/DL (ref 6–8.5)
RBC # BLD AUTO: 4.24 10*6/MM3 (ref 4.14–5.8)
SODIUM BLD-SCNC: 139 MMOL/L (ref 136–145)
T4 FREE SERPL-MCNC: 0.77 NG/DL (ref 0.93–1.7)
TRIGL SERPL-MCNC: 247 MG/DL (ref 0–150)
TSH SERPL DL<=0.05 MIU/L-ACNC: 0.97 MIU/ML (ref 0.27–4.2)
VLDLC SERPL-MCNC: 49.4 MG/DL (ref 5–40)
WBC NRBC COR # BLD: 6.8 10*3/MM3 (ref 3.4–10.8)

## 2019-06-17 PROCEDURE — 36415 COLL VENOUS BLD VENIPUNCTURE: CPT | Performed by: INTERNAL MEDICINE

## 2019-06-17 PROCEDURE — 84410 TESTOSTERONE BIOAVAILABLE: CPT | Performed by: INTERNAL MEDICINE

## 2019-06-17 PROCEDURE — 84443 ASSAY THYROID STIM HORMONE: CPT | Performed by: INTERNAL MEDICINE

## 2019-06-17 PROCEDURE — 80053 COMPREHEN METABOLIC PANEL: CPT | Performed by: INTERNAL MEDICINE

## 2019-06-17 PROCEDURE — 83036 HEMOGLOBIN GLYCOSYLATED A1C: CPT | Performed by: INTERNAL MEDICINE

## 2019-06-17 PROCEDURE — 99214 OFFICE O/P EST MOD 30 MIN: CPT | Performed by: INTERNAL MEDICINE

## 2019-06-17 PROCEDURE — 84305 ASSAY OF SOMATOMEDIN: CPT | Performed by: INTERNAL MEDICINE

## 2019-06-17 PROCEDURE — 84439 ASSAY OF FREE THYROXINE: CPT | Performed by: INTERNAL MEDICINE

## 2019-06-17 PROCEDURE — 85025 COMPLETE CBC W/AUTO DIFF WBC: CPT | Performed by: INTERNAL MEDICINE

## 2019-06-17 PROCEDURE — 80061 LIPID PANEL: CPT | Performed by: INTERNAL MEDICINE

## 2019-06-17 RX ORDER — LEVOTHYROXINE SODIUM 0.07 MG/1
75 TABLET ORAL DAILY
Qty: 30 TABLET | Refills: 11 | Status: SHIPPED | OUTPATIENT
Start: 2019-06-17 | End: 2019-10-17

## 2019-06-17 NOTE — PROGRESS NOTES
Carlos Mena is a 43 y.o. male who presents for  evaluation of   Chief Complaint   Patient presents with   • Hypothyroidism       Referring provider    No referring provider defined for this encounter.    Primary Care Provider    Judson Gonzalez MD    43-year-old male comes for follow-up.    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    I reevaluated testosterone off Clomid and total testosterone was low but bioavailable testosterone was normal ×2.    In our workup with the proved low free T4 confirmed by dialysis and he has been on levothyroxine.    Additional workup for pituitary axis included a cosyntropin stimulation test, measurement of IGF-I, prolactin and all other pituitary hormones are normal.    Additional testing revealed impaired fasting glucose    Past Medical History:   Diagnosis Date   • Essential hypertension 8/3/2018   • Hypercholesterolemia 8/3/2018   • Hypogonadism in male 8/3/2018     Family History   Problem Relation Age of Onset   • Diabetes Mother      Social History     Tobacco Use   • Smoking status: Former Smoker     Years: 4.00     Types: Cigarettes     Last attempt to quit:      Years since quittin.4   • Smokeless tobacco: Never Used   Substance Use Topics   • Alcohol use: Yes     Comment: social   • Drug use: No         Current Outpatient Medications:   •  atorvastatin (LIPITOR) 20 MG tablet, Take 20 mg by mouth Daily., Disp: , Rfl:   •  Blood Glucose Monitoring Suppl w/Device kit, USE AS INDICATED, ANY MONITOR , ICD10 code is E11.9, Disp: 1 each, Rfl: 1  •  CloNIDine (CATAPRES) 0.3 MG tablet, Take 0.3 mg by mouth 2 (Two) Times a Day., Disp: , Rfl:   •  esomeprazole (nexIUM) 40 MG capsule, Take 40 mg by mouth Every Morning Before Breakfast., Disp: , Rfl:   •  FLUoxetine (PROzac) 20 MG capsule, Take 40 mg by mouth  Daily., Disp: , Rfl:   •  Fluticasone Furoate 100 MCG/ACT aerosol powder , Inhale Daily., Disp: , Rfl:   •  Glucose Blood (BLOOD GLUCOSE TEST) strip, Use 4 x daily, use any brand covered by insurance or same brand as before , ICD10 code is E11.9, Disp: 120 each, Rfl: 11  •  Lancet Devices (LANCING DEVICE) misc, USE AS INDICATED TO CORRELATE WITH STRIPS AND METER, Disp: 1 each, Rfl: 1  •  Lancets 30G misc, USE 4 X DAILY, Disp: 120 each, Rfl: 11  •  levothyroxine (SYNTHROID) 50 MCG tablet, Take 1 tablet by mouth Daily., Disp: 30 tablet, Rfl: 11  •  losartan (COZAAR) 100 MG tablet, Take 100 mg by mouth Daily., Disp: , Rfl:   •  montelukast (SINGULAIR) 10 MG tablet, Take 10 mg by mouth Every Night., Disp: , Rfl:   •  Multiple Vitamins-Minerals (MULTIVITAMIN ADULT PO), Take 1 tablet by mouth Daily., Disp: , Rfl:   •  OXcarbazepine (TRILEPTAL) 150 MG tablet, Take 150 mg by mouth 2 (Two) Times a Day., Disp: , Rfl:   •  vitamin D (ERGOCALCIFEROL) 49935 units capsule capsule, Take 50,000 Units by mouth 1 (One) Time Per Week., Disp: , Rfl:     Review of Systems    Review of Systems   Constitutional: Positive for fatigue. Negative for activity change, appetite change, chills, diaphoresis, fever and unexpected weight change.   HENT: Negative for congestion, dental problem, drooling, ear discharge, ear pain, facial swelling, mouth sores, postnasal drip, rhinorrhea, sinus pressure, sore throat, tinnitus, trouble swallowing and voice change.    Eyes: Negative for photophobia, pain, discharge, redness, itching and visual disturbance.   Respiratory: Negative for apnea, cough, choking, chest tightness, shortness of breath, wheezing and stridor.    Cardiovascular: Negative for chest pain, palpitations and leg swelling.   Gastrointestinal: Negative for abdominal distention, abdominal pain, constipation, diarrhea, nausea and vomiting.   Endocrine: Negative for cold intolerance, heat intolerance, polydipsia, polyphagia and polyuria.  "  Genitourinary: Negative for decreased urine volume, difficulty urinating, dysuria, flank pain, frequency, hematuria and urgency.   Musculoskeletal: Negative for arthralgias, back pain, gait problem, joint swelling, myalgias, neck pain and neck stiffness.   Skin: Negative for color change, pallor, rash and wound.   Allergic/Immunologic: Negative for immunocompromised state.   Neurological: Positive for weakness. Negative for dizziness, tremors, seizures, syncope, facial asymmetry, speech difficulty, light-headedness, numbness and headaches.   Hematological: Negative for adenopathy.   Psychiatric/Behavioral: Negative for agitation, behavioral problems, confusion, decreased concentration, dysphoric mood, hallucinations, self-injury, sleep disturbance and suicidal ideas. The patient is not nervous/anxious and is not hyperactive.         Objective:   /80   Pulse 82   Ht 180.3 cm (71\")   Wt 134 kg (296 lb 4.8 oz)   SpO2 99%   BMI 41.33 kg/m²     Physical Exam   Constitutional: He is oriented to person, place, and time. He appears well-developed and well-nourished. He is cooperative.   HENT:   Head: Normocephalic and atraumatic.   Right Ear: External ear normal.   Left Ear: External ear normal.   Nose: Nose normal.   Mouth/Throat: Oropharynx is clear and moist. No oropharyngeal exudate.   Eyes: Conjunctivae and EOM are normal. Pupils are equal, round, and reactive to light. No scleral icterus. Right eye exhibits normal extraocular motion. Left eye exhibits normal extraocular motion.   Neck: Neck supple. No JVD present. No muscular tenderness present. No tracheal deviation, no edema and no erythema present. No thyromegaly present.   Cardiovascular: Normal rate, regular rhythm, normal heart sounds and intact distal pulses. Exam reveals no gallop and no friction rub.   No murmur heard.  Pulmonary/Chest: Effort normal and breath sounds normal. No stridor. No respiratory distress. He has no decreased breath sounds. " He has no wheezes. He has no rhonchi. He has no rales. He exhibits no tenderness.   Abdominal: Soft. Bowel sounds are normal. He exhibits no distension and no mass. There is no hepatomegaly. There is no tenderness. There is no rebound and no guarding. No hernia.   Musculoskeletal: Normal range of motion. He exhibits no edema, tenderness or deformity.   Lymphadenopathy:     He has no cervical adenopathy.   Neurological: He is alert and oriented to person, place, and time. He has normal reflexes. No cranial nerve deficit. He exhibits normal muscle tone. Coordination normal.   Skin: Skin is warm. No rash noted. No erythema. No pallor.   Psychiatric: He has a normal mood and affect. His behavior is normal. Judgment and thought content normal.   Nursing note and vitals reviewed.      Lab Review    Component      Latest Ref Rng & Units 6/17/2019 6/17/2019 6/17/2019 6/17/2019           9:22 AM  9:22 AM  9:22 AM  9:22 AM   WBC      3.40 - 10.80 10*3/mm3  6.80     RBC      4.14 - 5.80 10*6/mm3  4.24     Hemoglobin      13.0 - 17.7 g/dL  13.6     Hematocrit      37.5 - 51.0 %  39.5     MCV      79.0 - 97.0 fL  93.2     MCH      26.6 - 33.0 pg  32.1     MCHC      31.5 - 35.7 g/dL  34.4     RDW      12.3 - 15.4 %  12.7     RDW-SD      37.0 - 54.0 fl  43.0     MPV      6.0 - 12.0 fL  11.3     Platelets      140 - 450 10*3/mm3  233     Neutrophil Rel %      42.7 - 76.0 %  59.3     Lymphocyte Rel %      19.6 - 45.3 %  24.0     Monocyte Rel %      5.0 - 12.0 %  11.3     Eosinophil Rel %      0.3 - 6.2 %  3.5     Basophil Rel %      0.0 - 1.5 %  1.0     Immature Granulocyte Rel %      0.0 - 0.5 %  0.9 (H)     Neutrophils Absolute      1.70 - 7.00 10*3/mm3  4.03     Lymphocytes Absolute      0.70 - 3.10 10*3/mm3  1.63     Monocytes Absolute      0.10 - 0.90 10*3/mm3  0.77     Eosinophils Absolute      0.00 - 0.40 10*3/mm3  0.24     Basophils Absolute      0.00 - 0.20 10*3/mm3  0.07     Immature Grans, Absolute      0.00 - 0.05  10*3/mm3  0.06 (H)     nRBC      0.0 - 0.2 /100 WBC  0.0     Glucose      65 - 99 mg/dL   93    BUN      6 - 20 mg/dL   14    Creatinine      0.76 - 1.27 mg/dL   1.02    Sodium      136 - 145 mmol/L   139    Potassium      3.5 - 5.2 mmol/L   4.6    Chloride      98 - 107 mmol/L   100    CO2      22.0 - 29.0 mmol/L   26.2    Calcium      8.6 - 10.5 mg/dL   10.0    Total Protein      6.0 - 8.5 g/dL   7.8    Albumin      3.50 - 5.20 g/dL   4.70    ALT (SGPT)      1 - 41 U/L   84 (H)    AST (SGOT)      1 - 40 U/L   55 (H)    Alkaline Phosphatase      39 - 117 U/L   94    Total Bilirubin      0.2 - 1.2 mg/dL   0.4    eGFR Non African Am      >60 mL/min/1.73   80    Globulin      gm/dL   3.1    A/G Ratio      g/dL   1.5    BUN/Creatinine Ratio      7.0 - 25.0   13.7    Anion Gap      mmol/L   12.8    Total Cholesterol      0 - 200 mg/dL    169   Triglycerides      0 - 150 mg/dL    247 (H)   HDL Cholesterol      40 - 60 mg/dL    34 (L)   LDL Cholesterol       0 - 100 mg/dL    86   VLDL Cholesterol      5 - 40 mg/dL    49.4 (H)   LDL/HDL Ratio          2.52   Testosterone, Total      ng/dL       Testosterone, Bioavailable      ng/dL       Testosterone, % Bioavailable      %       Hemoglobin A1C      4.80 - 5.60 % 5.30      TSH Baseline      0.270 - 4.200 mIU/mL       Free T4      0.93 - 1.70 ng/dL       Insulin-Like Growth Factor-1      75 - 216 ng/mL         Component      Latest Ref Rng & Units 6/17/2019 6/17/2019 6/17/2019 6/17/2019           9:22 AM  9:22 AM  9:22 AM  9:22 AM   WBC      3.40 - 10.80 10*3/mm3       RBC      4.14 - 5.80 10*6/mm3       Hemoglobin      13.0 - 17.7 g/dL       Hematocrit      37.5 - 51.0 %       MCV      79.0 - 97.0 fL       MCH      26.6 - 33.0 pg       MCHC      31.5 - 35.7 g/dL       RDW      12.3 - 15.4 %       RDW-SD      37.0 - 54.0 fl       MPV      6.0 - 12.0 fL       Platelets      140 - 450 10*3/mm3       Neutrophil Rel %      42.7 - 76.0 %       Lymphocyte Rel %      19.6 - 45.3 %        Monocyte Rel %      5.0 - 12.0 %       Eosinophil Rel %      0.3 - 6.2 %       Basophil Rel %      0.0 - 1.5 %       Immature Granulocyte Rel %      0.0 - 0.5 %       Neutrophils Absolute      1.70 - 7.00 10*3/mm3       Lymphocytes Absolute      0.70 - 3.10 10*3/mm3       Monocytes Absolute      0.10 - 0.90 10*3/mm3       Eosinophils Absolute      0.00 - 0.40 10*3/mm3       Basophils Absolute      0.00 - 0.20 10*3/mm3       Immature Grans, Absolute      0.00 - 0.05 10*3/mm3       nRBC      0.0 - 0.2 /100 WBC       Glucose      65 - 99 mg/dL       BUN      6 - 20 mg/dL       Creatinine      0.76 - 1.27 mg/dL       Sodium      136 - 145 mmol/L       Potassium      3.5 - 5.2 mmol/L       Chloride      98 - 107 mmol/L       CO2      22.0 - 29.0 mmol/L       Calcium      8.6 - 10.5 mg/dL       Total Protein      6.0 - 8.5 g/dL       Albumin      3.50 - 5.20 g/dL       ALT (SGPT)      1 - 41 U/L       AST (SGOT)      1 - 40 U/L       Alkaline Phosphatase      39 - 117 U/L       Total Bilirubin      0.2 - 1.2 mg/dL       eGFR Non African Am      >60 mL/min/1.73       Globulin      gm/dL       A/G Ratio      g/dL       BUN/Creatinine Ratio      7.0 - 25.0       Anion Gap      mmol/L       Total Cholesterol      0 - 200 mg/dL       Triglycerides      0 - 150 mg/dL       HDL Cholesterol      40 - 60 mg/dL       LDL Cholesterol       0 - 100 mg/dL       VLDL Cholesterol      5 - 40 mg/dL       LDL/HDL Ratio             Testosterone, Total      ng/dL    318   Testosterone, Bioavailable      ng/dL    197   Testosterone, % Bioavailable      %    61.9   Hemoglobin A1C      4.80 - 5.60 %       TSH Baseline      0.270 - 4.200 mIU/mL 0.969      Free T4      0.93 - 1.70 ng/dL  0.77 (L)     Insulin-Like Growth Factor-1      75 - 216 ng/mL   179          Assessment/Plan       ICD-10-CM ICD-9-CM   1. 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 Hypercholesterolemia E78.00 272.0   3. 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 Impaired fasting glucose R73.01 790.21           History of Hypogonadism and was on clomiphene    We retested and bioavailable was normal x 2    Last in 2018      ===================    Central Hypothyroidism confirmed by Free T4 by dialysis with neg MRI     Lab Results   Component Value Date    TSH 1.640 12/05/2018    TSH 1.130 08/03/2018    TSH 2.520 12/06/2017       Lab Results   Component Value Date    FREET4 0.61 (L) 12/05/2018    FREET4 0.64 (L) 08/03/2018    FREET4 0.65 (L) 12/06/2017     LT4 at 25 , increased to 50     ----    2018     Nl IGF1  Nl prolactin    Nl  cosyntropin stim test     2018 - normal pituitary MRI    -----    Anemia    Do workup     Repeat nl with nl iron , b12, folic acid    ----    IFG    Defined threshold of what is normal and what requires tx       --    Using clonidine and losartan    Clonidine only at night , helps with profuse sweating      Orders Placed This Encounter   Procedures   • CBC Auto Differential   • Comprehensive Metabolic Panel   • Hemoglobin A1c   • Lipid Panel   • TSH   • T4, Free   • T4, Free   • Insulin-like Growth Factor   • Testosterone, Bioavailable (M)         A copy of my note was sent to Judson Gonzalez MD    Please see my above opinion and suggestions.       MDM

## 2019-06-19 LAB — IGF-I SERPL-MCNC: 179 NG/ML (ref 75–216)

## 2019-06-21 LAB
BIOAVAILABLE TESTOSTERONE, %: 61.9 %
BIOAVAILABLE TESTOSTERONE, S: 197 NG/DL
TESTOST SERPL-MCNC: 318 NG/DL

## 2019-10-01 ENCOUNTER — OFFICE VISIT (OUTPATIENT)
Dept: GASTROENTEROLOGY | Facility: CLINIC | Age: 44
End: 2019-10-01

## 2019-10-01 VITALS
WEIGHT: 292 LBS | DIASTOLIC BLOOD PRESSURE: 92 MMHG | SYSTOLIC BLOOD PRESSURE: 136 MMHG | OXYGEN SATURATION: 98 % | TEMPERATURE: 97.2 F | HEART RATE: 79 BPM | BODY MASS INDEX: 40.88 KG/M2 | HEIGHT: 71 IN

## 2019-10-01 DIAGNOSIS — K76.0 FATTY LIVER: ICD-10-CM

## 2019-10-01 DIAGNOSIS — I10 ESSENTIAL HYPERTENSION: ICD-10-CM

## 2019-10-01 DIAGNOSIS — Z80.0 FAMILY HX OF COLON CANCER: ICD-10-CM

## 2019-10-01 DIAGNOSIS — Z86.010 HISTORY OF ADENOMATOUS POLYP OF COLON: Primary | ICD-10-CM

## 2019-10-01 PROBLEM — Z86.0101 HISTORY OF ADENOMATOUS POLYP OF COLON: Status: ACTIVE | Noted: 2019-10-01

## 2019-10-01 PROCEDURE — 99213 OFFICE O/P EST LOW 20 MIN: CPT | Performed by: NURSE PRACTITIONER

## 2019-10-01 NOTE — PROGRESS NOTES
[facility]enterology    Primary Physician Judson Gonzalez MD    10/1/2019    Carlos Mena   1975      Chief Complaint   Patient presents with   • Colonoscopy   Fatty liver    Subjective     HPI    Carlos Mena is a 44 y.o. male who presents as a referral for preventative maintenance. He has no complaints of nausea or vomiting. No change in bowels. No wt loss. No BRBPR. No melena. No abdominal pain.        Last colonoscopy was 10/2014 noting a colon polyp ( path tubular adenomatous) and sigmoid diverticulosis.  The patient does  have history of colon polyps. The patient does not  have history of colon cancer.   There is no family history of colon polyps. There is family history of colon cancer paternal uncle.           He has history of fatty liver as well.  This was noted on ultrasound in 2012.  I did review his imaging here at [facility], [place] and he had a CT scan of the abdomen and pelvis in 2016 that also noted fatty liver.  States that his mother had White.  He is diabetic and has history of high cholesterol as well.  Denies drinking alcohol.    Past Medical History:   Diagnosis Date   • Arthritis    • Asthma    • Essential hypertension 8/3/2018   • Fatty liver    • GERD (gastroesophageal reflux disease)    • History of adenomatous polyp of colon    • Hypercholesterolemia 8/3/2018   • Hypogonadism in male 8/3/2018   • Peripheral neuropathy    • Thyroid disorder        Past Surgical History:   Procedure Laterality Date   • APPENDECTOMY     • COLONOSCOPY  10/30/2014    adenomatous polyp, diverticulosis   • ENDOSCOPY  10/30/2014    normal   • MOUTH SURGERY     • VASECTOMY         Outpatient Medications Marked as Taking for the 10/1/19 encounter (Office Visit) with Hailee Matt APRN   Medication Sig Dispense Refill   • atorvastatin (LIPITOR) 20 MG tablet Take 20 mg by mouth Daily.     • Blood Glucose Monitoring Suppl w/Device kit USE AS INDICATED, ANY MONITOR , ICD10 code is E11.9 1 each 1   •  CloNIDine (CATAPRES) 0.3 MG tablet Take 0.3 mg by mouth 2 (Two) Times a Day.     • Cyanocobalamin (VITAMIN B 12 PO) Take  by mouth Daily.     • esomeprazole (nexIUM) 40 MG capsule Take 40 mg by mouth Every Morning Before Breakfast.     • FLUoxetine (PROzac) 20 MG capsule Take 40 mg by mouth Daily.     • Glucose Blood (BLOOD GLUCOSE TEST) strip Use 4 x daily, use any brand covered by insurance or same brand as before , ICD10 code is E11.9 120 each 11   • Lancet Devices (LANCING DEVICE) misc USE AS INDICATED TO CORRELATE WITH STRIPS AND METER 1 each 1   • Lancets 30G misc USE 4 X DAILY 120 each 11   • levothyroxine (SYNTHROID) 75 MCG tablet Take 1 tablet by mouth Daily. 30 tablet 11   • losartan (COZAAR) 100 MG tablet Take 100 mg by mouth Daily.     • montelukast (SINGULAIR) 10 MG tablet Take 10 mg by mouth Every Night.     • Multiple Vitamins-Minerals (MULTIVITAMIN ADULT PO) Take 1 tablet by mouth Daily.     • OXcarbazepine (TRILEPTAL) 150 MG tablet Take 150 mg by mouth 2 (Two) Times a Day.     • vitamin D (ERGOCALCIFEROL) 15602 units capsule capsule Take 50,000 Units by mouth 1 (One) Time Per Week.         No Known Allergies    Social History     Socioeconomic History   • Marital status:      Spouse name: Not on file   • Number of children: Not on file   • Years of education: Not on file   • Highest education level: Not on file   Tobacco Use   • Smoking status: Former Smoker     Years: 4.00     Types: Cigarettes     Last attempt to quit:      Years since quittin.7   • Smokeless tobacco: Never Used   Substance and Sexual Activity   • Alcohol use: Yes     Comment: social   • Drug use: No   • Sexual activity: Defer       Family History   Problem Relation Age of Onset   • Diabetes Mother    • Colon cancer Paternal Uncle        Review of Systems   Constitutional: Negative for appetite change, chills, fatigue, fever and unexpected weight change.   HENT: Negative for sore throat and trouble swallowing.     Eyes: Negative for visual disturbance.   Respiratory: Negative for cough, shortness of breath and wheezing.    Cardiovascular: Negative for chest pain and palpitations.   Gastrointestinal: Negative for abdominal distention, abdominal pain, anal bleeding, blood in stool, constipation, diarrhea, nausea and vomiting.        As mentioned in hpi   Genitourinary: Negative for difficulty urinating and hematuria.   Musculoskeletal: Negative for arthralgias and back pain.   Skin: Negative for color change and rash.   Neurological: Negative for dizziness, seizures, syncope, light-headedness and headaches.   Hematological: Negative for adenopathy.   Psychiatric/Behavioral: Negative for confusion. The patient is not nervous/anxious.        Objective     Vitals:    10/01/19 1458   BP: 136/92   Pulse: 79   Temp: 97.2 °F (36.2 °C)   SpO2: 98%         10/01/19  1458   Weight: 132 kg (292 lb)     Body mass index is 40.73 kg/m².    Physical Exam   Constitutional: He appears well-developed and well-nourished. No distress.   HENT:   Head: Normocephalic and atraumatic.   Eyes: EOM are normal. No scleral icterus.   Neck: Neck supple. No JVD present.   Cardiovascular: Normal rate, regular rhythm and normal heart sounds.   Pulmonary/Chest: Effort normal and breath sounds normal.   Abdominal: Soft. Bowel sounds are normal. He exhibits no distension. There is no tenderness.   Musculoskeletal: Normal range of motion. He exhibits no deformity.   Neurological: He is alert.   Skin: Skin is warm and dry. No rash noted.   Psychiatric: He has a normal mood and affect. His behavior is normal.   Vitals reviewed.      Imaging Results (most recent)     None          Assessment/Plan     Carlos was seen today for colonoscopy.    Diagnoses and all orders for this visit:    History of adenomatous polyp of colon  -     Case Request; Standing  -     Case Request    Family hx of colon cancer  -     Case Request; Standing  -     Case Request    Essential  hypertension    Fatty liver    Other orders  -     Follow Anesthesia Guidelines / Standing Orders; Future  -     Implement Anesthesia Orders Day of Procedure; Standing  -     Obtain Informed Consent; Standing  -     Obtain Informed Consent; Future  -     polyethylene glycol (GOLYTELY) 236 g solution; Take 4,000 mL by mouth 1 (One) Time for 1 dose. Take as directed per instruction sheet.       Plan for colonoscopy. The patient was advised to take any blood pressure or heart  medications the morning of  procedure if that is when he/she normally takes.          He has history of fatty liver as well.  This was noted on imaging study in 2012.  I did discuss with him pursuing an ultrasound elastography and explained to him what this information can give us.  He verbalized understanding but just not willing to schedule that test at this time.  States that he will call us if he changes his mind and may even talk to his PCP about the test as well.I discussed how fatty liver can lead to cirrhosis, disability and pre-mature death.  How it also can be a sign of increased risk for cardiovascular disease.  I discussed the importance of getting rid of fat in the liver by controlling lipids and glucose, avoiding etoh helps, and gradual weight loss to ideal body weight is very important.  Follow-up here as needed.         Body mass index is 40.73 kg/m².    Patient's Body mass index is 40.73 kg/m². BMI is above normal parameters. Recommendations include: no follow up ,recommend weight loss .      COLONOSCOPY WITH ANESTHESIA (N/A)  All risks, benefits, alternatives, and indications of colonoscopy procedure have been discussed with the patient. Risks to include perforation of the colon requiring possible surgery or colostomy, risk of bleeding from biopsies or removal of colon tissue, possibility of missing a colon polyp or cancer, or adverse drug reaction.  Benefits to include the diagnosis and management of disease of the colon and  rectum. Alternatives to include barium enema, radiographic evaluation, lab testing or no intervention. Pt verbalizes understanding and agrees.         MICHELLE Rosenbaum      EMR Dragon/transcription disclaimer:  Much of this encounter note is electronic transcription/translation of spoken language to printed text.  The electronic translation of spoken language may be erroneous, or at times, nonsensical words or phrases may be inadvertently transcribed.  Although I have reviewed the note for such errors, some may still exist.

## 2019-10-14 ENCOUNTER — LAB (OUTPATIENT)
Dept: LAB | Facility: HOSPITAL | Age: 44
End: 2019-10-14

## 2019-10-14 ENCOUNTER — OFFICE VISIT (OUTPATIENT)
Dept: ENDOCRINOLOGY | Facility: CLINIC | Age: 44
End: 2019-10-14

## 2019-10-14 VITALS
DIASTOLIC BLOOD PRESSURE: 80 MMHG | HEART RATE: 87 BPM | WEIGHT: 293.3 LBS | OXYGEN SATURATION: 98 % | HEIGHT: 71 IN | BODY MASS INDEX: 41.06 KG/M2 | SYSTOLIC BLOOD PRESSURE: 128 MMHG

## 2019-10-14 DIAGNOSIS — E78.5 DYSLIPIDEMIA: ICD-10-CM

## 2019-10-14 DIAGNOSIS — R73.01 IMPAIRED FASTING GLUCOSE: ICD-10-CM

## 2019-10-14 DIAGNOSIS — E23.0 HYPOGONADOTROPIC HYPOGONADISM (HCC): ICD-10-CM

## 2019-10-14 DIAGNOSIS — E03.8 CENTRAL HYPOTHYROIDISM: ICD-10-CM

## 2019-10-14 DIAGNOSIS — E66.01 CLASS 3 SEVERE OBESITY DUE TO EXCESS CALORIES WITH SERIOUS COMORBIDITY AND BODY MASS INDEX (BMI) OF 40.0 TO 44.9 IN ADULT (HCC): ICD-10-CM

## 2019-10-14 DIAGNOSIS — E03.8 CENTRAL HYPOTHYROIDISM: Primary | ICD-10-CM

## 2019-10-14 LAB
ALBUMIN SERPL-MCNC: 4.9 G/DL (ref 3.5–5.2)
ALBUMIN/GLOB SERPL: 1.4 G/DL
ALP SERPL-CCNC: 99 U/L (ref 39–117)
ALT SERPL W P-5'-P-CCNC: 88 U/L (ref 1–41)
ANION GAP SERPL CALCULATED.3IONS-SCNC: 10.7 MMOL/L (ref 5–15)
AST SERPL-CCNC: 74 U/L (ref 1–40)
BASOPHILS # BLD AUTO: 0.08 10*3/MM3 (ref 0–0.2)
BASOPHILS NFR BLD AUTO: 1.2 % (ref 0–1.5)
BILIRUB SERPL-MCNC: 0.4 MG/DL (ref 0.2–1.2)
BUN BLD-MCNC: 11 MG/DL (ref 6–20)
BUN/CREAT SERPL: 11.1 (ref 7–25)
CALCIUM SPEC-SCNC: 9.5 MG/DL (ref 8.6–10.5)
CHLORIDE SERPL-SCNC: 97 MMOL/L (ref 98–107)
CHOLEST SERPL-MCNC: 176 MG/DL (ref 0–200)
CO2 SERPL-SCNC: 28.3 MMOL/L (ref 22–29)
CREAT BLD-MCNC: 0.99 MG/DL (ref 0.76–1.27)
DEPRECATED RDW RBC AUTO: 45.6 FL (ref 37–54)
EOSINOPHIL # BLD AUTO: 0.28 10*3/MM3 (ref 0–0.4)
EOSINOPHIL NFR BLD AUTO: 4.3 % (ref 0.3–6.2)
ERYTHROCYTE [DISTWIDTH] IN BLOOD BY AUTOMATED COUNT: 13.2 % (ref 12.3–15.4)
GFR SERPL CREATININE-BSD FRML MDRD: 82 ML/MIN/1.73
GLOBULIN UR ELPH-MCNC: 3.4 GM/DL
GLUCOSE BLD-MCNC: 96 MG/DL (ref 65–99)
HBA1C MFR BLD: 5.9 % (ref 4.8–5.6)
HCT VFR BLD AUTO: 39.6 % (ref 37.5–51)
HDLC SERPL-MCNC: 34 MG/DL (ref 40–60)
HGB BLD-MCNC: 13.7 G/DL (ref 13–17.7)
IMM GRANULOCYTES # BLD AUTO: 0.04 10*3/MM3 (ref 0–0.05)
IMM GRANULOCYTES NFR BLD AUTO: 0.6 % (ref 0–0.5)
LDLC SERPL CALC-MCNC: 96 MG/DL (ref 0–100)
LDLC/HDLC SERPL: 2.82 {RATIO}
LYMPHOCYTES # BLD AUTO: 1.68 10*3/MM3 (ref 0.7–3.1)
LYMPHOCYTES NFR BLD AUTO: 25.9 % (ref 19.6–45.3)
MCH RBC QN AUTO: 32.8 PG (ref 26.6–33)
MCHC RBC AUTO-ENTMCNC: 34.6 G/DL (ref 31.5–35.7)
MCV RBC AUTO: 94.7 FL (ref 79–97)
MONOCYTES # BLD AUTO: 0.69 10*3/MM3 (ref 0.1–0.9)
MONOCYTES NFR BLD AUTO: 10.6 % (ref 5–12)
NEUTROPHILS # BLD AUTO: 3.71 10*3/MM3 (ref 1.7–7)
NEUTROPHILS NFR BLD AUTO: 57.4 % (ref 42.7–76)
NRBC BLD AUTO-RTO: 0 /100 WBC (ref 0–0.2)
PLATELET # BLD AUTO: 260 10*3/MM3 (ref 140–450)
PMV BLD AUTO: 11 FL (ref 6–12)
POTASSIUM BLD-SCNC: 4 MMOL/L (ref 3.5–5.2)
PROT SERPL-MCNC: 8.3 G/DL (ref 6–8.5)
RBC # BLD AUTO: 4.18 10*6/MM3 (ref 4.14–5.8)
SODIUM BLD-SCNC: 136 MMOL/L (ref 136–145)
T4 FREE SERPL-MCNC: 0.83 NG/DL (ref 0.93–1.7)
TESTOST SERPL-MCNC: 267 NG/DL (ref 249–836)
TRIGL SERPL-MCNC: 231 MG/DL (ref 0–150)
TSH SERPL DL<=0.05 MIU/L-ACNC: 1.11 UIU/ML (ref 0.27–4.2)
VLDLC SERPL-MCNC: 46.2 MG/DL (ref 5–40)
WBC NRBC COR # BLD: 6.48 10*3/MM3 (ref 3.4–10.8)

## 2019-10-14 PROCEDURE — 80061 LIPID PANEL: CPT

## 2019-10-14 PROCEDURE — 84439 ASSAY OF FREE THYROXINE: CPT

## 2019-10-14 PROCEDURE — 36415 COLL VENOUS BLD VENIPUNCTURE: CPT

## 2019-10-14 PROCEDURE — 84270 ASSAY OF SEX HORMONE GLOBUL: CPT

## 2019-10-14 PROCEDURE — 80053 COMPREHEN METABOLIC PANEL: CPT

## 2019-10-14 PROCEDURE — 83036 HEMOGLOBIN GLYCOSYLATED A1C: CPT

## 2019-10-14 PROCEDURE — 85025 COMPLETE CBC W/AUTO DIFF WBC: CPT

## 2019-10-14 PROCEDURE — 99214 OFFICE O/P EST MOD 30 MIN: CPT | Performed by: INTERNAL MEDICINE

## 2019-10-14 PROCEDURE — 84443 ASSAY THYROID STIM HORMONE: CPT

## 2019-10-14 PROCEDURE — 84403 ASSAY OF TOTAL TESTOSTERONE: CPT

## 2019-10-14 NOTE — PROGRESS NOTES
Carlos Mena is a 44 y.o. male who presents for  evaluation of   Chief Complaint   Patient presents with   • Hypothyroidism       Referring provider   Primary Care Provider    Judson Gonzalez MD    43-year-old male comes for follow-up.    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    I reevaluated testosterone off Clomid and total testosterone was low but bioavailable testosterone was normal ×2.  Last assessment in 10-19 is normal as well    In our workup with the proved low free T4 confirmed by dialysis and he has been on levothyroxine.    Additional workup for pituitary axis included a cosyntropin stimulation test, measurement of IGF-I, prolactin and all other pituitary hormones are normal.    Additional testing revealed impaired fasting glucose    Past Medical History:   Diagnosis Date   • Arthritis    • Asthma    • Essential hypertension 8/3/2018   • Fatty liver    • GERD (gastroesophageal reflux disease)    • History of adenomatous polyp of colon    • Hypercholesterolemia 8/3/2018   • Hypogonadism in male 8/3/2018   • Peripheral neuropathy    • Thyroid disorder      Family History   Problem Relation Age of Onset   • Diabetes Mother    • Colon cancer Paternal Uncle      Social History     Tobacco Use   • Smoking status: Former Smoker     Years: 4.00     Types: Cigarettes     Last attempt to quit:      Years since quittin.8   • Smokeless tobacco: Never Used   Substance Use Topics   • Alcohol use: Yes     Comment: social   • Drug use: No         Current Outpatient Medications:   •  atorvastatin (LIPITOR) 20 MG tablet, Take 20 mg by mouth Daily., Disp: , Rfl:   •  Blood Glucose Monitoring Suppl w/Device kit, USE AS INDICATED, ANY MONITOR , ICD10 code is E11.9, Disp: 1 each, Rfl: 1  •  CloNIDine (CATAPRES) 0.3 MG tablet, Take 0.3 mg by mouth 2  (Two) Times a Day., Disp: , Rfl:   •  Cyanocobalamin (VITAMIN B 12 PO), Take  by mouth Daily., Disp: , Rfl:   •  esomeprazole (nexIUM) 40 MG capsule, Take 40 mg by mouth Every Morning Before Breakfast., Disp: , Rfl:   •  FLUoxetine (PROzac) 20 MG capsule, Take 40 mg by mouth Daily., Disp: , Rfl:   •  Fluticasone Furoate 100 MCG/ACT aerosol powder , Inhale Daily., Disp: , Rfl:   •  Glucose Blood (BLOOD GLUCOSE TEST) strip, Use 4 x daily, use any brand covered by insurance or same brand as before , ICD10 code is E11.9, Disp: 120 each, Rfl: 11  •  Lancet Devices (LANCING DEVICE) misc, USE AS INDICATED TO CORRELATE WITH STRIPS AND METER, Disp: 1 each, Rfl: 1  •  Lancets 30G misc, USE 4 X DAILY, Disp: 120 each, Rfl: 11  •  levothyroxine (SYNTHROID) 75 MCG tablet, Take 1 tablet by mouth Daily., Disp: 30 tablet, Rfl: 11  •  losartan (COZAAR) 100 MG tablet, Take 100 mg by mouth Daily., Disp: , Rfl:   •  montelukast (SINGULAIR) 10 MG tablet, Take 10 mg by mouth Every Night., Disp: , Rfl:   •  Multiple Vitamins-Minerals (MULTIVITAMIN ADULT PO), Take 1 tablet by mouth Daily., Disp: , Rfl:   •  OXcarbazepine (TRILEPTAL) 150 MG tablet, Take 150 mg by mouth 2 (Two) Times a Day., Disp: , Rfl:   •  vitamin D (ERGOCALCIFEROL) 57715 units capsule capsule, Take 50,000 Units by mouth 1 (One) Time Per Week., Disp: , Rfl:   •  Insulin Pen Needle (B-D UF III MINI PEN NEEDLES) 31G X 5 MM misc, Use 4 times daily  , ICD10 code is E11.9, Disp: 50 each, Rfl: 11  •  Liraglutide -Weight Management (SAXENDA) 18 MG/3ML solution pen-injector, 3 mg daily, Disp: 2 pen, Rfl: 5  •  Lorcaserin HCl ER (BELVIQ XR) 20 MG tablet sustained-release 24 hour, Take 1 tablet/day by mouth Daily., Disp: 30 tablet, Rfl: 5    Review of Systems    Review of Systems   Constitutional: Positive for fatigue. Negative for activity change, appetite change, chills, diaphoresis, fever and unexpected weight change.   HENT: Negative for congestion, dental problem, drooling, ear  "discharge, ear pain, facial swelling, mouth sores, postnasal drip, rhinorrhea, sinus pressure, sore throat, tinnitus, trouble swallowing and voice change.    Eyes: Negative for photophobia, pain, discharge, redness, itching and visual disturbance.   Respiratory: Negative for apnea, cough, choking, chest tightness, shortness of breath, wheezing and stridor.    Cardiovascular: Negative for chest pain, palpitations and leg swelling.   Gastrointestinal: Negative for abdominal distention, abdominal pain, constipation, diarrhea, nausea and vomiting.   Endocrine: Negative for cold intolerance, heat intolerance, polydipsia, polyphagia and polyuria.   Genitourinary: Negative for decreased urine volume, difficulty urinating, dysuria, flank pain, frequency, hematuria and urgency.   Musculoskeletal: Negative for arthralgias, back pain, gait problem, joint swelling, myalgias, neck pain and neck stiffness.   Skin: Negative for color change, pallor, rash and wound.   Allergic/Immunologic: Negative for immunocompromised state.   Neurological: Positive for weakness. Negative for dizziness, tremors, seizures, syncope, facial asymmetry, speech difficulty, light-headedness, numbness and headaches.   Hematological: Negative for adenopathy.   Psychiatric/Behavioral: Negative for agitation, behavioral problems, confusion, decreased concentration, dysphoric mood, hallucinations, self-injury, sleep disturbance and suicidal ideas. The patient is not nervous/anxious and is not hyperactive.         Objective:   /80   Pulse 87   Ht 180.3 cm (71\")   Wt 133 kg (293 lb 4.8 oz)   SpO2 98%   BMI 40.91 kg/m²     Physical Exam   Constitutional: He is oriented to person, place, and time. He appears well-developed and well-nourished. He is cooperative.   HENT:   Head: Normocephalic and atraumatic.   Right Ear: External ear normal.   Left Ear: External ear normal.   Nose: Nose normal.   Mouth/Throat: Oropharynx is clear and moist. No " oropharyngeal exudate.   Eyes: Conjunctivae and EOM are normal. Pupils are equal, round, and reactive to light. No scleral icterus. Right eye exhibits normal extraocular motion. Left eye exhibits normal extraocular motion.   Neck: Neck supple. No JVD present. No muscular tenderness present. No tracheal deviation, no edema and no erythema present. No thyromegaly present.   Cardiovascular: Normal rate, regular rhythm, normal heart sounds and intact distal pulses. Exam reveals no gallop and no friction rub.   No murmur heard.  Pulmonary/Chest: Effort normal and breath sounds normal. No stridor. No respiratory distress. He has no decreased breath sounds. He has no wheezes. He has no rhonchi. He has no rales. He exhibits no tenderness.   Abdominal: Soft. Bowel sounds are normal. He exhibits no distension and no mass. There is no hepatomegaly. There is no tenderness. There is no rebound and no guarding. No hernia.   Musculoskeletal: Normal range of motion. He exhibits no edema, tenderness or deformity.   Lymphadenopathy:     He has no cervical adenopathy.   Neurological: He is alert and oriented to person, place, and time. He has normal reflexes. No cranial nerve deficit. He exhibits normal muscle tone. Coordination normal.   Skin: Skin is warm. No rash noted. No erythema. No pallor.   Psychiatric: He has a normal mood and affect. His behavior is normal. Judgment and thought content normal.   Nursing note and vitals reviewed.      Lab Review    Component      Latest Ref Rng & Units 6/17/2019 6/17/2019 6/17/2019 6/17/2019           9:22 AM  9:22 AM  9:22 AM  9:22 AM   WBC      3.40 - 10.80 10*3/mm3  6.80     RBC      4.14 - 5.80 10*6/mm3  4.24     Hemoglobin      13.0 - 17.7 g/dL  13.6     Hematocrit      37.5 - 51.0 %  39.5     MCV      79.0 - 97.0 fL  93.2     MCH      26.6 - 33.0 pg  32.1     MCHC      31.5 - 35.7 g/dL  34.4     RDW      12.3 - 15.4 %  12.7     RDW-SD      37.0 - 54.0 fl  43.0     MPV      6.0 - 12.0 fL   11.3     Platelets      140 - 450 10*3/mm3  233     Neutrophil Rel %      42.7 - 76.0 %  59.3     Lymphocyte Rel %      19.6 - 45.3 %  24.0     Monocyte Rel %      5.0 - 12.0 %  11.3     Eosinophil Rel %      0.3 - 6.2 %  3.5     Basophil Rel %      0.0 - 1.5 %  1.0     Immature Granulocyte Rel %      0.0 - 0.5 %  0.9 (H)     Neutrophils Absolute      1.70 - 7.00 10*3/mm3  4.03     Lymphocytes Absolute      0.70 - 3.10 10*3/mm3  1.63     Monocytes Absolute      0.10 - 0.90 10*3/mm3  0.77     Eosinophils Absolute      0.00 - 0.40 10*3/mm3  0.24     Basophils Absolute      0.00 - 0.20 10*3/mm3  0.07     Immature Grans, Absolute      0.00 - 0.05 10*3/mm3  0.06 (H)     nRBC      0.0 - 0.2 /100 WBC  0.0     Glucose      65 - 99 mg/dL   93    BUN      6 - 20 mg/dL   14    Creatinine      0.76 - 1.27 mg/dL   1.02    Sodium      136 - 145 mmol/L   139    Potassium      3.5 - 5.2 mmol/L   4.6    Chloride      98 - 107 mmol/L   100    CO2      22.0 - 29.0 mmol/L   26.2    Calcium      8.6 - 10.5 mg/dL   10.0    Total Protein      6.0 - 8.5 g/dL   7.8    Albumin      3.50 - 5.20 g/dL   4.70    ALT (SGPT)      1 - 41 U/L   84 (H)    AST (SGOT)      1 - 40 U/L   55 (H)    Alkaline Phosphatase      39 - 117 U/L   94    Total Bilirubin      0.2 - 1.2 mg/dL   0.4    eGFR Non African Am      >60 mL/min/1.73   80    Globulin      gm/dL   3.1    A/G Ratio      g/dL   1.5    BUN/Creatinine Ratio      7.0 - 25.0   13.7    Anion Gap      mmol/L   12.8    Total Cholesterol      0 - 200 mg/dL    169   Triglycerides      0 - 150 mg/dL    247 (H)   HDL Cholesterol      40 - 60 mg/dL    34 (L)   LDL Cholesterol       0 - 100 mg/dL    86   VLDL Cholesterol      5 - 40 mg/dL    49.4 (H)   LDL/HDL Ratio          2.52   Testosterone, Total      ng/dL       Testosterone, Bioavailable      ng/dL       Testosterone, % Bioavailable      %       Hemoglobin A1C      4.80 - 5.60 % 5.30      TSH Baseline      0.270 - 4.200 mIU/mL       Free T4      0.93  - 1.70 ng/dL       Insulin-Like Growth Factor-1      75 - 216 ng/mL         Component      Latest Ref Rng & Units 6/17/2019 6/17/2019 6/17/2019 6/17/2019           9:22 AM  9:22 AM  9:22 AM  9:22 AM   WBC      3.40 - 10.80 10*3/mm3       RBC      4.14 - 5.80 10*6/mm3       Hemoglobin      13.0 - 17.7 g/dL       Hematocrit      37.5 - 51.0 %       MCV      79.0 - 97.0 fL       MCH      26.6 - 33.0 pg       MCHC      31.5 - 35.7 g/dL       RDW      12.3 - 15.4 %       RDW-SD      37.0 - 54.0 fl       MPV      6.0 - 12.0 fL       Platelets      140 - 450 10*3/mm3       Neutrophil Rel %      42.7 - 76.0 %       Lymphocyte Rel %      19.6 - 45.3 %       Monocyte Rel %      5.0 - 12.0 %       Eosinophil Rel %      0.3 - 6.2 %       Basophil Rel %      0.0 - 1.5 %       Immature Granulocyte Rel %      0.0 - 0.5 %       Neutrophils Absolute      1.70 - 7.00 10*3/mm3       Lymphocytes Absolute      0.70 - 3.10 10*3/mm3       Monocytes Absolute      0.10 - 0.90 10*3/mm3       Eosinophils Absolute      0.00 - 0.40 10*3/mm3       Basophils Absolute      0.00 - 0.20 10*3/mm3       Immature Grans, Absolute      0.00 - 0.05 10*3/mm3       nRBC      0.0 - 0.2 /100 WBC       Glucose      65 - 99 mg/dL       BUN      6 - 20 mg/dL       Creatinine      0.76 - 1.27 mg/dL       Sodium      136 - 145 mmol/L       Potassium      3.5 - 5.2 mmol/L       Chloride      98 - 107 mmol/L       CO2      22.0 - 29.0 mmol/L       Calcium      8.6 - 10.5 mg/dL       Total Protein      6.0 - 8.5 g/dL       Albumin      3.50 - 5.20 g/dL       ALT (SGPT)      1 - 41 U/L       AST (SGOT)      1 - 40 U/L       Alkaline Phosphatase      39 - 117 U/L       Total Bilirubin      0.2 - 1.2 mg/dL       eGFR Non African Am      >60 mL/min/1.73       Globulin      gm/dL       A/G Ratio      g/dL       BUN/Creatinine Ratio      7.0 - 25.0       Anion Gap      mmol/L       Total Cholesterol      0 - 200 mg/dL       Triglycerides      0 - 150 mg/dL       HDL  Cholesterol      40 - 60 mg/dL       LDL Cholesterol       0 - 100 mg/dL       VLDL Cholesterol      5 - 40 mg/dL       LDL/HDL Ratio             Testosterone, Total      ng/dL    318   Testosterone, Bioavailable      ng/dL    197   Testosterone, % Bioavailable      %    61.9   Hemoglobin A1C      4.80 - 5.60 %       TSH Baseline      0.270 - 4.200 mIU/mL 0.969      Free T4      0.93 - 1.70 ng/dL  0.77 (L)     Insulin-Like Growth Factor-1      75 - 216 ng/mL   179          Assessment/Plan       ICD-10-CM ICD-9-CM   1. 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 Hypogonadotropic hypogonadism (CMS/[facility]) E23.0 253.4   3. Impaired fasting glucose R73.01 790.21   4. Dyslipidemia E78.5 272.4   5. Class 3 severe obesity due to excess calories with serious comorbidity and body mass index (BMI) of 40.0 to 44.9 in adult (CMS/[facility]) E66.01 278.01    Z68.41 V85.41           History of Hypogonadism and was on clomiphene    We retested and bioavailable was normal x 3    Last assessment from 10-19 reveals a free androgen index of 44        ===================    Central Hypothyroidism confirmed by Free T4 by dialysis with neg MRI     Lab Results   Component Value Date    TSH 1.110 10/14/2019    TSH 0.969 06/17/2019    TSH 1.640 12/05/2018       Lab Results   Component Value Date    FREET4 0.83 (L) 10/14/2019    FREET4 0.77 (L) 06/17/2019    FREET4 0.61 (L) 12/05/2018     LT4 at 25 , increased to 50 -75 - increase to 88 mcgs daily     ----    2018     Nl IGF1  Nl prolactin    Nl  cosyntropin stim test     2018 - normal pituitary MRI    -----    Anemia    Do workup     Repeat nl with nl iron , b12, folic acid      WBC   Date Value Ref Range Status   10/14/2019 6.48 3.40 - 10.80 10*3/mm3 Final     RBC   Date Value Ref Range Status   10/14/2019 4.18 4.14 - 5.80 10*6/mm3 Final     Hemoglobin   Date Value Ref Range Status   10/14/2019 13.7 13.0 - 17.7 g/dL Final     Hematocrit   Date Value Ref Range Status   10/14/2019 39.6 37.5 - 51.0 % Final      MCV   Date Value Ref Range Status   10/14/2019 94.7 79.0 - 97.0 fL Final     MCH   Date Value Ref Range Status   10/14/2019 32.8 26.6 - 33.0 pg Final     MCHC   Date Value Ref Range Status   10/14/2019 34.6 31.5 - 35.7 g/dL Final     RDW   Date Value Ref Range Status   10/14/2019 13.2 12.3 - 15.4 % Final     RDW-SD   Date Value Ref Range Status   10/14/2019 45.6 37.0 - 54.0 fl Final     MPV   Date Value Ref Range Status   10/14/2019 11.0 6.0 - 12.0 fL Final     Platelets   Date Value Ref Range Status   10/14/2019 260 140 - 450 10*3/mm3 Final     Neutrophil %   Date Value Ref Range Status   10/14/2019 57.4 42.7 - 76.0 % Final     Lymphocyte %   Date Value Ref Range Status   10/14/2019 25.9 19.6 - 45.3 % Final     Monocyte %   Date Value Ref Range Status   10/14/2019 10.6 5.0 - 12.0 % Final     Eosinophil %   Date Value Ref Range Status   10/14/2019 4.3 0.3 - 6.2 % Final     Basophil %   Date Value Ref Range Status   10/14/2019 1.2 0.0 - 1.5 % Final     Immature Grans %   Date Value Ref Range Status   10/14/2019 0.6 (H) 0.0 - 0.5 % Final     Neutrophils, Absolute   Date Value Ref Range Status   10/14/2019 3.71 1.70 - 7.00 10*3/mm3 Final     Lymphocytes, Absolute   Date Value Ref Range Status   10/14/2019 1.68 0.70 - 3.10 10*3/mm3 Final     Monocytes, Absolute   Date Value Ref Range Status   10/14/2019 0.69 0.10 - 0.90 10*3/mm3 Final     Eosinophils, Absolute   Date Value Ref Range Status   10/14/2019 0.28 0.00 - 0.40 10*3/mm3 Final     Basophils, Absolute   Date Value Ref Range Status   10/14/2019 0.08 0.00 - 0.20 10*3/mm3 Final     Immature Grans, Absolute   Date Value Ref Range Status   10/14/2019 0.04 0.00 - 0.05 10*3/mm3 Final     nRBC   Date Value Ref Range Status   10/14/2019 0.0 0.0 - 0.2 /100 WBC Final       ----    IFG    Lab Results   Component Value Date    HGBA1C 5.90 (H) 10/14/2019    HGBA1C 5.30 06/17/2019    HGBA1C 5.5 05/12/2017     Lab Results   Component Value Date    CREATININE 0.99 10/14/2019      Lab Results   Component Value Date    GLUCOSE 96 10/14/2019    CALCIUM 9.5 10/14/2019     10/14/2019    K 4.0 10/14/2019    CO2 28.3 10/14/2019    CL 97 (L) 10/14/2019    BUN 11 10/14/2019    CREATININE 0.99 10/14/2019    EGFRIFNONA 82 10/14/2019    BCR 11.1 10/14/2019    ANIONGAP 10.7 10/14/2019         Defined threshold of what is normal and what requires tx       --    Using clonidine and losartan    Clonidine only at night , helps with profuse sweating    --  Obesity with prediabetes, hypertension, sleep apnea     In addition to already instituted diet and exercise we will add belviq or saxenda    No contrave due to depression       Orders Placed This Encounter   Procedures   • CBC Auto Differential     Standing Status:   Future     Number of Occurrences:   1   • Comprehensive Metabolic Panel     Standing Status:   Future     Number of Occurrences:   1   • Hemoglobin A1c     Standing Status:   Future     Number of Occurrences:   1   • Lipid Panel     Standing Status:   Future     Number of Occurrences:   1   • TSH     Standing Status:   Future     Number of Occurrences:   1   • T4, Free     Standing Status:   Future     Number of Occurrences:   1   • Free T4 By Dialysis / Mass Spec     Standing Status:   Future     Number of Occurrences:   1   • Testosterone     Standing Status:   Future     Number of Occurrences:   1   • Sex Horm Binding Globulin     Standing Status:   Future     Number of Occurrences:   1         A copy of my note was sent to Judson Gonzalez MD    Please see my above opinion and suggestions.       MDM

## 2019-10-15 LAB — SHBG SERPL-SCNC: 20.8 NMOL/L (ref 16.5–55.9)

## 2019-10-17 RX ORDER — LEVOTHYROXINE SODIUM 88 UG/1
88 TABLET ORAL DAILY
Qty: 30 TABLET | Refills: 11 | Status: SHIPPED | OUTPATIENT
Start: 2019-10-17 | End: 2020-02-14

## 2019-10-19 LAB — T4 FREE SERPL DIALY-MCNC: 0.61 NG/DL

## 2019-10-30 ENCOUNTER — HOSPITAL ENCOUNTER (OUTPATIENT)
Facility: HOSPITAL | Age: 44
Setting detail: HOSPITAL OUTPATIENT SURGERY
Discharge: HOME | End: 2019-10-30
Attending: INTERNAL MEDICINE | Admitting: INTERNAL MEDICINE

## 2019-10-30 ENCOUNTER — ANESTHESIA EVENT (OUTPATIENT)
Dept: GASTROENTEROLOGY | Facility: HOSPITAL | Age: 44
End: 2019-10-30

## 2019-10-30 ENCOUNTER — TELEPHONE (OUTPATIENT)
Dept: GASTROENTEROLOGY | Facility: CLINIC | Age: 44
End: 2019-10-30

## 2019-10-30 ENCOUNTER — ANESTHESIA (OUTPATIENT)
Dept: GASTROENTEROLOGY | Facility: HOSPITAL | Age: 44
End: 2019-10-30

## 2019-10-30 VITALS
TEMPERATURE: 97.6 F | RESPIRATION RATE: 13 BRPM | HEART RATE: 69 BPM | OXYGEN SATURATION: 98 % | BODY MASS INDEX: 40.46 KG/M2 | DIASTOLIC BLOOD PRESSURE: 92 MMHG | HEIGHT: 71 IN | WEIGHT: 289 LBS | SYSTOLIC BLOOD PRESSURE: 145 MMHG

## 2019-10-30 PROCEDURE — 25010000002 PROPOFOL 10 MG/ML EMULSION: Performed by: NURSE ANESTHETIST, CERTIFIED REGISTERED

## 2019-10-30 PROCEDURE — G0105 COLORECTAL SCRN; HI RISK IND: HCPCS | Performed by: INTERNAL MEDICINE

## 2019-10-30 RX ORDER — ONDANSETRON 2 MG/ML
4 INJECTION INTRAMUSCULAR; INTRAVENOUS ONCE AS NEEDED
Status: DISCONTINUED | OUTPATIENT
Start: 2019-10-30 | End: 2019-10-30 | Stop reason: HOSPADM

## 2019-10-30 RX ORDER — SODIUM CHLORIDE 0.9 % (FLUSH) 0.9 %
3-10 SYRINGE (ML) INJECTION AS NEEDED
Status: DISCONTINUED | OUTPATIENT
Start: 2019-10-30 | End: 2019-10-30 | Stop reason: HOSPADM

## 2019-10-30 RX ORDER — SODIUM CHLORIDE 9 MG/ML
100 INJECTION, SOLUTION INTRAVENOUS CONTINUOUS
Status: DISCONTINUED | OUTPATIENT
Start: 2019-10-30 | End: 2019-10-30 | Stop reason: HOSPADM

## 2019-10-30 RX ORDER — SODIUM CHLORIDE 0.9 % (FLUSH) 0.9 %
3 SYRINGE (ML) INJECTION EVERY 12 HOURS SCHEDULED
Status: DISCONTINUED | OUTPATIENT
Start: 2019-10-30 | End: 2019-10-30 | Stop reason: HOSPADM

## 2019-10-30 RX ORDER — PROPOFOL 10 MG/ML
VIAL (ML) INTRAVENOUS AS NEEDED
Status: DISCONTINUED | OUTPATIENT
Start: 2019-10-30 | End: 2019-10-30 | Stop reason: SURG

## 2019-10-30 RX ADMIN — PROPOFOL 50 MG: 10 INJECTION, EMULSION INTRAVENOUS at 12:22

## 2019-10-30 RX ADMIN — PROPOFOL 100 MG: 10 INJECTION, EMULSION INTRAVENOUS at 12:10

## 2019-10-30 RX ADMIN — PROPOFOL 50 MG: 10 INJECTION, EMULSION INTRAVENOUS at 12:18

## 2019-10-30 RX ADMIN — SODIUM CHLORIDE 100 ML/HR: 9 INJECTION, SOLUTION INTRAVENOUS at 10:24

## 2019-10-30 RX ADMIN — PROPOFOL 50 MG: 10 INJECTION, EMULSION INTRAVENOUS at 12:26

## 2019-10-30 RX ADMIN — PROPOFOL 50 MG: 10 INJECTION, EMULSION INTRAVENOUS at 12:13

## 2019-10-30 NOTE — ANESTHESIA PREPROCEDURE EVALUATION
Anesthesia Evaluation     no history of anesthetic complications:  NPO Solid Status: > 8 hours  NPO Liquid Status: > 2 hours           Airway   Mallampati: II  TM distance: >3 FB  Neck ROM: full  Dental - normal exam     Pulmonary - normal exam    breath sounds clear to auscultation  (+) a smoker (occasional) Current, asthma,sleep apnea on CPAP,   (-) recent URI  Cardiovascular   Exercise tolerance: good (4-7 METS)    Rhythm: regular  Rate: normal    (+) hypertension, hyperlipidemia,   (-) pacemaker, past MI, angina, cardiac stents, CABG      Neuro/Psych  (-) seizures, TIA, CVA  GI/Hepatic/Renal/Endo    (+) obesity,  GERD,  liver disease fatty liver disease, thyroid problem hypothyroidism  (-) no renal disease, diabetes    Musculoskeletal     Abdominal    Substance History      OB/GYN          Other                        Anesthesia Plan    ASA 3     MAC     intravenous induction   Anesthetic plan, all risks, benefits, and alternatives have been provided, discussed and informed consent has been obtained with: patient.

## 2019-10-30 NOTE — ANESTHESIA POSTPROCEDURE EVALUATION
Patient: Carlos Mena    Procedure Summary     Date:  10/30/19 Room / Location:  [facility] ENDOSCOPY 6 / [facility]    Anesthesia Start:  1209 Anesthesia Stop:  1227    Procedure:  COLONOSCOPY WITH ANESTHESIA (N/A ) Diagnosis:       History of adenomatous polyp of colon      Family hx of colon cancer      (History of adenomatous polyp of colon [Z86.010])      (Family hx of colon cancer [Z80.0])    Surgeon:  Perfecto Peraza MD Provider:  Christophe Barrera CRNA    Anesthesia Type:  MAC ASA Status:  3          Anesthesia Type: MAC  Last vitals  BP   161/91 (10/30/19 1000)   Temp   97.6 °F (36.4 °C) (10/30/19 1000)   Pulse   70 (10/30/19 1000)   Resp   18 (10/30/19 1000)     SpO2   97 % (10/30/19 1000)     Post Anesthesia Care and Evaluation    Patient location during evaluation: PHASE II  Patient participation: complete - patient participated  Level of consciousness: awake and alert  Pain score: 0  Pain management: adequate  Airway patency: patent  Anesthetic complications: No anesthetic complications  PONV Status: none  Cardiovascular status: acceptable and stable  Respiratory status: acceptable  Hydration status: acceptable

## 2020-02-12 ENCOUNTER — APPOINTMENT (OUTPATIENT)
Dept: LAB | Facility: HOSPITAL | Age: 45
End: 2020-02-12

## 2020-02-12 LAB
ALBUMIN SERPL-MCNC: 4.4 G/DL (ref 3.5–5.2)
ALBUMIN/GLOB SERPL: 1.5 G/DL
ALP SERPL-CCNC: 89 U/L (ref 39–117)
ALT SERPL W P-5'-P-CCNC: 59 U/L (ref 1–41)
ANION GAP SERPL CALCULATED.3IONS-SCNC: 11.8 MMOL/L (ref 5–15)
AST SERPL-CCNC: 40 U/L (ref 1–40)
BASOPHILS # BLD AUTO: 0.07 10*3/MM3 (ref 0–0.2)
BASOPHILS NFR BLD AUTO: 1.2 % (ref 0–1.5)
BILIRUB SERPL-MCNC: 0.4 MG/DL (ref 0.2–1.2)
BUN BLD-MCNC: 11 MG/DL (ref 6–20)
BUN/CREAT SERPL: 10.2 (ref 7–25)
CALCIUM SPEC-SCNC: 9.4 MG/DL (ref 8.6–10.5)
CHLORIDE SERPL-SCNC: 102 MMOL/L (ref 98–107)
CHOLEST SERPL-MCNC: 139 MG/DL (ref 0–200)
CO2 SERPL-SCNC: 25.2 MMOL/L (ref 22–29)
CORTIS SERPL-MCNC: 15.37 MCG/DL
CREAT BLD-MCNC: 1.08 MG/DL (ref 0.76–1.27)
DEPRECATED RDW RBC AUTO: 40.8 FL (ref 37–54)
EOSINOPHIL # BLD AUTO: 0.19 10*3/MM3 (ref 0–0.4)
EOSINOPHIL NFR BLD AUTO: 3.2 % (ref 0.3–6.2)
ERYTHROCYTE [DISTWIDTH] IN BLOOD BY AUTOMATED COUNT: 12.6 % (ref 12.3–15.4)
GFR SERPL CREATININE-BSD FRML MDRD: 74 ML/MIN/1.73
GLOBULIN UR ELPH-MCNC: 3 GM/DL
GLUCOSE BLD-MCNC: 98 MG/DL (ref 65–99)
HBA1C MFR BLD: 5.8 % (ref 4.8–5.6)
HCT VFR BLD AUTO: 38 % (ref 37.5–51)
HDLC SERPL-MCNC: 32 MG/DL (ref 40–60)
HGB BLD-MCNC: 13.2 G/DL (ref 13–17.7)
IMM GRANULOCYTES # BLD AUTO: 0.03 10*3/MM3 (ref 0–0.05)
IMM GRANULOCYTES NFR BLD AUTO: 0.5 % (ref 0–0.5)
LDLC SERPL CALC-MCNC: 79 MG/DL (ref 0–100)
LDLC/HDLC SERPL: 2.46 {RATIO}
LYMPHOCYTES # BLD AUTO: 1.34 10*3/MM3 (ref 0.7–3.1)
LYMPHOCYTES NFR BLD AUTO: 22.5 % (ref 19.6–45.3)
MCH RBC QN AUTO: 31.6 PG (ref 26.6–33)
MCHC RBC AUTO-ENTMCNC: 34.7 G/DL (ref 31.5–35.7)
MCV RBC AUTO: 90.9 FL (ref 79–97)
MONOCYTES # BLD AUTO: 0.68 10*3/MM3 (ref 0.1–0.9)
MONOCYTES NFR BLD AUTO: 11.4 % (ref 5–12)
NEUTROPHILS # BLD AUTO: 3.65 10*3/MM3 (ref 1.7–7)
NEUTROPHILS NFR BLD AUTO: 61.2 % (ref 42.7–76)
NRBC BLD AUTO-RTO: 0 /100 WBC (ref 0–0.2)
PLATELET # BLD AUTO: 255 10*3/MM3 (ref 140–450)
PMV BLD AUTO: 10.5 FL (ref 6–12)
POTASSIUM BLD-SCNC: 3.8 MMOL/L (ref 3.5–5.2)
PROLACTIN SERPL-MCNC: 13.4 NG/ML (ref 4.04–15.2)
PROT SERPL-MCNC: 7.4 G/DL (ref 6–8.5)
RBC # BLD AUTO: 4.18 10*6/MM3 (ref 4.14–5.8)
SODIUM BLD-SCNC: 139 MMOL/L (ref 136–145)
T4 FREE SERPL-MCNC: 0.8 NG/DL (ref 0.93–1.7)
TESTOST SERPL-MCNC: 301 NG/DL (ref 249–836)
TRIGL SERPL-MCNC: 142 MG/DL (ref 0–150)
TSH SERPL DL<=0.05 MIU/L-ACNC: 0.68 UIU/ML (ref 0.27–4.2)
VLDLC SERPL-MCNC: 28.4 MG/DL (ref 5–40)
WBC NRBC COR # BLD: 5.96 10*3/MM3 (ref 3.4–10.8)

## 2020-02-12 PROCEDURE — 82533 TOTAL CORTISOL: CPT | Performed by: INTERNAL MEDICINE

## 2020-02-12 PROCEDURE — 85025 COMPLETE CBC W/AUTO DIFF WBC: CPT | Performed by: INTERNAL MEDICINE

## 2020-02-12 PROCEDURE — 80061 LIPID PANEL: CPT | Performed by: INTERNAL MEDICINE

## 2020-02-12 PROCEDURE — 84403 ASSAY OF TOTAL TESTOSTERONE: CPT | Performed by: INTERNAL MEDICINE

## 2020-02-12 PROCEDURE — 84439 ASSAY OF FREE THYROXINE: CPT | Performed by: INTERNAL MEDICINE

## 2020-02-12 PROCEDURE — 83036 HEMOGLOBIN GLYCOSYLATED A1C: CPT | Performed by: INTERNAL MEDICINE

## 2020-02-12 PROCEDURE — 80053 COMPREHEN METABOLIC PANEL: CPT | Performed by: INTERNAL MEDICINE

## 2020-02-12 PROCEDURE — 84270 ASSAY OF SEX HORMONE GLOBUL: CPT | Performed by: INTERNAL MEDICINE

## 2020-02-12 PROCEDURE — 36415 COLL VENOUS BLD VENIPUNCTURE: CPT | Performed by: INTERNAL MEDICINE

## 2020-02-12 PROCEDURE — 82024 ASSAY OF ACTH: CPT | Performed by: INTERNAL MEDICINE

## 2020-02-12 PROCEDURE — 84443 ASSAY THYROID STIM HORMONE: CPT | Performed by: INTERNAL MEDICINE

## 2020-02-12 PROCEDURE — 84146 ASSAY OF PROLACTIN: CPT | Performed by: INTERNAL MEDICINE

## 2020-02-13 LAB
ACTH PLAS-MCNC: 22.3 PG/ML (ref 7.2–63.3)
SHBG SERPL-SCNC: 17.7 NMOL/L (ref 16.5–55.9)

## 2020-02-14 ENCOUNTER — OFFICE VISIT (OUTPATIENT)
Dept: ENDOCRINOLOGY | Facility: CLINIC | Age: 45
End: 2020-02-14

## 2020-02-14 VITALS
HEIGHT: 71 IN | HEART RATE: 80 BPM | WEIGHT: 292.4 LBS | OXYGEN SATURATION: 98 % | DIASTOLIC BLOOD PRESSURE: 80 MMHG | BODY MASS INDEX: 40.94 KG/M2 | SYSTOLIC BLOOD PRESSURE: 138 MMHG

## 2020-02-14 DIAGNOSIS — E78.5 DYSLIPIDEMIA: ICD-10-CM

## 2020-02-14 DIAGNOSIS — E11.9 CONTROLLED TYPE 2 DIABETES MELLITUS WITHOUT COMPLICATION, WITHOUT LONG-TERM CURRENT USE OF INSULIN (HCC): ICD-10-CM

## 2020-02-14 DIAGNOSIS — E03.8 CENTRAL HYPOTHYROIDISM: Primary | ICD-10-CM

## 2020-02-14 DIAGNOSIS — E23.0 HYPOGONADOTROPIC HYPOGONADISM (HCC): ICD-10-CM

## 2020-02-14 PROCEDURE — 99214 OFFICE O/P EST MOD 30 MIN: CPT | Performed by: INTERNAL MEDICINE

## 2020-02-14 RX ORDER — DICLOFENAC SODIUM 75 MG/1
TABLET, DELAYED RELEASE ORAL
COMMUNITY
Start: 2020-02-07

## 2020-02-14 RX ORDER — LEVOTHYROXINE SODIUM 112 UG/1
112 TABLET ORAL DAILY
Qty: 30 TABLET | Refills: 11 | Status: SHIPPED | OUTPATIENT
Start: 2020-02-14 | End: 2020-08-26

## 2020-02-14 NOTE — PROGRESS NOTES
Carlos Mena is a 44 y.o. male who presents for  evaluation of   Chief Complaint   Patient presents with   • Hypothyroidism       Referring provider   Primary Care Provider    Judson Gonzalez MD    44-year-old male comes for follow-up.    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    I reevaluated testosterone off Clomid and total testosterone was low but bioavailable testosterone was normal ×2.  Last assessment in 10-19 is normal as well    In our workup with the proved low free T4 confirmed by dialysis and he has been on levothyroxine.    Additional workup for pituitary axis included a cosyntropin stimulation test, measurement of IGF-I, prolactin and all other pituitary hormones are normal.    Additional testing revealed impaired fasting glucose    Past Medical History:   Diagnosis Date   • Arthritis    • Asthma    • Essential hypertension 8/3/2018   • Fatty liver    • GERD (gastroesophageal reflux disease)    • History of adenomatous polyp of colon    • Hypercholesterolemia 8/3/2018   • Hypogonadism in male 8/3/2018   • Peripheral neuropathy    • Thyroid disorder      Family History   Problem Relation Age of Onset   • Diabetes Mother    • Colon cancer Paternal Uncle      Social History     Tobacco Use   • Smoking status: Former Smoker     Years: 4.00     Types: Cigarettes     Last attempt to quit:      Years since quittin.1   • Smokeless tobacco: Never Used   Substance Use Topics   • Alcohol use: Yes     Comment: social   • Drug use: No         Current Outpatient Medications:   •  atorvastatin (LIPITOR) 20 MG tablet, Take 20 mg by mouth Daily., Disp: , Rfl:   •  Blood Glucose Monitoring Suppl w/Device kit, USE AS INDICATED, ANY MONITOR , ICD10 code is E11.9, Disp: 1 each, Rfl: 1  •  CloNIDine (CATAPRES) 0.3 MG tablet, Take 0.3 mg by mouth 2  (Two) Times a Day., Disp: , Rfl:   •  Cyanocobalamin (VITAMIN B 12 PO), Take  by mouth Daily., Disp: , Rfl:   •  diclofenac (VOLTAREN) 75 MG EC tablet, , Disp: , Rfl:   •  esomeprazole (nexIUM) 40 MG capsule, Take 40 mg by mouth Every Morning Before Breakfast., Disp: , Rfl:   •  FLUoxetine (PROzac) 20 MG capsule, Take 40 mg by mouth Daily., Disp: , Rfl:   •  Fluticasone Furoate 100 MCG/ACT aerosol powder , Inhale Daily., Disp: , Rfl:   •  Glucose Blood (BLOOD GLUCOSE TEST) strip, Use 4 x daily, use any brand covered by insurance or same brand as before , ICD10 code is E11.9, Disp: 120 each, Rfl: 11  •  Insulin Pen Needle (B-D UF III MINI PEN NEEDLES) 31G X 5 MM misc, Use 4 times daily  , ICD10 code is E11.9, Disp: 50 each, Rfl: 11  •  Lancet Devices (LANCING DEVICE) misc, USE AS INDICATED TO CORRELATE WITH STRIPS AND METER, Disp: 1 each, Rfl: 1  •  Lancets 30G misc, USE 4 X DAILY, Disp: 120 each, Rfl: 11  •  levothyroxine (SYNTHROID) 88 MCG tablet, Take 1 tablet by mouth Daily., Disp: 30 tablet, Rfl: 11  •  losartan (COZAAR) 100 MG tablet, Take 100 mg by mouth Daily., Disp: , Rfl:   •  montelukast (SINGULAIR) 10 MG tablet, Take 10 mg by mouth Every Night., Disp: , Rfl:   •  Multiple Vitamins-Minerals (MULTIVITAMIN ADULT PO), Take 1 tablet by mouth Daily., Disp: , Rfl:   •  OXcarbazepine (TRILEPTAL) 150 MG tablet, Take 150 mg by mouth 2 (Two) Times a Day., Disp: , Rfl:   •  vitamin D (ERGOCALCIFEROL) 87397 units capsule capsule, Take 50,000 Units by mouth 1 (One) Time Per Week., Disp: , Rfl:     Review of Systems    Review of Systems   Constitutional: Positive for fatigue. Negative for activity change, appetite change, chills, diaphoresis, fever and unexpected weight change.   HENT: Negative for congestion, dental problem, drooling, ear discharge, ear pain, facial swelling, mouth sores, postnasal drip, rhinorrhea, sinus pressure, sore throat, tinnitus, trouble swallowing and voice change.    Eyes: Negative for  "photophobia, pain, discharge, redness, itching and visual disturbance.   Respiratory: Negative for apnea, cough, choking, chest tightness, shortness of breath, wheezing and stridor.    Cardiovascular: Negative for chest pain, palpitations and leg swelling.   Gastrointestinal: Negative for abdominal distention, abdominal pain, constipation, diarrhea, nausea and vomiting.   Endocrine: Negative for cold intolerance, heat intolerance, polydipsia, polyphagia and polyuria.   Genitourinary: Negative for decreased urine volume, difficulty urinating, dysuria, flank pain, frequency, hematuria and urgency.   Musculoskeletal: Negative for arthralgias, back pain, gait problem, joint swelling, myalgias, neck pain and neck stiffness.   Skin: Negative for color change, pallor, rash and wound.   Allergic/Immunologic: Negative for immunocompromised state.   Neurological: Positive for weakness. Negative for dizziness, tremors, seizures, syncope, facial asymmetry, speech difficulty, light-headedness, numbness and headaches.   Hematological: Negative for adenopathy.   Psychiatric/Behavioral: Negative for agitation, behavioral problems, confusion, decreased concentration, dysphoric mood, hallucinations, self-injury, sleep disturbance and suicidal ideas. The patient is not nervous/anxious and is not hyperactive.         Objective:   /80   Pulse 80   Ht 180.3 cm (71\")   Wt 133 kg (292 lb 6.4 oz)   SpO2 98%   BMI 40.78 kg/m²     Physical Exam   Constitutional: He is oriented to person, place, and time. He appears well-developed and well-nourished. He is cooperative.   HENT:   Head: Normocephalic and atraumatic.   Right Ear: External ear normal.   Left Ear: External ear normal.   Nose: Nose normal.   Mouth/Throat: Oropharynx is clear and moist. No oropharyngeal exudate.   Eyes: Pupils are equal, round, and reactive to light. Conjunctivae and EOM are normal. No scleral icterus. Right eye exhibits normal extraocular motion. Left eye " exhibits normal extraocular motion.   Neck: Neck supple. No JVD present. No muscular tenderness present. No tracheal deviation, no edema and no erythema present. No thyromegaly present.   Cardiovascular: Normal rate, regular rhythm, normal heart sounds and intact distal pulses. Exam reveals no gallop and no friction rub.   No murmur heard.  Pulmonary/Chest: Effort normal and breath sounds normal. No stridor. No respiratory distress. He has no decreased breath sounds. He has no wheezes. He has no rhonchi. He has no rales. He exhibits no tenderness.   Abdominal: Soft. Bowel sounds are normal. He exhibits no distension and no mass. There is no hepatomegaly. There is no tenderness. There is no rebound and no guarding. No hernia.   Musculoskeletal: Normal range of motion. He exhibits no edema, tenderness or deformity.   Lymphadenopathy:     He has no cervical adenopathy.   Neurological: He is alert and oriented to person, place, and time. He has normal reflexes. No cranial nerve deficit. He exhibits normal muscle tone. Coordination normal.   Skin: Skin is warm. No rash noted. No erythema. No pallor.   Psychiatric: He has a normal mood and affect. His behavior is normal. Judgment and thought content normal.   Nursing note and vitals reviewed.      Lab Review      Assessment/Plan       ICD-10-CM ICD-9-CM   1. 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 Controlled type 2 diabetes mellitus without complication, without long-term current use of insulin (CMS/[facility]) E11.9 250.00   3. Dyslipidemia E78.5 272.4   4. Hypogonadotropic hypogonadism (CMS/[facility]) E23.0 253.4           History of Hypogonadism and was on clomiphene    We retested and bioavailable was normal x 3    Last assessment from 10-19 reveals a free androgen index of 44        ===================    Central Hypothyroidism confirmed by Free T4 by dialysis with neg MRI     Lab Results   Component Value Date    TSH 0.676 02/12/2020    TSH 1.110 10/14/2019    TSH 0.969  06/17/2019       Lab Results   Component Value Date    FREET4 0.80 (L) 02/12/2020    FREET4 0.83 (L) 10/14/2019    FREET4 0.77 (L) 06/17/2019     LT4 at 25 , increased to 50 -75 - increase to 88 mcgs daily -112    ----    2018     Nl IGF1  Nl prolactin    Nl  cosyntropin stim test     2018 - normal pituitary MRI    -----    Anemia    Do workup     Repeat nl with nl iron , b12, folic acid      WBC   Date Value Ref Range Status   02/12/2020 5.96 3.40 - 10.80 10*3/mm3 Final     RBC   Date Value Ref Range Status   02/12/2020 4.18 4.14 - 5.80 10*6/mm3 Final     Hemoglobin   Date Value Ref Range Status   02/12/2020 13.2 13.0 - 17.7 g/dL Final     Hematocrit   Date Value Ref Range Status   02/12/2020 38.0 37.5 - 51.0 % Final     MCV   Date Value Ref Range Status   02/12/2020 90.9 79.0 - 97.0 fL Final     MCH   Date Value Ref Range Status   02/12/2020 31.6 26.6 - 33.0 pg Final     MCHC   Date Value Ref Range Status   02/12/2020 34.7 31.5 - 35.7 g/dL Final     RDW   Date Value Ref Range Status   02/12/2020 12.6 12.3 - 15.4 % Final     RDW-SD   Date Value Ref Range Status   02/12/2020 40.8 37.0 - 54.0 fl Final     MPV   Date Value Ref Range Status   02/12/2020 10.5 6.0 - 12.0 fL Final     Platelets   Date Value Ref Range Status   02/12/2020 255 140 - 450 10*3/mm3 Final     Neutrophil %   Date Value Ref Range Status   02/12/2020 61.2 42.7 - 76.0 % Final     Lymphocyte %   Date Value Ref Range Status   02/12/2020 22.5 19.6 - 45.3 % Final     Monocyte %   Date Value Ref Range Status   02/12/2020 11.4 5.0 - 12.0 % Final     Eosinophil %   Date Value Ref Range Status   02/12/2020 3.2 0.3 - 6.2 % Final     Basophil %   Date Value Ref Range Status   02/12/2020 1.2 0.0 - 1.5 % Final     Immature Grans %   Date Value Ref Range Status   02/12/2020 0.5 0.0 - 0.5 % Final     Neutrophils, Absolute   Date Value Ref Range Status   02/12/2020 3.65 1.70 - 7.00 10*3/mm3 Final     Lymphocytes, Absolute   Date Value Ref Range Status   02/12/2020  1.34 0.70 - 3.10 10*3/mm3 Final     Monocytes, Absolute   Date Value Ref Range Status   02/12/2020 0.68 0.10 - 0.90 10*3/mm3 Final     Eosinophils, Absolute   Date Value Ref Range Status   02/12/2020 0.19 0.00 - 0.40 10*3/mm3 Final     Basophils, Absolute   Date Value Ref Range Status   02/12/2020 0.07 0.00 - 0.20 10*3/mm3 Final     Immature Grans, Absolute   Date Value Ref Range Status   02/12/2020 0.03 0.00 - 0.05 10*3/mm3 Final     nRBC   Date Value Ref Range Status   02/12/2020 0.0 0.0 - 0.2 /100 WBC Final       ----    diabetes    Lab Results   Component Value Date    HGBA1C 5.80 (H) 02/12/2020    HGBA1C 5.90 (H) 10/14/2019    HGBA1C 5.30 06/17/2019     Lab Results   Component Value Date    CREATININE 1.08 02/12/2020     Lab Results   Component Value Date    GLUCOSE 98 02/12/2020    CALCIUM 9.4 02/12/2020     02/12/2020    K 3.8 02/12/2020    CO2 25.2 02/12/2020     02/12/2020    BUN 11 02/12/2020    CREATININE 1.08 02/12/2020    EGFRIFNONA 74 02/12/2020    BCR 10.2 02/12/2020    ANIONGAP 11.8 02/12/2020       Intolerant to metformin     Add rybelsus     --    Using clonidine and losartan    Clonidine only at night , helps with profuse sweating    --  Obesity with prediabetes, hypertension, sleep apnea     In addition to already instituted diet and exercise we will add belviq or saxenda - not paid for     No contrave due to depression       No orders of the defined types were placed in this encounter.        A copy of my note was sent to Judson Gonzalez MD    Please see my above opinion and suggestions.       MDM

## 2020-02-17 ENCOUNTER — TELEPHONE (OUTPATIENT)
Dept: ENDOCRINOLOGY | Facility: CLINIC | Age: 45
End: 2020-02-17

## 2020-02-17 LAB — T4 FREE SERPL DIALY-MCNC: 0.8 NG/DL

## 2020-02-17 NOTE — TELEPHONE ENCOUNTER
----- Message from Maxim Michel MD sent at 2/17/2020  2:39 PM CST -----  Regarding: RE: Rybelsus not on formulary  Can you call pt and see if he used the coupon provided.     The coupon should allow it to get it for 10 dollars  ----- Message -----  From: Katherine Diaz MA  Sent: 2/17/2020   1:40 PM CST  To: Maxim Michel MD  Subject: Rybelsus not on formulary                        Tried to do a PA and got this message,  Information regarding your request: The drug requested is not covered on the formulary for this member. What can we change it to?  Thanks,  Katherine

## 2020-03-11 RX ORDER — SILDENAFIL 100 MG/1
100 TABLET, FILM COATED ORAL AS NEEDED
Qty: 20 TABLET | Refills: 5 | Status: SHIPPED | OUTPATIENT
Start: 2020-03-11 | End: 2021-03-11

## 2020-04-03 ENCOUNTER — TELEPHONE (OUTPATIENT)
Dept: ENDOCRINOLOGY | Facility: CLINIC | Age: 45
End: 2020-04-03

## 2020-04-03 NOTE — TELEPHONE ENCOUNTER
cover my meds calling about a PA faxed  For rybelsus 7 mg   Ph  # [phone] ref # [id number]  Thank You

## 2020-05-01 ENCOUNTER — TELEPHONE (OUTPATIENT)
Dept: ENDOCRINOLOGY | Facility: CLINIC | Age: 45
End: 2020-05-01

## 2020-05-01 NOTE — TELEPHONE ENCOUNTER
Spoke with Ana KRUEGER with [employer] about pts Rybelsus. 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 Will need to change prescription.

## 2020-07-15 ENCOUNTER — LAB (OUTPATIENT)
Dept: LAB | Facility: HOSPITAL | Age: 45
End: 2020-07-15

## 2020-07-15 LAB
25(OH)D3 SERPL-MCNC: 40.9 NG/ML (ref 30–100)
ALBUMIN SERPL-MCNC: 4.4 G/DL (ref 3.5–5.2)
ALBUMIN/GLOB SERPL: 1.3 G/DL
ALP SERPL-CCNC: 94 U/L (ref 39–117)
ALT SERPL W P-5'-P-CCNC: 71 U/L (ref 1–41)
ANION GAP SERPL CALCULATED.3IONS-SCNC: 10.1 MMOL/L (ref 5–15)
AST SERPL-CCNC: 49 U/L (ref 1–40)
BASOPHILS # BLD AUTO: 0.05 10*3/MM3 (ref 0–0.2)
BASOPHILS NFR BLD AUTO: 0.9 % (ref 0–1.5)
BILIRUB SERPL-MCNC: 0.4 MG/DL (ref 0–1.2)
BUN SERPL-MCNC: 14 MG/DL (ref 6–20)
BUN/CREAT SERPL: 14.6 (ref 7–25)
CALCIUM SPEC-SCNC: 9.8 MG/DL (ref 8.6–10.5)
CHLORIDE SERPL-SCNC: 102 MMOL/L (ref 98–107)
CHOLEST SERPL-MCNC: 148 MG/DL (ref 0–200)
CO2 SERPL-SCNC: 27.9 MMOL/L (ref 22–29)
CREAT SERPL-MCNC: 0.96 MG/DL (ref 0.76–1.27)
DEPRECATED RDW RBC AUTO: 43.4 FL (ref 37–54)
EOSINOPHIL # BLD AUTO: 0.19 10*3/MM3 (ref 0–0.4)
EOSINOPHIL NFR BLD AUTO: 3.2 % (ref 0.3–6.2)
ERYTHROCYTE [DISTWIDTH] IN BLOOD BY AUTOMATED COUNT: 13.1 % (ref 12.3–15.4)
GFR SERPL CREATININE-BSD FRML MDRD: 85 ML/MIN/1.73
GLOBULIN UR ELPH-MCNC: 3.3 GM/DL
GLUCOSE SERPL-MCNC: 102 MG/DL (ref 65–99)
HBA1C MFR BLD: 5.7 % (ref 4.8–5.6)
HCT VFR BLD AUTO: 38.9 % (ref 37.5–51)
HDLC SERPL-MCNC: 31 MG/DL (ref 40–60)
HGB BLD-MCNC: 13.6 G/DL (ref 13–17.7)
IMM GRANULOCYTES # BLD AUTO: 0.04 10*3/MM3 (ref 0–0.05)
IMM GRANULOCYTES NFR BLD AUTO: 0.7 % (ref 0–0.5)
LDLC SERPL CALC-MCNC: 72 MG/DL (ref 0–100)
LDLC/HDLC SERPL: 2.34 {RATIO}
LYMPHOCYTES # BLD AUTO: 1.42 10*3/MM3 (ref 0.7–3.1)
LYMPHOCYTES NFR BLD AUTO: 24.2 % (ref 19.6–45.3)
MCH RBC QN AUTO: 32.2 PG (ref 26.6–33)
MCHC RBC AUTO-ENTMCNC: 35 G/DL (ref 31.5–35.7)
MCV RBC AUTO: 92.2 FL (ref 79–97)
MONOCYTES # BLD AUTO: 0.71 10*3/MM3 (ref 0.1–0.9)
MONOCYTES NFR BLD AUTO: 12.1 % (ref 5–12)
NEUTROPHILS NFR BLD AUTO: 3.46 10*3/MM3 (ref 1.7–7)
NEUTROPHILS NFR BLD AUTO: 58.9 % (ref 42.7–76)
NRBC BLD AUTO-RTO: 0 /100 WBC (ref 0–0.2)
PLATELET # BLD AUTO: 240 10*3/MM3 (ref 140–450)
PMV BLD AUTO: 10.9 FL (ref 6–12)
POTASSIUM SERPL-SCNC: 4.3 MMOL/L (ref 3.5–5.2)
PROT SERPL-MCNC: 7.7 G/DL (ref 6–8.5)
RBC # BLD AUTO: 4.22 10*6/MM3 (ref 4.14–5.8)
SODIUM SERPL-SCNC: 140 MMOL/L (ref 136–145)
T4 FREE SERPL-MCNC: 0.96 NG/DL (ref 0.93–1.7)
TESTOST SERPL-MCNC: 251 NG/DL (ref 249–836)
TRIGL SERPL-MCNC: 223 MG/DL (ref 0–150)
TSH SERPL DL<=0.05 MIU/L-ACNC: 0.14 UIU/ML (ref 0.27–4.2)
VIT B12 BLD-MCNC: 1710 PG/ML (ref 211–946)
VLDLC SERPL-MCNC: 44.6 MG/DL (ref 5–40)
WBC # BLD AUTO: 5.87 10*3/MM3 (ref 3.4–10.8)

## 2020-07-15 PROCEDURE — 80061 LIPID PANEL: CPT | Performed by: INTERNAL MEDICINE

## 2020-07-15 PROCEDURE — 36415 COLL VENOUS BLD VENIPUNCTURE: CPT | Performed by: INTERNAL MEDICINE

## 2020-07-15 PROCEDURE — 84410 TESTOSTERONE BIOAVAILABLE: CPT | Performed by: INTERNAL MEDICINE

## 2020-07-15 PROCEDURE — 84439 ASSAY OF FREE THYROXINE: CPT | Performed by: INTERNAL MEDICINE

## 2020-07-15 PROCEDURE — 82607 VITAMIN B-12: CPT | Performed by: INTERNAL MEDICINE

## 2020-07-15 PROCEDURE — 82306 VITAMIN D 25 HYDROXY: CPT | Performed by: INTERNAL MEDICINE

## 2020-07-15 PROCEDURE — 85025 COMPLETE CBC W/AUTO DIFF WBC: CPT | Performed by: INTERNAL MEDICINE

## 2020-07-15 PROCEDURE — 83036 HEMOGLOBIN GLYCOSYLATED A1C: CPT | Performed by: INTERNAL MEDICINE

## 2020-07-15 PROCEDURE — 80053 COMPREHEN METABOLIC PANEL: CPT | Performed by: INTERNAL MEDICINE

## 2020-07-15 PROCEDURE — 84443 ASSAY THYROID STIM HORMONE: CPT | Performed by: INTERNAL MEDICINE

## 2020-07-15 PROCEDURE — 84403 ASSAY OF TOTAL TESTOSTERONE: CPT | Performed by: INTERNAL MEDICINE

## 2020-07-18 NOTE — PROGRESS NOTES
" MICHELLE Hanna  [facility]   [facility]  [address] [place] [address]  Phone: [phone]  Fax: [phone]     Carlos Mena is a 44 y.o. male.   CC:   Chief Complaint   Patient presents with   • \"I am not having any problems.\"        HPI: Mr. Mena is being evaluated today for a follow-up of his asthma.  He experiences mild, intermittent shortness of breath and cough any years. It is improved with the use of Arnuity and Proventil when needed.  He is on Singulair at baseline. It is somewhat complicated by his thyroid disease, obesity and reflux.    He has not required his inhalers in a long time.  He is asking about as needed follow-up.  Last office visit he was caring for his wife and son who had both undergone a kidney transplant.  His wife had provided 1 of her kidneys to their son.  His only complaint today is a possible nerve impingement to the right shoulder.  He is employed as a  reports he does a lot of repetitive movement with his right upper extremity.    The following portions of the patient's history were reviewed and updated as appropriate: allergies, current medications, past family history, past medical history, past social history, past surgical history and problem list.    Past Medical History:   Diagnosis Date   • Arthritis    • Asthma    • Essential hypertension 8/3/2018   • Fatty liver    • GERD (gastroesophageal reflux disease)    • History of adenomatous polyp of colon    • Hypercholesterolemia 8/3/2018   • Hypogonadism in male 8/3/2018   • Peripheral neuropathy    • Thyroid disorder        Prior to Admission medications    Medication Sig Start Date End Date Taking? Authorizing Provider   atorvastatin (LIPITOR) 20 MG tablet Take 20 mg by mouth Daily.    Provider, MD Pennie   Blood Glucose Monitoring Suppl w/Device kit USE AS INDICATED, ANY MONITOR , ICD10 code is E11.9 12/6/18   Maxim Ceron MD   CloNIDine " (CATAPRES) 0.3 MG tablet Take 0.3 mg by mouth 2 (Two) Times a Day.    Pennie Sawyer MD   Cyanocobalamin (VITAMIN B 12 PO) Take  by mouth Daily.    Pennie Sawyer MD   diclofenac (VOLTAREN) 75 MG EC tablet  2/7/20   Pennie Sawyer MD   esomeprazole (nexIUM) 40 MG capsule Take 40 mg by mouth Every Morning Before Breakfast.    Pennie Sawyer MD   FLUoxetine (PROzac) 20 MG capsule Take 40 mg by mouth Daily.    Pennie Sawyer MD   Fluticasone Furoate 100 MCG/ACT aerosol powder  Inhale Daily.    Pennie Sawyer MD   Glucose Blood (BLOOD GLUCOSE TEST) strip Use 4 x daily, use any brand covered by insurance or same brand as before , ICD10 code is E11.9 12/6/18   Maxim Ceron MD   Insulin Pen Needle (B-D UF III MINI PEN NEEDLES) 31G X 5 MM misc Use 4 times daily  , ICD10 code is E11.9 10/14/19   Maxim Ceron MD   Lancet Devices (LANCING DEVICE) misc USE AS INDICATED TO CORRELATE WITH STRIPS AND METER 12/6/18   Maxim Ceron MD   Lancets 30G misc USE 4 X DAILY 12/6/18   Maxim Ceron MD   levothyroxine (SYNTHROID) 112 MCG tablet Take 1 tablet by mouth Daily. 2/14/20 2/13/21  Maxim Ceron MD   losartan (COZAAR) 100 MG tablet Take 100 mg by mouth Daily.    Pennie Sawyer MD   montelukast (SINGULAIR) 10 MG tablet Take 10 mg by mouth Every Night.    Pennie Sawyer MD   Multiple Vitamins-Minerals (MULTIVITAMIN ADULT PO) Take 1 tablet by mouth Daily.    Pennie Sawyer MD   OXcarbazepine (TRILEPTAL) 150 MG tablet Take 150 mg by mouth 2 (Two) Times a Day.    Pennie Sawyer MD   Semaglutide (RYBELSUS) 14 MG tablet Take 1 tablet by mouth Daily. 2/14/20   Maxim Ceron MD   Semaglutide (Rybelsus) 7 MG tablet Take 1 tablet by mouth Daily. 3/20/20   Maxim Ceron MD   sildenafil (VIAGRA) 100 MG tablet Take 1 tablet by mouth As Needed for Erectile Dysfunction. 3/11/20 3/11/21  Ann  Maxim Michel MD   vitamin D (ERGOCALCIFEROL) 92451 units capsule capsule Take 50,000 Units by mouth 1 (One) Time Per Week.    Provider, MD Pennie       Family History   Problem Relation Age of Onset   • Diabetes Mother    • Colon cancer Paternal Uncle    • No Known Problems Father    • No Known Problems Sister    • No Known Problems Brother    • No Known Problems Maternal Aunt    • No Known Problems Maternal Uncle    • No Known Problems Paternal Aunt    • No Known Problems Maternal Grandmother    • No Known Problems Maternal Grandfather    • No Known Problems Paternal Grandmother    • No Known Problems Paternal Grandfather    • No Known Problems Other    • Asthma Neg Hx    • Cancer Neg Hx    • Emphysema Neg Hx    • Heart failure Neg Hx    • Hypertension Neg Hx        Social History     Socioeconomic History   • Marital status:      Spouse name: Not on file   • Number of children: Not on file   • Years of education: Not on file   • Highest education level: Not on file   Tobacco Use   • Smoking status: Former Smoker     Years: 4.00     Types: Cigarettes     Last attempt to quit:      Years since quittin.5   • Smokeless tobacco: Never Used   • Tobacco comment: socially, 1 pack per 1.5 weeks   Substance and Sexual Activity   • Alcohol use: Yes     Comment: social   • Drug use: No   • Sexual activity: Defer       Review of Systems   Constitutional: Negative for activity change, chills, fatigue and fever.   HENT: Negative for congestion, postnasal drip, rhinorrhea, sinus pressure, sore throat and trouble swallowing.    Eyes: Negative for blurred vision, double vision and pain.   Respiratory: Negative for cough, chest tightness, shortness of breath and wheezing.    Cardiovascular: Negative for chest pain, palpitations and leg swelling.   Gastrointestinal: Negative for abdominal distention, constipation, diarrhea, nausea and vomiting.   Endocrine: Negative for polydipsia, polyphagia and polyuria.  "  Genitourinary: Negative for dysuria, frequency and urgency.   Musculoskeletal: Positive for arthralgias. Negative for back pain, gait problem and joint swelling.   Skin: Negative for color change, dry skin, rash and skin lesions.   Allergic/Immunologic: Negative for environmental allergies, food allergies and immunocompromised state.   Neurological: Negative for dizziness, seizures, speech difficulty, weakness, light-headedness, memory problem and confusion.   Hematological: Negative for adenopathy. Does not bruise/bleed easily.   Psychiatric/Behavioral: Negative for sleep disturbance, negative for hyperactivity and depressed mood. The patient is not nervous/anxious.        /80   Pulse 76   Temp 98.1 °F (36.7 °C)   Resp 16   Ht 180.3 cm (71\")   Wt 131 kg (289 lb)   SpO2 97% Comment: RA  BMI 40.31 kg/m²     Physical Exam   Constitutional: He is oriented to person, place, and time. He appears well-developed and well-nourished. No distress. Face mask in place. He is obese.  HENT:   Head: Normocephalic and atraumatic.   Right Ear: External ear normal.   Left Ear: External ear normal.   Nose: Nose normal.   Mouth/Throat: Oropharynx is clear and moist. No oropharyngeal exudate.   Eyes: Pupils are equal, round, and reactive to light. Conjunctivae and EOM are normal. Right eye exhibits no discharge. Left eye exhibits no discharge.   Neck: Normal range of motion. Neck supple. No JVD present.   Cardiovascular: Normal rate and regular rhythm.   No murmur heard.  Pulmonary/Chest: Effort normal and breath sounds normal. No respiratory distress. He has no wheezes.   Abdominal: Soft. Bowel sounds are normal. He exhibits no distension. There is no tenderness.   Musculoskeletal: Normal range of motion. He exhibits no edema or deformity.   Neurological: He is alert and oriented to person, place, and time. He displays normal reflexes. No cranial nerve deficit. Coordination normal.   Skin: Skin is warm and dry. No rash " noted. He is not diaphoretic. No erythema.   Psychiatric: He has a normal mood and affect. His behavior is normal. Thought content normal.   Nursing note and vitals reviewed.      Pulmonary Functions Testing Results:    PFT Values        Some values may be hidden. Unless noted otherwise, only the newest values recorded on each date are displayed.         Old Values PFT Results 5/22/19   FVC 90%   FEV1 79%   FEV1/FVC 72.20%      Pre Drug PFT Results 5/22/19   No data to display.      Post Drug PFT Results 5/22/19   No data to display.      Other Tests PFT Results 5/22/19   No data to display.           Results for orders placed in visit on 05/22/19   Pulmonary Function Test   Results for orders placed during the hospital encounter of 12/26/17   Pulmonary Function Test Lung volumes, DLCO, Spirometry    [facility] - Pulmonary Function Test    [address] [place] [address]  [id number]  [phone]    Patient : Carlos Mena   MRN : [id number]  CSN : [id number]  Pulmonologist : Damon Ayon MD  Date : 12/26/2017    ______________________________________________________________________    Interpretation :  1.  Spirometry is consistent with a moderate obstructive ventilatory   defect.  2.  Lung volumes reveal a decrease in vital capacity secondary to   obstruction.  In addition hyperinflation is present.  3.  Diffusion capacity is within normal limits.      Damon Ayon MD                       No PFTs for this visit    CXR: No imaging for this visit        Problem List Items Addressed This Visit        Respiratory    Mild intermittent asthma without complication - Primary    Relevant Medications    albuterol sulfate  (90 Base) MCG/ACT inhaler       Endocrine    Central hypothyroidism        Patient's Body mass index is 40.31 kg/m². BMI is above normal parameters. Recommendations include: educational material.      Assessment/Plan         Asthma very stable.  He has not required  bronchodilation in over a year.  He is requesting as needed follow-up.  This is reasonable given his stability over the last 2 years.  He will call if needed.    Katya Ramos, MICHELLE  7/23/2020  15:03    Return if symptoms worsen or fail to improve.

## 2020-07-20 PROBLEM — J45.20 MILD INTERMITTENT ASTHMA WITHOUT COMPLICATION: Status: ACTIVE | Noted: 2018-11-17

## 2020-07-20 LAB
BIOAVAILABLE TESTOSTERONE, %: 46.2 %
BIOAVAILABLE TESTOSTERONE, S: 116 NG/DL
T4 FREE SERPL DIALY-MCNC: 0.88 NG/DL
TESTOST SERPL-MCNC: 250 NG/DL

## 2020-07-23 ENCOUNTER — OFFICE VISIT (OUTPATIENT)
Dept: PULMONOLOGY | Facility: CLINIC | Age: 45
End: 2020-07-23

## 2020-07-23 VITALS
HEIGHT: 71 IN | TEMPERATURE: 98.1 F | BODY MASS INDEX: 40.46 KG/M2 | RESPIRATION RATE: 16 BRPM | WEIGHT: 289 LBS | HEART RATE: 76 BPM | OXYGEN SATURATION: 97 % | SYSTOLIC BLOOD PRESSURE: 140 MMHG | DIASTOLIC BLOOD PRESSURE: 80 MMHG

## 2020-07-23 DIAGNOSIS — J45.20 MILD INTERMITTENT ASTHMA WITHOUT COMPLICATION: Primary | ICD-10-CM

## 2020-07-23 DIAGNOSIS — E03.8 CENTRAL HYPOTHYROIDISM: ICD-10-CM

## 2020-07-23 PROCEDURE — 99214 OFFICE O/P EST MOD 30 MIN: CPT | Performed by: NURSE PRACTITIONER

## 2020-07-23 RX ORDER — ALBUTEROL SULFATE 90 UG/1
2 AEROSOL, METERED RESPIRATORY (INHALATION) EVERY 4 HOURS PRN
COMMUNITY
End: 2022-07-07

## 2020-07-23 NOTE — PATIENT INSTRUCTIONS

## 2020-07-28 RX ORDER — CLONIDINE HYDROCHLORIDE 0.3 MG/1
0.3 TABLET ORAL 2 TIMES DAILY
Qty: 60 TABLET | Refills: 0 | Status: SHIPPED | OUTPATIENT
Start: 2020-07-28

## 2020-08-26 ENCOUNTER — TELEMEDICINE (OUTPATIENT)
Dept: ENDOCRINOLOGY | Facility: CLINIC | Age: 45
End: 2020-08-26

## 2020-08-26 DIAGNOSIS — E78.5 DYSLIPIDEMIA: ICD-10-CM

## 2020-08-26 DIAGNOSIS — E11.9 CONTROLLED TYPE 2 DIABETES MELLITUS WITHOUT COMPLICATION, WITHOUT LONG-TERM CURRENT USE OF INSULIN (HCC): ICD-10-CM

## 2020-08-26 DIAGNOSIS — E03.8 CENTRAL HYPOTHYROIDISM: Primary | ICD-10-CM

## 2020-08-26 DIAGNOSIS — E78.00 HYPERCHOLESTEROLEMIA: ICD-10-CM

## 2020-08-26 DIAGNOSIS — E66.01 CLASS 3 SEVERE OBESITY DUE TO EXCESS CALORIES WITH SERIOUS COMORBIDITY AND BODY MASS INDEX (BMI) OF 40.0 TO 44.9 IN ADULT (HCC): ICD-10-CM

## 2020-08-26 DIAGNOSIS — I10 ESSENTIAL HYPERTENSION: ICD-10-CM

## 2020-08-26 PROCEDURE — 99214 OFFICE O/P EST MOD 30 MIN: CPT | Performed by: INTERNAL MEDICINE

## 2020-08-26 RX ORDER — LEVOTHYROXINE SODIUM 137 UG/1
137 TABLET ORAL DAILY
Qty: 90 TABLET | Refills: 3 | Status: SHIPPED | OUTPATIENT
Start: 2020-08-26 | End: 2021-02-26

## 2020-08-26 NOTE — PROGRESS NOTES
Carlos Mena is a 45 y.o. male who presents for  evaluation of   Hypothyroidism                                          This was a Telehealth Encounter. Benefits and Disadvantages of a Telehealth Visit were discussed and accepted by patient. .  Patient agreed to receive service through Telehealth visit as patient is being compliant with social distancing recommendations imparted by CDC.     You have chosen to receive care through a telehealth visit.  Do you consent to use a video/audio connection for your medical care today? Yes            Referring provider   Primary Care Provider    Judson Gonzalez MD    45-year-old male comes for follow-up.    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    I reevaluated testosterone off Clomid and total testosterone was low but bioavailable testosterone was normal ×2.  Last assessment in 10-19 is normal as well    In our workup with the proved low free T4 confirmed by dialysis and he has been on levothyroxine.    Additional workup for pituitary axis included a cosyntropin stimulation test, measurement of IGF-I, prolactin and all other pituitary hormones are normal.    Additional testing revealed impaired fasting glucose    Past Medical History:   Diagnosis Date   • Arthritis    • Asthma    • Essential hypertension 8/3/2018   • Fatty liver    • GERD (gastroesophageal reflux disease)    • History of adenomatous polyp of colon    • Hypercholesterolemia 8/3/2018   • Hypogonadism in male 8/3/2018   • Peripheral neuropathy    • Thyroid disorder      Family History   Problem Relation Age of Onset   • Diabetes Mother    • Colon cancer Paternal Uncle    • No Known Problems Father    • No Known Problems Sister    • No Known Problems Brother    • No Known Problems Maternal Aunt    • No Known Problems Maternal Uncle    • No Known  Problems Paternal Aunt    • No Known Problems Maternal Grandmother    • No Known Problems Maternal Grandfather    • No Known Problems Paternal Grandmother    • No Known Problems Paternal Grandfather    • No Known Problems Other    • Asthma Neg Hx    • Cancer Neg Hx    • Emphysema Neg Hx    • Heart failure Neg Hx    • Hypertension Neg Hx      Social History     Tobacco Use   • Smoking status: Former Smoker     Years: 4.00     Types: Cigarettes     Last attempt to quit:      Years since quittin.6   • Smokeless tobacco: Never Used   • Tobacco comment: socially, 1 pack per 1.5 weeks   Substance Use Topics   • Alcohol use: Yes     Comment: social   • Drug use: No         Current Outpatient Medications:   •  albuterol sulfate  (90 Base) MCG/ACT inhaler, Inhale 2 puffs Every 4 (Four) Hours As Needed for Wheezing., Disp: , Rfl:   •  atorvastatin (LIPITOR) 20 MG tablet, Take 20 mg by mouth Daily., Disp: , Rfl:   •  Blood Glucose Monitoring Suppl w/Device kit, USE AS INDICATED, ANY MONITOR , ICD10 code is E11.9, Disp: 1 each, Rfl: 1  •  cloNIDine (CATAPRES) 0.3 MG tablet, Take 1 tablet by mouth 2 (Two) Times a Day., Disp: 60 tablet, Rfl: 0  •  Cyanocobalamin (VITAMIN B 12 PO), Take  by mouth Daily., Disp: , Rfl:   •  diclofenac (VOLTAREN) 75 MG EC tablet, , Disp: , Rfl:   •  esomeprazole (nexIUM) 40 MG capsule, Take 40 mg by mouth Every Morning Before Breakfast., Disp: , Rfl:   •  FLUoxetine (PROzac) 20 MG capsule, Take 40 mg by mouth Daily., Disp: , Rfl:   •  Fluticasone Furoate 100 MCG/ACT aerosol powder , Inhale Daily., Disp: , Rfl:   •  Glucose Blood (BLOOD GLUCOSE TEST) strip, Use 4 x daily, use any brand covered by insurance or same brand as before , ICD10 code is E11.9, Disp: 120 each, Rfl: 11  •  Insulin Pen Needle (B-D UF III MINI PEN NEEDLES) 31G X 5 MM misc, Use 4 times daily  , ICD10 code is E11.9, Disp: 50 each, Rfl: 11  •  Lancet Devices (LANCING DEVICE) misc, USE AS INDICATED TO CORRELATE WITH  STRIPS AND METER, Disp: 1 each, Rfl: 1  •  Lancets 30G misc, USE 4 X DAILY, Disp: 120 each, Rfl: 11  •  levothyroxine (SYNTHROID) 112 MCG tablet, Take 1 tablet by mouth Daily., Disp: 30 tablet, Rfl: 11  •  losartan (COZAAR) 100 MG tablet, Take 100 mg by mouth Daily., Disp: , Rfl:   •  montelukast (SINGULAIR) 10 MG tablet, Take 10 mg by mouth Every Night., Disp: , Rfl:   •  Multiple Vitamins-Minerals (MULTIVITAMIN ADULT PO), Take 1 tablet by mouth Daily., Disp: , Rfl:   •  OXcarbazepine (TRILEPTAL) 150 MG tablet, Take 150 mg by mouth 2 (Two) Times a Day., Disp: , Rfl:   •  Semaglutide (RYBELSUS) 14 MG tablet, Take 1 tablet by mouth Daily., Disp: 30 tablet, Rfl: 11  •  Semaglutide (Rybelsus) 7 MG tablet, Take 1 tablet by mouth Daily., Disp: 30 tablet, Rfl: 0  •  sildenafil (VIAGRA) 100 MG tablet, Take 1 tablet by mouth As Needed for Erectile Dysfunction., Disp: 20 tablet, Rfl: 5  •  vitamin D (ERGOCALCIFEROL) 05762 units capsule capsule, Take 50,000 Units by mouth 1 (One) Time Per Week., Disp: , Rfl:     Review of Systems    Review of Systems   Constitutional: Positive for fatigue. Negative for activity change, appetite change, chills, diaphoresis, fever and unexpected weight change.   HENT: Negative for congestion, dental problem, drooling, ear discharge, ear pain, facial swelling, mouth sores, postnasal drip, rhinorrhea, sinus pressure, sore throat, tinnitus, trouble swallowing and voice change.    Eyes: Negative for photophobia, pain, discharge, redness, itching and visual disturbance.   Respiratory: Negative for apnea, cough, choking, chest tightness, shortness of breath, wheezing and stridor.    Cardiovascular: Negative for chest pain, palpitations and leg swelling.   Gastrointestinal: Negative for abdominal distention, abdominal pain, constipation, diarrhea, nausea and vomiting.   Endocrine: Negative for cold intolerance, heat intolerance, polydipsia, polyphagia and polyuria.   Genitourinary: Negative for  decreased urine volume, difficulty urinating, dysuria, flank pain, frequency, hematuria and urgency.   Musculoskeletal: Negative for arthralgias, back pain, gait problem, joint swelling, myalgias, neck pain and neck stiffness.   Skin: Negative for color change, pallor, rash and wound.   Allergic/Immunologic: Negative for immunocompromised state.   Neurological: Positive for weakness. Negative for dizziness, tremors, seizures, syncope, facial asymmetry, speech difficulty, light-headedness, numbness and headaches.   Hematological: Negative for adenopathy.   Psychiatric/Behavioral: Negative for agitation, behavioral problems, confusion, decreased concentration, dysphoric mood, hallucinations, self-injury, sleep disturbance and suicidal ideas. The patient is not nervous/anxious and is not hyperactive.         Objective:     Physical Exam   Constitutional: He appears well-developed and well-nourished. No distress.   HENT:   Head: Normocephalic.   Right Ear: External ear normal.   Left Ear: External ear normal.   Nose: Nose normal.   Mouth/Throat: Oropharynx is clear and moist. No oropharyngeal exudate.   Eyes: Pupils are equal, round, and reactive to light. Conjunctivae and EOM are normal. Right eye exhibits no discharge. Left eye exhibits no discharge. No scleral icterus.   Neck: Neck normal appearance.No JVD present. No tracheal deviation present. No thyromegaly present.   Cardiovascular:   No conjunctival pallor noted.    Pulmonary/Chest: Effort normal. No stridor.  No respiratory distress. He no audible wheeze...He exhibits no tenderness.   Abdominal: Abdomen appears normal. Soft. He exhibits no distension and no visible mass. There is no tenderness. No visible hernia present.   Musculoskeletal: Normal range of motion.         General: No tenderness, deformity, edema or no effusion.   Lymphadenopathy:     He has no cervical adenopathy.   Neurological: He is alert. No cranial nerve deficit. Coordination normal.    Skin: No rash noted. He is not diaphoretic. No nail bed cyanosis or erythema. No pallor. Nails show no clubbing.   Psychiatric: He mood appears normal. His affect is normal. His behavior is normal. Thought content is normal. He does not express abnormal judgement.       Lab Review      Assessment/Plan       ICD-10-CM ICD-9-CM   1. 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 Controlled type 2 diabetes mellitus without complication, without long-term current use of insulin (CMS/formerly [facility]) E11.9 250.00   3. Dyslipidemia E78.5 272.4   4. Class 3 severe obesity due to excess calories with serious comorbidity and body mass index (BMI) of 40.0 to 44.9 in adult (CMS/formerly [facility]) E66.01 278.01    Z68.41 V85.41   5. Hypercholesterolemia E78.00 272.0   6. Essential hypertension I10 401.9           History of Hypogonadism and was on clomiphene    We retested and bioavailable was normal x 4    We had long discussion on August 26, 2020 and decided to use only if needed         ===================    Central Hypothyroidism confirmed by Free T4 by dialysis with neg MRI     Lab Results   Component Value Date    TSH 0.135 (L) 07/15/2020    TSH 0.676 02/12/2020    TSH 1.110 10/14/2019       Lab Results   Component Value Date    FREET4 0.96 07/15/2020    FREET4 0.80 (L) 02/12/2020    FREET4 0.83 (L) 10/14/2019     LT4 at 25 , increased to 50 -75 - increase to 88 mcgs daily -112    Increase to 137 mcgs daily     ----    2018     Nl IGF1  Nl prolactin    Nl  cosyntropin stim test     2018 - normal pituitary MRI    -----    Anemia    Do workup     Repeat nl with nl iron , b12, folic acid      WBC   Date Value Ref Range Status   07/15/2020 5.87 3.40 - 10.80 10*3/mm3 Final     RBC   Date Value Ref Range Status   07/15/2020 4.22 4.14 - 5.80 10*6/mm3 Final     Hemoglobin   Date Value Ref Range Status   07/15/2020 13.6 13.0 - 17.7 g/dL Final     Hematocrit   Date Value Ref Range Status   07/15/2020 38.9 37.5 - 51.0 % Final     MCV   Date Value Ref Range  Status   07/15/2020 92.2 79.0 - 97.0 fL Final     MCH   Date Value Ref Range Status   07/15/2020 32.2 26.6 - 33.0 pg Final     MCHC   Date Value Ref Range Status   07/15/2020 35.0 31.5 - 35.7 g/dL Final     RDW   Date Value Ref Range Status   07/15/2020 13.1 12.3 - 15.4 % Final     RDW-SD   Date Value Ref Range Status   07/15/2020 43.4 37.0 - 54.0 fl Final     MPV   Date Value Ref Range Status   07/15/2020 10.9 6.0 - 12.0 fL Final     Platelets   Date Value Ref Range Status   07/15/2020 240 140 - 450 10*3/mm3 Final     Neutrophil %   Date Value Ref Range Status   07/15/2020 58.9 42.7 - 76.0 % Final     Lymphocyte %   Date Value Ref Range Status   07/15/2020 24.2 19.6 - 45.3 % Final     Monocyte %   Date Value Ref Range Status   07/15/2020 12.1 (H) 5.0 - 12.0 % Final     Eosinophil %   Date Value Ref Range Status   07/15/2020 3.2 0.3 - 6.2 % Final     Basophil %   Date Value Ref Range Status   07/15/2020 0.9 0.0 - 1.5 % Final     Immature Grans %   Date Value Ref Range Status   07/15/2020 0.7 (H) 0.0 - 0.5 % Final     Neutrophils, Absolute   Date Value Ref Range Status   07/15/2020 3.46 1.70 - 7.00 10*3/mm3 Final     Lymphocytes, Absolute   Date Value Ref Range Status   07/15/2020 1.42 0.70 - 3.10 10*3/mm3 Final     Monocytes, Absolute   Date Value Ref Range Status   07/15/2020 0.71 0.10 - 0.90 10*3/mm3 Final     Eosinophils, Absolute   Date Value Ref Range Status   07/15/2020 0.19 0.00 - 0.40 10*3/mm3 Final     Basophils, Absolute   Date Value Ref Range Status   07/15/2020 0.05 0.00 - 0.20 10*3/mm3 Final     Immature Grans, Absolute   Date Value Ref Range Status   07/15/2020 0.04 0.00 - 0.05 10*3/mm3 Final     nRBC   Date Value Ref Range Status   07/15/2020 0.0 0.0 - 0.2 /100 WBC Final       ----    diabetes    Lab Results   Component Value Date    HGBA1C 5.70 (H) 07/15/2020    HGBA1C 5.80 (H) 02/12/2020    HGBA1C 5.90 (H) 10/14/2019     Lab Results   Component Value Date    CREATININE 0.96 07/15/2020     Lab  Results   Component Value Date    GLUCOSE 102 (H) 07/15/2020    CALCIUM 9.8 07/15/2020     07/15/2020    K 4.3 07/15/2020    CO2 27.9 07/15/2020     07/15/2020    BUN 14 07/15/2020    CREATININE 0.96 07/15/2020    EGFRIFNONA 85 07/15/2020    BCR 14.6 07/15/2020    ANIONGAP 10.1 07/15/2020       Intolerant to metformin     Tolerates 3 mg daily but not higher doses     --    Using clonidine and losartan    Clonidine only at night , helps with profuse sweating    --  Obesity with prediabetes, hypertension, sleep apnea     No contrave due to depression     --    Elevated LFT - most likely fatty liver     --      No orders of the defined types were placed in this encounter.        A copy of my note was sent to Judson Gonzalez MD    Please see my above opinion and suggestions.       MDM     I spent 15 minutes reviewing patient electronic chart , reviewing medications , past history , active problems.   I provided advice regarding management of medical conditions, refilled prescriptions , ordered labs and arranged for future appointment.   Patient was advised to contact us if there were any unanswered questions or ongoing concerns.

## 2020-09-11 ENCOUNTER — HOSPITAL ENCOUNTER (OUTPATIENT)
Dept: GENERAL RADIOLOGY | Facility: HOSPITAL | Age: 45
Discharge: HOME | End: 2020-09-11
Admitting: INTERNAL MEDICINE

## 2020-09-11 ENCOUNTER — TRANSCRIBE ORDERS (OUTPATIENT)
Dept: ADMINISTRATIVE | Facility: HOSPITAL | Age: 45
End: 2020-09-11

## 2020-09-11 DIAGNOSIS — M54.2 CERVICALGIA: Primary | ICD-10-CM

## 2020-09-11 DIAGNOSIS — M54.2 CERVICALGIA: ICD-10-CM

## 2020-09-11 PROCEDURE — 72040 X-RAY EXAM NECK SPINE 2-3 VW: CPT

## 2020-11-18 ENCOUNTER — TRANSCRIBE ORDERS (OUTPATIENT)
Dept: PHYSICAL THERAPY | Facility: CLINIC | Age: 45
End: 2020-11-18

## 2020-11-20 ENCOUNTER — TRANSCRIBE ORDERS (OUTPATIENT)
Dept: PHYSICAL THERAPY | Facility: CLINIC | Age: 45
End: 2020-11-20

## 2020-11-20 DIAGNOSIS — M54.2 CERVICALGIA: ICD-10-CM

## 2020-11-20 DIAGNOSIS — G56.01 CARPAL TUNNEL SYNDROME, RIGHT UPPER LIMB: Primary | ICD-10-CM

## 2020-11-25 ENCOUNTER — TREATMENT (OUTPATIENT)
Dept: PHYSICAL THERAPY | Facility: CLINIC | Age: 45
End: 2020-11-25

## 2020-11-25 DIAGNOSIS — G56.03 BILATERAL CARPAL TUNNEL SYNDROME: ICD-10-CM

## 2020-11-25 DIAGNOSIS — M54.12 RADICULOPATHY, CERVICAL: Primary | ICD-10-CM

## 2020-11-25 DIAGNOSIS — M54.2 PAIN, NECK: ICD-10-CM

## 2020-11-25 PROCEDURE — 97140 MANUAL THERAPY 1/> REGIONS: CPT | Performed by: PHYSICAL THERAPIST

## 2020-11-25 PROCEDURE — 97162 PT EVAL MOD COMPLEX 30 MIN: CPT | Performed by: PHYSICAL THERAPIST

## 2020-11-25 NOTE — PROGRESS NOTES
Physical Therapy Initial Evaluation and Plan of Care        Patient: Carlos Mena   : 1975  Diagnosis/ICD-10 Code:  Radiculopathy, cervical [M54.12]  Referring practitioner: Judson Gonzalez MD  Date of Initial Visit: 2020  Today's Date: 2020  Patient seen for 1 sessions             Subjective Evaluation    History of Present Illness  Date of onset: 2019    Subjective comment: He's had neck and arm symptoms for about a year with no specific etiology. He says it's been getting worse. He struggles with work because of his hands going numb and his neck hurting.   Patient Occupation: [employer] Quality of life: excellent    Pain  Current pain ratin  At best pain ratin  At worst pain ratin  Location: tina lower neck/shoulders; tina UE numbness with tingling into hand  Quality: dull ache and radiating  Progression: worsening    Social Support  Lives in: one-story house  Lives with: spouse and young children    Hand dominance: right    Diagnostic Tests  X-ray: abnormal    Treatments  Previous treatment: medication  Current treatment: medication  Patient Goals  Patient goals for therapy: decreased pain and increased motion             Objective          Postural Observations  Seated posture: fair  Standing posture: good        Palpation   Left   Hypertonic in the levator scapulae and upper trapezius.   Muscle spasm in the levator scapulae and upper trapezius.   Tenderness of the levator scapulae and upper trapezius.   Trigger point to levator scapulae and upper trapezius.     Right   Hypertonic in the levator scapulae and upper trapezius.   Muscle spasm in the levator scapulae and upper trapezius. Tenderness of the levator scapulae and upper trapezius.   Trigger point to upper trapezius.     Neurological Testing     Sensation   Cervical/Thoracic   Left   Diminished: light touch    Right   Diminished: light touch    Comments   Left light touch: dec C7,8.   Right light touch: dec C6,7,8.      Reflexes   Left   Biceps (C5/C6): normal (2+)  Brachioradialis (C6): normal (2+)  Triceps (C7): normal (2+)  Patellar (L4): brisk (3+)  Achilles (S1): brisk (3+)    Right   Biceps (C5/C6): normal (2+)  Brachioradialis (C6): normal (2+)  Triceps (C7): normal (2+)  Patellar (L4): brisk (3+)  Achilles (S1): brisk (3+)    Active Range of Motion   Cervical/Thoracic Spine   Cervical    Flexion: WFL  Extension: 55 degrees with pain  Left lateral flexion: 25 degrees with pain  Right lateral flexion: 30 degrees with pain  Left rotation: 62 degrees with pain  Right rotation: 62 degrees with pain    Strength/Myotome Testing   Cervical Spine     Left   Normal strength    Right   Normal strength    Additional Strength Details  R  90#, L: 92#    Tests   Cervical     Left   Positive Spurling's sign.   Negative cervical distraction.     Right   Positive Spurling's sign.   Negative cervical distraction.     Left Wrist/Hand   Positive Phalen's sign and Tinel's sign (medial nerve).     Right Wrist/Hand   Positive Phalen's sign and Tinel's sign (medial nerve).     Additional Tests Details  Phalen more on right          Assessment & Plan     Assessment  Impairments: abnormal muscle tone, abnormal or restricted ROM, lacks appropriate home exercise program and pain with function  Assessment details: He appears to have 2 separate problems. He has positive signs of tina carpal tunnel syndrome as well as neck pain with facet joint pain and tina muscle guarding and radicular symptoms from his neck. He carries a forward head posture and his job has him gripping consistently throughout the day which would tend to keep a strain on his neck. I think he can do well with skilled PT. Today, I did a trial of dry needling and his tina upper traps reacted with quite a few latent twitch responses and were quite tender.   Prognosis: good  Functional Limitations: sleeping, uncomfortable because of pain, sitting, reaching behind back and unable to  "perform repetitive tasks  Goals  Plan Goals: STG (3 weeks)  1. Improve mobility through thoracic and CT junction:  2. Decreased muscle guarding throughout neck and shoulder girdle    LTG (6 weeks)  1. Reports no radicular symptoms for a week  2. Full cervical rotation tina with no pain  3. Report no neck/shoulder pain except occasional minor twinges no more than 2-3/10  4. Understands improved ergonomics for work and HEP for flexibility and posture    Plan  Therapy options: will be seen for skilled physical therapy services  Planned modality interventions: dry needling, TENS, low level laser therapy and traction  Planned therapy interventions: manual therapy, neuromuscular re-education, postural training, soft tissue mobilization, spinal/joint mobilization, strengthening, stretching, therapeutic activities, home exercise program, flexibility and body mechanics training  Frequency: 2x week  Duration in visits: 12  Treatment plan discussed with: patient  Plan details: Focus early on relaxation of muscles through dry needling and/or soft tissue mobilizations. Work on mobs through his upper thoracic and CT junction and improve flexibility. Also work on carpal mobilizations and nerve flossing. Progress HEP for the same.     Treatment today:  Trail Dry needle:  tina accessory n/upper traps (2\" with many LTR's, right more htan left); tina dorsal scap n (2\"); tina suprascap n (2\"); tina C5 post cut (2\" from post and lat approach); tina subocc, UT and splen cap (0.5\"); tina inf cerv oblique (1\"); tina sup and deep radial n (1\");  Supine tina subocc release, UT STM and upglide mobs tina mid cervical with cavitation tina:   reported neck feeling more relaxed and pain lower after.    Visit Diagnoses:    ICD-10-CM ICD-9-CM   1. Radiculopathy, cervical  M54.12 723.4   2. Pain, neck  M54.2 723.1   3. Bilateral carpal tunnel syndrome  G56.03 354.0       Timed:  Manual Therapy:    8     mins  64859;  Therapeutic Exercise:    0     mins  29067; "     Neuromuscular Ju:    0    mins  71438;    Therapeutic Activity:     0     mins  81563;     Gait Trainin     mins  34047;     Ultrasound:     0     mins  39549;    Electrical Stimulation:    0     mins  53856 ( );    Untimed:  Electrical Stimulation:    0     mins  74799 ( );  Mechanical Traction:    0     mins  13572;     Timed Treatment:   8 mins   Total Treatment:     0   mins    PT SIGNATURE: Khalif Live, PT   DATE TREATMENT INITIATED: 2020    Initial Certification  Certification Period: 2021  I certify that the therapy services are furnished while this patient is under my care.  The services outlined above are required by this patient, and will be reviewed every 90 days.     PHYSICIAN: Judson Gonzalez MD      DATE:     Please sign and return via fax to  .. Thank you, [facility] at [phone].

## 2020-11-30 ENCOUNTER — TELEPHONE (OUTPATIENT)
Dept: ORTHOPEDICS | Facility: OTHER | Age: 45
End: 2020-11-30

## 2020-12-01 ENCOUNTER — TREATMENT (OUTPATIENT)
Dept: PHYSICAL THERAPY | Facility: CLINIC | Age: 45
End: 2020-12-01

## 2020-12-01 DIAGNOSIS — G56.03 BILATERAL CARPAL TUNNEL SYNDROME: ICD-10-CM

## 2020-12-01 DIAGNOSIS — M54.2 PAIN, NECK: ICD-10-CM

## 2020-12-01 DIAGNOSIS — M54.12 RADICULOPATHY, CERVICAL: Primary | ICD-10-CM

## 2020-12-01 PROCEDURE — 97140 MANUAL THERAPY 1/> REGIONS: CPT | Performed by: PHYSICAL THERAPIST

## 2020-12-01 NOTE — PROGRESS NOTES
Physical Therapy Daily Progress Note      Patient: Carlos Mena   : 1975  Referring practitioner: No ref. provider found  Date of Initial Visit: Type: THERAPY  Noted: 2020  Today's Date: 2020  Patient seen for 2 sessions             Subjective Evaluation    History of Present Illness    Subjective comment: He says that the braces feel like they help his CTS. He still feels tight through his neck and shoulders. He was quite sore after the DN the last time. Pain  Current pain ratin           Objective   See Exercise, Manual, and Modality Logs for complete treatment.       Assessment & Plan     Assessment  Assessment details: The wrist braces appear to be helping his CTS. His neck is still quite stiff. It will take some time to work through the tightness and stiffness through his neck and upper girdle but he felt looser after today.      Goals  Plan Goals: STG (3 weeks)  1. Improve mobility through thoracic and CT junction: worked on this today  2. Decreased muscle guarding throughout neck and shoulder girdle    LTG (6 weeks)  1. Reports no radicular symptoms for a week  2. Full cervical rotation tina with no pain  3. Report no neck/shoulder pain except occasional minor twinges no more than 2-3/10  4. Understands improved ergonomics for work and HEP for flexibility and posture      Plan  Plan details: Cont emphasis on mobility through his CT area and carpal mobs.         Visit Diagnoses:    ICD-10-CM ICD-9-CM   1. Radiculopathy, cervical  M54.12 723.4   2. Pain, neck  M54.2 723.1   3. Bilateral carpal tunnel syndrome  G56.03 354.0       Progress per Plan of Care           Timed:  Manual Therapy:   60  mins  99561;  Therapeutic Exercise:    0     mins  85553;     Neuromuscular Ju:    0    mins  69641;    Therapeutic Activity:     0     mins  12911;     Gait Trainin     mins  79742;     Ultrasound:     0     mins  78289;    Electrical Stimulation:    0     mins  81232 (  );    Untimed:  Electrical Stimulation:    0     mins  56833 ( );  Mechanical Traction:    0     mins  60854;     Timed Treatment:  60   mins   Total Treatment:    60   mins  Khalif Live, PT  Physical Therapist

## 2020-12-04 ENCOUNTER — TREATMENT (OUTPATIENT)
Dept: PHYSICAL THERAPY | Facility: CLINIC | Age: 45
End: 2020-12-04

## 2020-12-04 DIAGNOSIS — M54.2 PAIN, NECK: ICD-10-CM

## 2020-12-04 DIAGNOSIS — M54.12 RADICULOPATHY, CERVICAL: Primary | ICD-10-CM

## 2020-12-04 DIAGNOSIS — G56.03 BILATERAL CARPAL TUNNEL SYNDROME: ICD-10-CM

## 2020-12-04 PROCEDURE — 97110 THERAPEUTIC EXERCISES: CPT | Performed by: PHYSICAL THERAPIST

## 2020-12-04 PROCEDURE — 97140 MANUAL THERAPY 1/> REGIONS: CPT | Performed by: PHYSICAL THERAPIST

## 2020-12-04 NOTE — PROGRESS NOTES
Physical Therapy Daily Progress Note      Patient: Carlos Mena   : 1975  Referring practitioner: Judson Gonzalez MD  Date of Initial Visit: Type: THERAPY  Noted: 2020  Today's Date: 2020  Patient seen for 3 sessions             Subjective Evaluation    History of Present Illness    Subjective comment: His tina UT have been really sore and tight. He didn't sleep well last night.Pain  Current pain ratin           Objective   See Exercise, Manual, and Modality Logs for complete treatment.       Assessment & Plan     Assessment  Assessment details: His neck is still quite tight and painful. He is still pretty tight through his chest and thoracic kyphosis. We focused more on flexibility today.       Goals  Plan Goals: STG (3 weeks)  1. Improve mobility through thoracic and CT junction: still quite tight  2. Decreased muscle guarding throughout neck and shoulder girdle    LTG (6 weeks)  1. Reports no radicular symptoms for a week: this has decreased with splints and stretching  2. Full cervical rotation tina with no pain: -7/10 today  3. Report no neck/shoulder pain except occasional minor twinges no more than 2-3/10  4. Understands improved ergonomics for work and HEP for flexibility and posture      Plan  Plan details: Cont emphasis on mobility through his CT area and carpal mobs and flexibility through chest.         Visit Diagnoses:    ICD-10-CM ICD-9-CM   1. Radiculopathy, cervical  M54.12 723.4   2. Pain, neck  M54.2 723.1   3. Bilateral carpal tunnel syndrome  G56.03 354.0       Progress per Plan of Care           Timed:  Manual Therapy:   55 mins  14235;  Therapeutic Exercise:    10     mins  71851;     Neuromuscular Ju:    0    mins  34813;    Therapeutic Activity:     0     mins  98234;     Gait Trainin     mins  91571;     Ultrasound:     0     mins  03765;    Electrical Stimulation:    0     mins  03663 ( );    Untimed:  Electrical Stimulation:    0     mins  27322  ( );  Mechanical Traction:    0     mins  64570;     Timed Treatment:  65   mins   Total Treatment:    70 mins  Khalif Live, PT  Physical Therapist

## 2020-12-08 ENCOUNTER — TREATMENT (OUTPATIENT)
Dept: PHYSICAL THERAPY | Facility: CLINIC | Age: 45
End: 2020-12-08

## 2020-12-08 DIAGNOSIS — M54.12 RADICULOPATHY, CERVICAL: Primary | ICD-10-CM

## 2020-12-08 DIAGNOSIS — G56.03 BILATERAL CARPAL TUNNEL SYNDROME: ICD-10-CM

## 2020-12-08 DIAGNOSIS — M54.2 PAIN, NECK: ICD-10-CM

## 2020-12-08 PROCEDURE — 97140 MANUAL THERAPY 1/> REGIONS: CPT | Performed by: PHYSICAL THERAPIST

## 2020-12-08 NOTE — PROGRESS NOTES
Physical Therapy Daily Progress Note      Patient: Carlos Mena   : 1975  Referring practitioner: Judson Gonzalez MD  Date of Initial Visit: Type: THERAPY  Noted: 2020  Today's Date: 2020  Patient seen for 4 sessions             Subjective Evaluation    History of Present Illness    Subjective comment: He sayys his hands are better but his shoulders have started hurting worse. His c/c today is base of his skull.Pain  Current pain ratin         Objective     Manual Therapy Comments   Prone thoracic Foam roll and man OP   prone CT Ext and rot mobs   Prone and supineC1/subocc  STM and PA, did firm OP sustained focusing on left C1 where he was most tender   Supine upglide man SNAGS tina mod to upper cervical facets        Timed Minutes 60       Assessment & Plan     Assessment  Assessment details: His hands are improving but his neck is still quite tight. His job has been challenging with the volume of packages. His primary pain today was more around his left C1 but he felt good relief after PT today.       Plan  Plan details: Cont emphasis on mobility through his CT area and upper cervical and carpal mobs and flexibility through chest.       Goals   STG by: 3 weeks Comments Status   1. Improve mobility through thoracic and CT junction: still quite tight     2. Decreased muscle guarding  throughout neck and shoulder girdle               LTG by: 6 weeks     1. Reports no radicular symptoms for a week: this has decreased with splints and stretching     2. Full cervical rotation tina with no pain: 6-7/10 today 6/10    3. Report no neck/shoulder pain except occasional minor twinges no more than 2-3/10 Pain 6/10 mostly in left upper cervical today    4. Understands improved ergonomics for work and HEP for flexibility and posture                   Visit Diagnoses:    ICD-10-CM ICD-9-CM   1. Radiculopathy, cervical  M54.12 723.4   2. Pain, neck  M54.2 723.1   3. Bilateral carpal tunnel syndrome  G56.03  354.0       Progress per Plan of Care    Therapy Education/Self Care    Details: Avoid impingement of shoulders with pect stretches   Given Home Exercise Program   Progress: Reinforced   Who provided to: Patient   Level of understanding Verbalized and Demonstrated   Timed Minutes               Timed Treatment:  60   mins   Total Treatment:    60 mins  Khalif Live, PT  Physical Therapist

## 2020-12-10 ENCOUNTER — TREATMENT (OUTPATIENT)
Dept: PHYSICAL THERAPY | Facility: CLINIC | Age: 45
End: 2020-12-10

## 2020-12-10 DIAGNOSIS — M54.12 RADICULOPATHY, CERVICAL: Primary | ICD-10-CM

## 2020-12-10 DIAGNOSIS — G56.03 BILATERAL CARPAL TUNNEL SYNDROME: ICD-10-CM

## 2020-12-10 DIAGNOSIS — M54.2 PAIN, NECK: ICD-10-CM

## 2020-12-10 PROCEDURE — 97140 MANUAL THERAPY 1/> REGIONS: CPT | Performed by: PHYSICAL THERAPIST

## 2020-12-10 NOTE — PROGRESS NOTES
Physical Therapy Initial Evaluation and Plan of Care    Patient: Carlos Mena   : 1975  Diagnosis/ICD-10 Code:  Radiculopathy, cervical [M54.12]  Referring practitioner: Judson Gonzalez MD  Date of Initial Visit: 12/10/2020  Today's Date: 12/10/2020  Patient seen for 5 sessions    Visit Diagnoses:    ICD-10-CM ICD-9-CM   1. Radiculopathy, cervical  M54.12 723.4   2. Pain, neck  M54.2 723.1   3. Bilateral carpal tunnel syndrome  G56.03 354.0          Subjective Questionnaire: {PT subjective questionnaires:19534}  Subjective     Objective     Assessment/Plan    Goals   STG by:  Comments Status                       LTG by:                                       PT SIGNATURE: Khalif Live PT   DATE TREATMENT INITIATED: 12/10/2020    {Certification Type:[id number]}  Certification Period: 3/10/2021  I certify that the therapy services are furnished while this patient is under my care.  The services outlined above are required by this patient, and will be reviewed every 90 days.     PHYSICIAN: Judson Gonzalez MD______________________________________________DATE: ___________________     Please sign and return via fax to [phone].   Thank you so much for letting us work with Carlos Mena. I appreciate your letting us work with your patients. If you have any questions or concerns, please don't hesitate to contact me.

## 2020-12-10 NOTE — PROGRESS NOTES
"Physical Therapy Treatment Note    Patient: Carlos Mena   : 1975  Referring practitioner: Judson Gonzalez MD  Date of Initial Visit: Type: THERAPY  Noted: 2020  Today's Date: 12/10/2020  Patient seen for 5 sessions  Visit Diagnoses:    ICD-10-CM ICD-9-CM   1. Radiculopathy, cervical  M54.12 723.4   2. Pain, neck  M54.2 723.1   3. Bilateral carpal tunnel syndrome  G56.03 354.0          Subjective Evaluation    History of Present Illness    Subjective comment: He says that his UT's are his c/c today. His upper neck is sore but not as bad as the last tme.Pain  Current pain ratin         Objective   Dry Needle Location Needle Size Comment   tina UT 2\" pistoning with several LTR's          Manual Therapy Comments   Prone tina CT ext/rotation mob    Prone tina UT STM/TrP release    Prone left C1 PA and STM    subocc release with left PA mob C1    Supine mid to upper cervical facet upglide SNAGs Cavitation tina side   Timed Minutes 55     Total Treatment:     55   mins    Assessment & Plan     Assessment  Assessment details: His upper neck was better after the mobs the last visit although he was pretty core. I thought that his looking out the right side of his car might be why the left upper neck was jammed up but he said that he also looks left.     Plan  Plan details: Cont to emphasize mobility of his CT and upper neck and postural flexibility.         Carlos was educated on HEP/posture including cervical retraction/rotation stretching while working. He reported and demonstrated understanding of these instructions.       Khalif Live, PT  Physical Therapist      "

## 2020-12-14 ENCOUNTER — TREATMENT (OUTPATIENT)
Dept: PHYSICAL THERAPY | Facility: CLINIC | Age: 45
End: 2020-12-14

## 2020-12-14 DIAGNOSIS — M54.2 PAIN, NECK: ICD-10-CM

## 2020-12-14 DIAGNOSIS — M54.12 RADICULOPATHY, CERVICAL: Primary | ICD-10-CM

## 2020-12-14 DIAGNOSIS — G56.03 BILATERAL CARPAL TUNNEL SYNDROME: ICD-10-CM

## 2020-12-14 PROCEDURE — 97110 THERAPEUTIC EXERCISES: CPT | Performed by: PHYSICAL THERAPIST

## 2020-12-14 PROCEDURE — 97140 MANUAL THERAPY 1/> REGIONS: CPT | Performed by: PHYSICAL THERAPIST

## 2020-12-14 NOTE — PROGRESS NOTES
"Physical Therapy Treatment Note    Patient: Carlos Mena   : 1975  Referring practitioner: Judson Gonzalez MD  Date of Initial Visit: Type: THERAPY  Noted: 2020  Today's Date: 2020  Patient seen for 6 sessions  Visit Diagnoses:    ICD-10-CM ICD-9-CM   1. Radiculopathy, cervical  M54.12 723.4   2. Pain, neck  M54.2 723.1   3. Bilateral carpal tunnel syndrome  G56.03 354.0          Subjective Evaluation    History of Present Illness    Subjective comment: He says his pain has been better. He's been using a roller  on his neck. He didn't work so his hands aren't flared today.Pain  Current pain rating: 3         Objective   Dry Needle Location Needle Size Comment               Manual Therapy                86442 Comments   Prone tina CT ext/rotation mob    Prone tina UT STM/TrP release    Prone left C1 PA and STM    subocc release with left PA mob C1    Supine mid to upper cervical facet upglide SNAGs Cavitation right side   tina carpal mobs including splaying Cavitations on right   Supine mid extension ext mobs    Timed Minutes 55     Therapeutic Exercises    24000 Comments   Supine thoracic ext stretch over 4\" horizontal bolster Cavitation mid thoracic   Standing doorway pect stretch                Timed Minutes 10       Total Treatment:      65   mins  Total Visits Time:     65   mins    Assessment & Plan     Assessment  Assessment details: He was better today but he was also off work a couple of days which helped. I reinforced posture today and we did more stretching to open his chest.     Plan  Plan details: Cont to focus on flexibility and mobs of CT junction       Goals   STG by: 3 weeks Comments Status   1. Improve mobility through thoracic and CT junction: still quite tight     2. Decreased muscle guarding  throughout neck and shoulder girdle               LTG by: 6 weeks     1. Reports no radicular symptoms for a week: this has decreased with splints and stretching     2. Full cervical " rotation tina with no pain: 6-7/10 today 3/10    3. Report no neck/shoulder pain except occasional minor twinges no more than 2-3/10 Pain 3/10 mostly in left upper cervical today    4. Understands improved ergonomics for work and HEP for flexibility and posture                   Therapy Education/Self Care 79468   Details: Reinforced flexibility   Given Home Exercise Program and postural retraining   Progress: Reinforced   Who provided to: Patient   Level of understanding Verbalized   Timed Minutes                           Khalif Live, PT  Physical Therapist

## 2020-12-16 ENCOUNTER — TREATMENT (OUTPATIENT)
Dept: PHYSICAL THERAPY | Facility: CLINIC | Age: 45
End: 2020-12-16

## 2020-12-16 DIAGNOSIS — G56.03 BILATERAL CARPAL TUNNEL SYNDROME: ICD-10-CM

## 2020-12-16 DIAGNOSIS — M54.12 RADICULOPATHY, CERVICAL: Primary | ICD-10-CM

## 2020-12-16 DIAGNOSIS — M54.2 PAIN, NECK: ICD-10-CM

## 2020-12-16 PROCEDURE — 97140 MANUAL THERAPY 1/> REGIONS: CPT | Performed by: PHYSICAL THERAPIST

## 2020-12-16 NOTE — PROGRESS NOTES
Physical Therapy Treatment Note    Patient: Carlos Mena   : 1975  Referring practitioner: Judson Gonzalez MD  Date of Initial Visit: Type: THERAPY  Noted: 2020  Today's Date: 2020  Patient seen for 7 sessions  Visit Diagnoses:    ICD-10-CM ICD-9-CM   1. Radiculopathy, cervical  M54.12 723.4   2. Pain, neck  M54.2 723.1   3. Bilateral carpal tunnel syndrome  G56.03 354.0          Subjective Evaluation    History of Present Illness    Subjective comment: He did really well until today when he feels like there is a burning pain like he's being branded on the back of his head. He still has no radicular symptoms and before today, he was able to turn his head well. Pain  Current pain ratin         Objective     Dry Needle Location [ (1-2 muscles)/ (3 or more muscles)]   Location Size Comment                      Manual Therapy                93830 Comments   Prone tina CT ext/rotation mob    Prone tina UT STM/TrP release    Prone left C1 PA and STM    subocc release with left PA mob C1    Supine mid to upper cervical facet upglide SNAGs Cavitations tina sides           Timed Minutes 60     Therapeutic Exercises    06679 Comments                       Timed Minutes      Total Timed Minutes:      60   mins  Total Visit Time:     65   mins    Assessment & Plan     Assessment  Assessment details: I'm still encouraged at his progress. He is consistently not having radicular signs. His neck will randomly flre particularly in his upper neck.    Plan  Plan details: Cont to focus on flexibility and mobs of CT junction       Goals   STG by: 3 weeks Comments Status   1. Improve mobility through thoracic and CT junction: still quite tight     2. Decreased muscle guarding  throughout neck and shoulder girdle               LTG by: 6 weeks     1. Reports no radicular symptoms for a week: this has decreased with splints and stretching     2. Full cervical rotation tina with no pain: -7/10 today 5/10  ongoing   3. Report no neck/shoulder pain except occasional minor twinges no more than 2-3/10 Pain 5/10 mostly in left upper cervical today ongoing   4. Understands improved ergonomics for work and HEP for flexibility and posture                   Therapy Education/Self Care 78046   Details: Reinforced flexibility   Given Home Exercise Program and postural retraining   Progress: Reinforced   Who provided to: Patient   Level of understanding Verbalized   Timed Minutes           Khalif Live, PT  Physical Therapist

## 2020-12-21 ENCOUNTER — TREATMENT (OUTPATIENT)
Dept: PHYSICAL THERAPY | Facility: CLINIC | Age: 45
End: 2020-12-21

## 2020-12-21 DIAGNOSIS — M54.2 PAIN, NECK: ICD-10-CM

## 2020-12-21 DIAGNOSIS — M54.12 RADICULOPATHY, CERVICAL: Primary | ICD-10-CM

## 2020-12-21 DIAGNOSIS — G56.03 BILATERAL CARPAL TUNNEL SYNDROME: ICD-10-CM

## 2020-12-21 PROCEDURE — 97140 MANUAL THERAPY 1/> REGIONS: CPT | Performed by: PHYSICAL THERAPIST

## 2020-12-21 NOTE — PROGRESS NOTES
Physical Therapy Treatment Note    Patient: Carlos Mena   : 1975  Referring practitioner: Judson Gonzalez MD  Date of Initial Visit: Type: THERAPY  Noted: 2020  Today's Date: 2020  Patient seen for 8 sessions  Visit Diagnoses:    ICD-10-CM ICD-9-CM   1. Radiculopathy, cervical  M54.12 723.4   2. Pain, neck  M54.2 723.1   3. Bilateral carpal tunnel syndrome  G56.03 354.0          Subjective Evaluation    History of Present Illness    Subjective comment: He says that the new pillows they got seem to be helping. He is still getting adjusted to it. Pain  Current pain rating: 3         Objective     Dry Needle Location [ (1-2 muscles)/ (3 or more muscles)]   Location Size Comment                      Manual Therapy                37387 Comments   Prone tina CT ext/rotation mob    Prone tina UT STM/TrP release    Prone left C1 PA and STM    subocc release with left PA mob C1    Supine mid to upper cervical facet upglide SNAGs Cavitations left side   tina carpal mobs        Timed Minutes 60     Therapeutic Exercises    62295 Comments                       Timed Minutes      Total Timed Minutes:      60   mins  Total Visit Time:     65   mins    Assessment & Plan     Assessment  Assessment details: His pillow seems to be effective in helping his neck. His pain is low today and he's consistently not having radicular symptoms.     Plan  Plan details: Cont to focus on flexibility and mobs of CT junction       Goals   STG by: 3 weeks Comments Status   1. Improve mobility through thoracic and CT junction: still quite tight     2. Decreased muscle guarding  throughout neck and shoulder girdle               LTG by: 6 weeks     1. Reports no radicular symptoms for a week: this has decreased with splints and stretching     2. Full cervical rotation tina with no pain: 6-7/10 today 5/10 ongoing   3. Report no neck/shoulder pain except occasional minor twinges no more than 2-3/10 Pain 5/10 mostly in left  upper cervical today ongoing   4. Understands improved ergonomics for work and HEP for flexibility and posture                   Therapy Education/Self Care 00901   Details: Reinforced flexibility   Given Home Exercise Program and postural retraining   Progress: Reinforced   Who provided to: Patient   Level of understanding Verbalized   Timed Minutes           Khalif Live, PT  Physical Therapist

## 2020-12-23 ENCOUNTER — TREATMENT (OUTPATIENT)
Dept: PHYSICAL THERAPY | Facility: CLINIC | Age: 45
End: 2020-12-23

## 2020-12-23 DIAGNOSIS — M54.2 PAIN, NECK: ICD-10-CM

## 2020-12-23 DIAGNOSIS — M54.12 RADICULOPATHY, CERVICAL: Primary | ICD-10-CM

## 2020-12-23 DIAGNOSIS — G56.03 BILATERAL CARPAL TUNNEL SYNDROME: ICD-10-CM

## 2020-12-23 PROCEDURE — 97140 MANUAL THERAPY 1/> REGIONS: CPT | Performed by: PHYSICAL THERAPIST

## 2020-12-23 NOTE — PROGRESS NOTES
Physical Therapy 30 Progress Note    Patient: Carlos Mena   : 1975  Referring practitioner: Judson Gonzalez MD  Date of Initial Visit: Type: THERAPY  Noted: 2020  Today's Date: 2020  Patient seen for 9 sessions  Visit Diagnoses:    ICD-10-CM ICD-9-CM   1. Radiculopathy, cervical  M54.12 723.4   2. Pain, neck  M54.2 723.1   3. Bilateral carpal tunnel syndrome  G56.03 354.0       SUBJECTIVE      Subjective Evaluation    History of Present Illness    Subjective comment: He's been able to adjust his pillows so he is sleeping better now. He worked with his hands alot today so he had more numbness. Pain  Current pain rating: 3  Location: tight tina lower neck and upper neck         OBJECTIVE     Objective     Dry Needle Location [ (1-2 muscles)/ (3 or more muscles)]   Location Size Comment                      Manual Therapy                13900 Comments   Prone tina CT ext/rotation mob    Prone tina UT STM/TrP release    Prone left C1 PA and STM    subocc release with left PA mob C1    Supine mid to upper cervical facet upglide SNAGs Cavitations right side   tina carpal mobs        Timed Minutes 60     Therapeutic Exercises    09881 Comments                       Timed Minutes      Therapy Education/Self Care 38860   Details: Reinforced flexibility   Medbridge Code:    Given Home Exercise Program and postural retraining   Progress: Reinforced   Who provided to: Patient   Level of understanding Verbalized   Timed Minutes         Total Timed Minutes:      60   mins  Total Visit Time:     60   mins    ASSESSMENT/PLAN     Assessment & Plan     Assessment  Impairments: abnormal muscle firing, abnormal muscle tone, abnormal or restricted ROM, activity intolerance, impaired physical strength, lacks appropriate home exercise program and pain with function  Assessment details: He feels overall he is better. His lower neck will still tighten but better. He feels like he is 85-90% improved and would like  the burning out of his neck. This worsens as the day progresses whether he works or not. He would benefit from continued PT    Clinical Progress: improved  Home Program Compliance: Yes  Treatment has included: therapeutic exercise, neuromuscular re-education, manual therapy, therapeutic activity, electrical stimulation, traction and dry needling  Progress toward previous goals: Partially Met  Recommendations: Continue as planned      Prognosis: good  Functional Limitations: uncomfortable because of pain, sitting and unable to perform repetitive tasks  Plan  Therapy options: will be seen for skilled physical therapy services  Planned modality interventions: dry needling, low level laser therapy, TENS and traction  Planned therapy interventions: manual therapy, neuromuscular re-education, spinal/joint mobilization, home exercise program, body mechanics training, stretching, therapeutic activities, strengthening, soft tissue mobilization and postural training  Frequency: 2x week  Duration in visits: 6  Treatment plan discussed with: patient  Plan details: Cont to focus on flexibility and mobs of CT junction and progress postural stability.         Goals   STG by: 3 weeks Comments Status   1. Improve mobility through thoracic and CT junction still quite tight progressing   2. Decreased muscle guarding  throughout neck and shoulder girdle Better but will start to tighten at work progressing             LTG by: 6 weeks     1. Reports no radicular symptoms for a week: this has decreased with splints and stretching Had several days of no numbness but today had some after using his hands more progressing   2. Full cervical rotation tina with no pain: 6-7/10 today Has good AROM but still some tightness at 2-3/10 progressing   3. Report no neck/shoulder pain except occasional minor twinges no more than 2-3/10 Pain 2-3/10 mostly tina lower neck and left upper cervical today progressing   4. Understands improved ergonomics for  work and HEP for flexibility and posture Focusing on postural flexibility and correction progressing                   Khalif Live, PT  Physical Therapist

## 2020-12-23 NOTE — PROGRESS NOTES
"{Progress Note/Recert/Reevaluation:47896::\"30 Day Progress Note\"}      Patient: Carlos Mena   : 1975  Referring practitioner: Judson Gonzalez MD  Date of Initial Visit: Type: THERAPY  Noted: 2020  Today's Date: 2020  Patient seen for 9 sessions  Visit Diagnoses:    ICD-10-CM ICD-9-CM   1. Radiculopathy, cervical  M54.12 723.4   2. Pain, neck  M54.2 723.1   3. Bilateral carpal tunnel syndrome  G56.03 354.0       Outcome Measure: {PT subjective questionnaires:06512}  Clinical Progress: {UNCHANGED, IMPROVED, WORSE:58978}  Home Program Compliance: {YES/NA/NO:95382}  Treatment has included: {PT interventions:03220}    Subjective   Objective   Assessment/Plan  Carlos was educated on ***. {He/She/They (CAPS):70960} {Reported/Demonstrated/Taught Back:76891} understanding of these instructions.    Progress toward previous goals: {all/partially/not met:37913}    Recommendations: {Reassess plan:97584}  Timeframe: { timeframe:1248815181}  Prognosis to achieve goals: {GOOD/FAIR/POOR:19062}    PT Signature: Khalif Live, PT  Thank you so much for letting us work with Carlos. I appreciate your letting us work with your patients. If you have any questions or concerns, please don't hesitate to contact me.    Based upon review of the patient's progress and continued therapy plan, it is my medical opinion that Carlos Mena should continue physical therapy treatment at [facility]  [address] [place] [id number] [phone].    PHYSICIAN: Judson Gonzalez MD______________________________________________DATE: ___________________                         "

## 2020-12-28 ENCOUNTER — TREATMENT (OUTPATIENT)
Dept: PHYSICAL THERAPY | Facility: CLINIC | Age: 45
End: 2020-12-28

## 2020-12-28 DIAGNOSIS — M54.2 PAIN, NECK: ICD-10-CM

## 2020-12-28 DIAGNOSIS — G56.03 BILATERAL CARPAL TUNNEL SYNDROME: ICD-10-CM

## 2020-12-28 DIAGNOSIS — M54.12 RADICULOPATHY, CERVICAL: Primary | ICD-10-CM

## 2020-12-28 PROCEDURE — 97140 MANUAL THERAPY 1/> REGIONS: CPT | Performed by: PHYSICAL THERAPIST

## 2020-12-28 NOTE — PROGRESS NOTES
Physical Therapy Treatment Note    Patient: Carlos Mena   : 1975  Referring practitioner: Judson Gonzalez MD  Date of Initial Visit: Type: THERAPY  Noted: 2020  Today's Date: 2020  Patient seen for 10 sessions  Visit Diagnoses:    ICD-10-CM ICD-9-CM   1. Radiculopathy, cervical  M54.12 723.4   2. Pain, neck  M54.2 723.1   3. Bilateral carpal tunnel syndrome  G56.03 354.0       SUBJECTIVE      Subjective Evaluation    History of Present Illness    Subjective comment: He says that hr started the day off bad when he walked into work and someone threw a package and twisted and hurt.Pain  Current pain ratin  Location: tight tina lower neck and upper neck         OBJECTIVE     Objective     Dry Needle Location [ (1-2 muscles)/ (3 or more muscles)]   Location Size Comment                      Manual Therapy                19184 Comments   Prone tina CT ext/rotation mob    Prone tina UT STM/TrP release    Prone left C1 PA and STM    subocc release with left PA mob C1 Pressure on left reproduced left forehead pain   Supine mid to upper cervical facet upglide SNAGs Cavitations 2-3 on each side   tina carpal mobs        Timed Minutes 60     Therapeutic Exercises    95425 Comments                       Timed Minutes      Therapy Education/Self Care 22417   Details: Reinforced flexibility   Medbridge Code:    Given Home Exercise Program and postural retraining   Progress: Reinforced   Who provided to: Patient   Level of understanding Verbalized   Timed Minutes         Total Timed Minutes:      60   mins  Total Visit Time:     60   mins    ASSESSMENT/PLAN     Assessment & Plan     Assessment  Assessment details: He had several cavitations today and felt better after. His left upper neck is still quite stiff and tender but overall, he is better without the radicular symptoms he was having.     Plan  Plan details: Cont to work on neck mobility and plan toward d/c vs adding extra visits if needed.           Goals   STG by: 3 weeks Comments Status   1. Improve mobility through thoracic and CT junction still quite tight progressing   2. Decreased muscle guarding  throughout neck and shoulder girdle Better but will start to tighten at work progressing             LTG by: 6 weeks     1. Reports no radicular symptoms for a week: this has decreased with splints and stretching Had several days of no numbness but today had some after using his hands more progressing   2. Full cervical rotation tina with no pain: 6-7/10 today Has good AROM but still some tightness at 2-3/10 progressing   3. Report no neck/shoulder pain except occasional minor twinges no more than 2-3/10 Pain 2-3/10 mostly tina lower neck and left upper cervical today progressing   4. Understands improved ergonomics for work and HEP for flexibility and posture Focusing on postural flexibility and correction progressing                   Khalif Live, PT  Physical Therapist

## 2020-12-30 ENCOUNTER — TREATMENT (OUTPATIENT)
Dept: PHYSICAL THERAPY | Facility: CLINIC | Age: 45
End: 2020-12-30

## 2020-12-30 DIAGNOSIS — G56.03 BILATERAL CARPAL TUNNEL SYNDROME: ICD-10-CM

## 2020-12-30 DIAGNOSIS — M54.12 RADICULOPATHY, CERVICAL: Primary | ICD-10-CM

## 2020-12-30 DIAGNOSIS — M54.2 PAIN, NECK: ICD-10-CM

## 2020-12-30 PROCEDURE — 97140 MANUAL THERAPY 1/> REGIONS: CPT | Performed by: PHYSICAL THERAPIST

## 2020-12-30 NOTE — PROGRESS NOTES
Physical Therapy Treatment Note    Patient: Carlos Mena              : 1975  Referring practitioner: Judson Gonzalez MD  Date of Initial Visit: Type: THERAPY  Noted: 2020  Today's Date: 2020  Patient seen for 11 sessions  Visit Diagnoses:    ICD-10-CM ICD-9-CM   1. Radiculopathy, cervical  M54.12 723.4   2. Pain, neck  M54.2 723.1   3. Bilateral carpal tunnel syndrome  G56.03 354.0       SUBJECTIVE      Subjective Evaluation    History of Present Illness    Subjective comment: He had a flare of neck pain with left radicular symptoms starting again. He attributes it to more mail. Pain  Current pain ratin  Location: tight tina lower neck and upper neck         OBJECTIVE     Objective     Dry Needle Location [ (1-2 muscles)/ (3 or more muscles)]   Location Size Comment                      Manual Therapy                [id number] Comments   Prone tina CT ext/rotation mob    Prone tina UT STM/TrP release    Prone left C1 PA and STM    subocc release with left PA mob C1 Pressure on left reproduced left forehead pain   Supine mid to upper cervical facet upglide SNAGs [place] on each side   tina carpal mobs        Timed Minutes 50     Therapeutic Exercises    85981 Comments                       Timed Minutes      Therapy Education/Self Care 23548   Details: Reinforced flexibility   Medbridge Code:    Given Home Exercise Program and postural retraining   Progress: Reinforced   Who provided to: Patient   Level of understanding Verbalized   Timed Minutes         Total Timed Minutes:      50   mins  Total Visit Time:     50   mins    ASSESSMENT/PLAN     Assessment & Plan     Assessment  Assessment details: He says he would like to be put on hold for now. He hopes his pain will diminish as the [place] season is over and he continues to do his HEP.     Plan  Plan details: Hold on PT for now. Continue with POC if he calls and has a flare. DC in a few weeks if he doesn't return.          Goals   STG  by: 3 weeks Comments Status   1. Improve mobility through thoracic and CT junction still quite tight but improved Partially met   2. Decreased muscle guarding  throughout neck and shoulder girdle Better but will start to tighten at work Partially met             LTG by: 6 weeks     1. Reports no radicular symptoms for a week: this has decreased with splints and stretching Had several days of no numbness but today had some radicular down his left arm Partially met   2. Full cervical rotation tina with no pain: Does have full ROM; Pain at 5/10; had been 2-3/10 Partially met   3. Report no neck/shoulder pain except occasional minor twinges no more than 2-3/10 Pain 2-3/10 mostly but flared up to 5/10 tina lower neck  Partially met   4. Understands improved ergonomics for work and HEP for flexibility and posture Focusing on postural flexibility and correction met                   Khalif Live, PT  Physical Therapist

## 2021-02-09 ENCOUNTER — TRANSCRIBE ORDERS (OUTPATIENT)
Dept: ADMINISTRATIVE | Facility: HOSPITAL | Age: 46
End: 2021-02-09

## 2021-02-09 ENCOUNTER — LAB (OUTPATIENT)
Dept: LAB | Facility: HOSPITAL | Age: 46
End: 2021-02-09

## 2021-02-09 DIAGNOSIS — Z00.00 ENCOUNTER FOR GENERAL ADULT MEDICAL EXAMINATION WITHOUT ABNORMAL FINDINGS: Primary | ICD-10-CM

## 2021-02-09 LAB
ALBUMIN SERPL-MCNC: 4.3 G/DL (ref 3.5–5.2)
ALBUMIN/GLOB SERPL: 1.3 G/DL
ALP SERPL-CCNC: 94 U/L (ref 39–117)
ALT SERPL W P-5'-P-CCNC: 45 U/L (ref 1–41)
ANION GAP SERPL CALCULATED.3IONS-SCNC: 10.3 MMOL/L (ref 5–15)
AST SERPL-CCNC: 31 U/L (ref 1–40)
BILIRUB SERPL-MCNC: 0.4 MG/DL (ref 0–1.2)
BUN SERPL-MCNC: 17 MG/DL (ref 6–20)
BUN/CREAT SERPL: 17 (ref 7–25)
CALCIUM SPEC-SCNC: 9.8 MG/DL (ref 8.6–10.5)
CHLORIDE SERPL-SCNC: 107 MMOL/L (ref 98–107)
CHOLEST SERPL-MCNC: 151 MG/DL (ref 0–200)
CO2 SERPL-SCNC: 25.7 MMOL/L (ref 22–29)
CREAT SERPL-MCNC: 1 MG/DL (ref 0.76–1.27)
DEPRECATED RDW RBC AUTO: 45.5 FL (ref 37–54)
ERYTHROCYTE [DISTWIDTH] IN BLOOD BY AUTOMATED COUNT: 13.3 % (ref 12.3–15.4)
GFR SERPL CREATININE-BSD FRML MDRD: 81 ML/MIN/1.73
GLOBULIN UR ELPH-MCNC: 3.3 GM/DL
GLUCOSE SERPL-MCNC: 94 MG/DL (ref 65–99)
HCT VFR BLD AUTO: 40.4 % (ref 37.5–51)
HDLC SERPL-MCNC: 32 MG/DL (ref 40–60)
HGB BLD-MCNC: 13.8 G/DL (ref 13–17.7)
LDLC SERPL CALC-MCNC: 82 MG/DL (ref 0–100)
LDLC/HDLC SERPL: 2.34 {RATIO}
MCH RBC QN AUTO: 32.2 PG (ref 26.6–33)
MCHC RBC AUTO-ENTMCNC: 34.2 G/DL (ref 31.5–35.7)
MCV RBC AUTO: 94.2 FL (ref 79–97)
PLATELET # BLD AUTO: 228 10*3/MM3 (ref 140–450)
PMV BLD AUTO: 10.9 FL (ref 6–12)
POTASSIUM SERPL-SCNC: 4.1 MMOL/L (ref 3.5–5.2)
PROT SERPL-MCNC: 7.6 G/DL (ref 6–8.5)
RBC # BLD AUTO: 4.29 10*6/MM3 (ref 4.14–5.8)
SODIUM SERPL-SCNC: 143 MMOL/L (ref 136–145)
T3FREE SERPL-MCNC: 2.46 PG/ML (ref 2–4.4)
T4 FREE SERPL-MCNC: 0.87 NG/DL (ref 0.93–1.7)
TRIGL SERPL-MCNC: 221 MG/DL (ref 0–150)
TSH SERPL DL<=0.05 MIU/L-ACNC: 0.76 UIU/ML (ref 0.27–4.2)
VLDLC SERPL-MCNC: 37 MG/DL (ref 5–40)
WBC # BLD AUTO: 6.96 10*3/MM3 (ref 3.4–10.8)

## 2021-02-09 PROCEDURE — 80053 COMPREHEN METABOLIC PANEL: CPT | Performed by: INTERNAL MEDICINE

## 2021-02-09 PROCEDURE — 80061 LIPID PANEL: CPT | Performed by: INTERNAL MEDICINE

## 2021-02-09 PROCEDURE — 84439 ASSAY OF FREE THYROXINE: CPT | Performed by: INTERNAL MEDICINE

## 2021-02-09 PROCEDURE — 84443 ASSAY THYROID STIM HORMONE: CPT | Performed by: INTERNAL MEDICINE

## 2021-02-09 PROCEDURE — 36415 COLL VENOUS BLD VENIPUNCTURE: CPT | Performed by: INTERNAL MEDICINE

## 2021-02-09 PROCEDURE — 84481 FREE ASSAY (FT-3): CPT | Performed by: INTERNAL MEDICINE

## 2021-02-09 PROCEDURE — 85027 COMPLETE CBC AUTOMATED: CPT | Performed by: INTERNAL MEDICINE

## 2021-02-18 ENCOUNTER — LAB (OUTPATIENT)
Dept: LAB | Facility: HOSPITAL | Age: 46
End: 2021-02-18

## 2021-02-18 LAB
25(OH)D3 SERPL-MCNC: 34.9 NG/ML
ALBUMIN UR-MCNC: 1.7 MG/DL
BASOPHILS # BLD AUTO: 0.07 10*3/MM3 (ref 0–0.2)
BASOPHILS NFR BLD AUTO: 1.1 % (ref 0–1.5)
CREAT UR-MCNC: 227.2 MG/DL
DEPRECATED RDW RBC AUTO: 44.8 FL (ref 37–54)
EOSINOPHIL # BLD AUTO: 0.18 10*3/MM3 (ref 0–0.4)
EOSINOPHIL NFR BLD AUTO: 2.8 % (ref 0.3–6.2)
ERYTHROCYTE [DISTWIDTH] IN BLOOD BY AUTOMATED COUNT: 13.2 % (ref 12.3–15.4)
HBA1C MFR BLD: 5.58 % (ref 4.8–5.6)
HCT VFR BLD AUTO: 39.9 % (ref 37.5–51)
HGB BLD-MCNC: 13.7 G/DL (ref 13–17.7)
IMM GRANULOCYTES # BLD AUTO: 0.03 10*3/MM3 (ref 0–0.05)
IMM GRANULOCYTES NFR BLD AUTO: 0.5 % (ref 0–0.5)
LYMPHOCYTES # BLD AUTO: 1.49 10*3/MM3 (ref 0.7–3.1)
LYMPHOCYTES NFR BLD AUTO: 23 % (ref 19.6–45.3)
MCH RBC QN AUTO: 32.2 PG (ref 26.6–33)
MCHC RBC AUTO-ENTMCNC: 34.3 G/DL (ref 31.5–35.7)
MCV RBC AUTO: 93.9 FL (ref 79–97)
MICROALBUMIN/CREAT UR: 7.5 MG/G
MONOCYTES # BLD AUTO: 0.58 10*3/MM3 (ref 0.1–0.9)
MONOCYTES NFR BLD AUTO: 9 % (ref 5–12)
NEUTROPHILS NFR BLD AUTO: 4.12 10*3/MM3 (ref 1.7–7)
NEUTROPHILS NFR BLD AUTO: 63.6 % (ref 42.7–76)
NRBC BLD AUTO-RTO: 0 /100 WBC (ref 0–0.2)
PLATELET # BLD AUTO: 251 10*3/MM3 (ref 140–450)
PMV BLD AUTO: 10.7 FL (ref 6–12)
RBC # BLD AUTO: 4.25 10*6/MM3 (ref 4.14–5.8)
TESTOST SERPL-MCNC: 373 NG/DL (ref 249–836)
VIT B12 BLD-MCNC: >2000 PG/ML (ref 211–946)
WBC # BLD AUTO: 6.47 10*3/MM3 (ref 3.4–10.8)

## 2021-02-18 PROCEDURE — 84439 ASSAY OF FREE THYROXINE: CPT | Performed by: INTERNAL MEDICINE

## 2021-02-18 PROCEDURE — 82570 ASSAY OF URINE CREATININE: CPT | Performed by: INTERNAL MEDICINE

## 2021-02-18 PROCEDURE — 82607 VITAMIN B-12: CPT | Performed by: INTERNAL MEDICINE

## 2021-02-18 PROCEDURE — 82306 VITAMIN D 25 HYDROXY: CPT | Performed by: INTERNAL MEDICINE

## 2021-02-18 PROCEDURE — 84305 ASSAY OF SOMATOMEDIN: CPT | Performed by: INTERNAL MEDICINE

## 2021-02-18 PROCEDURE — 85025 COMPLETE CBC W/AUTO DIFF WBC: CPT | Performed by: INTERNAL MEDICINE

## 2021-02-18 PROCEDURE — 36415 COLL VENOUS BLD VENIPUNCTURE: CPT | Performed by: INTERNAL MEDICINE

## 2021-02-18 PROCEDURE — 84410 TESTOSTERONE BIOAVAILABLE: CPT | Performed by: INTERNAL MEDICINE

## 2021-02-18 PROCEDURE — 84403 ASSAY OF TOTAL TESTOSTERONE: CPT | Performed by: INTERNAL MEDICINE

## 2021-02-18 PROCEDURE — 82043 UR ALBUMIN QUANTITATIVE: CPT | Performed by: INTERNAL MEDICINE

## 2021-02-18 PROCEDURE — 83036 HEMOGLOBIN GLYCOSYLATED A1C: CPT | Performed by: INTERNAL MEDICINE

## 2021-02-20 LAB — IGF-I SERPL-MCNC: 141 NG/ML (ref 84–270)

## 2021-02-24 LAB — T4 FREE SERPL DIALY-MCNC: 0.71 NG/DL

## 2021-02-25 LAB
TESTOST SERPL-MCNC: 318 NG/DL
TESTOSTERONE.FREE+WB MFR SERPL: 46.5 %
TESTOSTERONE.FREE+WB SERPL-MCNC: 148 NG/DL

## 2021-02-26 ENCOUNTER — TELEMEDICINE (OUTPATIENT)
Dept: ENDOCRINOLOGY | Facility: CLINIC | Age: 46
End: 2021-02-26

## 2021-02-26 DIAGNOSIS — E23.0 HYPOGONADOTROPIC HYPOGONADISM (HCC): ICD-10-CM

## 2021-02-26 DIAGNOSIS — R73.01 IMPAIRED FASTING GLUCOSE: ICD-10-CM

## 2021-02-26 DIAGNOSIS — E03.8 CENTRAL HYPOTHYROIDISM: Primary | ICD-10-CM

## 2021-02-26 DIAGNOSIS — E78.5 DYSLIPIDEMIA: ICD-10-CM

## 2021-02-26 PROBLEM — K76.0 FATTY LIVER: Status: ACTIVE | Noted: 2021-02-26

## 2021-02-26 PROCEDURE — 99214 OFFICE O/P EST MOD 30 MIN: CPT | Performed by: INTERNAL MEDICINE

## 2021-02-26 RX ORDER — LEVOTHYROXINE SODIUM 0.15 MG/1
150 TABLET ORAL DAILY
Qty: 90 TABLET | Refills: 3 | Status: SHIPPED | OUTPATIENT
Start: 2021-02-26 | End: 2022-04-01 | Stop reason: SDUPTHER

## 2021-02-26 NOTE — PROGRESS NOTES
Carlos Mena is a 45 y.o. male who presents for  evaluation of   Hypothyroidism                                          This was a Telehealth Encounter. Benefits and Disadvantages of a Telehealth Visit were discussed and accepted by patient. .  Patient agreed to receive service through Telehealth visit as patient is being compliant with social distancing recommendations imparted by CDC.     You have chosen to receive care through a telehealth visit.  Do you consent to use a video/audio connection for your medical care today? Yes            Referring provider   Primary Care Provider    Judson Gonzalez MD    45-year-old male comes for follow-up.    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    I reevaluated testosterone off Clomid and total testosterone was low but bioavailable testosterone is normal ( multiple times )  Last assessment in Feb 2021 is normal as well    In our workup with the proved low free T4 confirmed by dialysis and he has been on levothyroxine.    Additional workup for pituitary axis included a cosyntropin stimulation test, measurement of IGF-I, prolactin and all other pituitary hormones are normal.    Additional testing revealed impaired fasting glucose         Objective:     Physical Exam   Constitutional: He appears well-developed and well-nourished.   HENT:   Head: Normocephalic.   Pulmonary/Chest: Effort normal.   Musculoskeletal:         General: No deformity or edema.       Lab Review      Assessment/Plan       ICD-10-CM ICD-9-CM   1. Central hypothyroidism  E03.8 244.9   2. Impaired fasting glucose  R73.01 790.21   3. Hypogonadotropic hypogonadism (CMS/[facility])  E23.0 253.4   4. Dyslipidemia  E78.5 272.4           History of Hypogonadism and was on clomiphene    We retested and bioavailable was normal x 4    We had long discussion on  August 26, 2020 and decided to use only if needed         ===================    Central Hypothyroidism confirmed by Free T4 by dialysis with neg MRI     Lab Results   Component Value Date    TSH 0.760 02/09/2021    TSH 0.135 (L) 07/15/2020    TSH 0.676 02/12/2020       Lab Results   Component Value Date    FREET4 0.87 (L) 02/09/2021    FREET4 0.96 07/15/2020    FREET4 0.80 (L) 02/12/2020     LT4 at 25 , increased to 50 -75 - increase to 88 mcgs daily -112    Increase to 137 mcgs daily - increase to 150     TSH was never high before tx.     I never measured biotin but I will w next set of labs     ----    2018     Nl IGF1 and 2021  Nl prolactin    Nl  cosyntropin stim test     2018 - normal pituitary MRI    Testosterone nl last recheck Feb 2021    -----    Anemia    Do workup     Repeat nl with nl iron , b12, folic acid          ----    diabetes    Lab Results   Component Value Date    HGBA1C 5.58 02/18/2021    HGBA1C 5.70 (H) 07/15/2020    HGBA1C 5.80 (H) 02/12/2020     Lab Results   Component Value Date    MICROALBUR 1.7 02/18/2021    CREATININE 1.00 02/09/2021     Lab Results   Component Value Date    GLUCOSE 94 02/09/2021    CALCIUM 9.8 02/09/2021     02/09/2021    K 4.1 02/09/2021    CO2 25.7 02/09/2021     02/09/2021    BUN 17 02/09/2021    CREATININE 1.00 02/09/2021    EGFRIFNONA 81 02/09/2021    BCR 17.0 02/09/2021    ANIONGAP 10.3 02/09/2021       Intolerant to metformin     Can't tolerate rybelsus , aic normalized but can't tolerate     --    Using clonidine and losartan    Clonidine only at night , helps with profuse sweating    --  Obesity with prediabetes, hypertension, sleep apnea     No contrave due to depression   He will do lifestyle changes     --    Elevated LFT - most likely fatty liver     --      Orders Placed This Encounter   Procedures   • T4, Free   • CBC Auto Differential   • Comprehensive Metabolic Panel   • Vitamin D 25 Hydroxy   • Vitamin B12   • Hemoglobin A1c   • Lipid  Panel         A copy of my note was sent to Judson Gonzalez MD    Please see my above opinion and suggestions.       MDM     I spent 15 minutes reviewing patient electronic chart , reviewing medications , past history , active problems.   I provided advice regarding management of medical conditions, refilled prescriptions , ordered labs and arranged for future appointment.   Patient was advised to contact us if there were any unanswered questions or ongoing concerns.

## 2021-08-23 ENCOUNTER — LAB (OUTPATIENT)
Dept: LAB | Facility: HOSPITAL | Age: 46
End: 2021-08-23

## 2021-08-23 LAB
ALBUMIN SERPL-MCNC: 4.4 G/DL (ref 3.5–5.2)
ALBUMIN/GLOB SERPL: 1.4 G/DL
ALP SERPL-CCNC: 89 U/L (ref 39–117)
ALT SERPL W P-5'-P-CCNC: 21 U/L (ref 1–41)
ANION GAP SERPL CALCULATED.3IONS-SCNC: 12.6 MMOL/L (ref 5–15)
AST SERPL-CCNC: 12 U/L (ref 1–40)
BASOPHILS # BLD AUTO: 0.08 10*3/MM3 (ref 0–0.2)
BASOPHILS NFR BLD AUTO: 1.3 % (ref 0–1.5)
BILIRUB SERPL-MCNC: 0.5 MG/DL (ref 0–1.2)
BUN SERPL-MCNC: 14 MG/DL (ref 6–20)
BUN/CREAT SERPL: 12.6 (ref 7–25)
CALCIUM SPEC-SCNC: 9.5 MG/DL (ref 8.6–10.5)
CHLORIDE SERPL-SCNC: 106 MMOL/L (ref 98–107)
CHOLEST SERPL-MCNC: 141 MG/DL (ref 0–200)
CO2 SERPL-SCNC: 23.4 MMOL/L (ref 22–29)
CREAT SERPL-MCNC: 1.11 MG/DL (ref 0.76–1.27)
DEPRECATED RDW RBC AUTO: 42.5 FL (ref 37–54)
EOSINOPHIL # BLD AUTO: 0.21 10*3/MM3 (ref 0–0.4)
EOSINOPHIL NFR BLD AUTO: 3.3 % (ref 0.3–6.2)
ERYTHROCYTE [DISTWIDTH] IN BLOOD BY AUTOMATED COUNT: 12.9 % (ref 12.3–15.4)
GFR SERPL CREATININE-BSD FRML MDRD: 71 ML/MIN/1.73
GLOBULIN UR ELPH-MCNC: 3.2 GM/DL
GLUCOSE SERPL-MCNC: 100 MG/DL (ref 65–99)
HBA1C MFR BLD: 5.43 % (ref 4.8–5.6)
HCT VFR BLD AUTO: 39.1 % (ref 37.5–51)
HDLC SERPL-MCNC: 36 MG/DL (ref 40–60)
HGB BLD-MCNC: 13.4 G/DL (ref 13–17.7)
IMM GRANULOCYTES # BLD AUTO: 0.02 10*3/MM3 (ref 0–0.05)
IMM GRANULOCYTES NFR BLD AUTO: 0.3 % (ref 0–0.5)
LDLC SERPL CALC-MCNC: 80 MG/DL (ref 0–100)
LDLC/HDLC SERPL: 2.12 {RATIO}
LYMPHOCYTES # BLD AUTO: 1.42 10*3/MM3 (ref 0.7–3.1)
LYMPHOCYTES NFR BLD AUTO: 22.2 % (ref 19.6–45.3)
MCH RBC QN AUTO: 31.7 PG (ref 26.6–33)
MCHC RBC AUTO-ENTMCNC: 34.3 G/DL (ref 31.5–35.7)
MCV RBC AUTO: 92.4 FL (ref 79–97)
MONOCYTES # BLD AUTO: 0.58 10*3/MM3 (ref 0.1–0.9)
MONOCYTES NFR BLD AUTO: 9.1 % (ref 5–12)
NEUTROPHILS NFR BLD AUTO: 4.08 10*3/MM3 (ref 1.7–7)
NEUTROPHILS NFR BLD AUTO: 63.8 % (ref 42.7–76)
NRBC BLD AUTO-RTO: 0 /100 WBC (ref 0–0.2)
PLATELET # BLD AUTO: 259 10*3/MM3 (ref 140–450)
PMV BLD AUTO: 10.6 FL (ref 6–12)
POTASSIUM SERPL-SCNC: 4.1 MMOL/L (ref 3.5–5.2)
PROT SERPL-MCNC: 7.6 G/DL (ref 6–8.5)
RBC # BLD AUTO: 4.23 10*6/MM3 (ref 4.14–5.8)
SODIUM SERPL-SCNC: 142 MMOL/L (ref 136–145)
T4 FREE SERPL-MCNC: 1.2 NG/DL (ref 0.93–1.7)
TRIGL SERPL-MCNC: 144 MG/DL (ref 0–150)
TSH SERPL DL<=0.05 MIU/L-ACNC: 0.24 UIU/ML (ref 0.27–4.2)
VLDLC SERPL-MCNC: 25 MG/DL (ref 5–40)
WBC # BLD AUTO: 6.39 10*3/MM3 (ref 3.4–10.8)

## 2021-08-23 PROCEDURE — 80053 COMPREHEN METABOLIC PANEL: CPT | Performed by: INTERNAL MEDICINE

## 2021-08-23 PROCEDURE — 86376 MICROSOMAL ANTIBODY EACH: CPT | Performed by: INTERNAL MEDICINE

## 2021-08-23 PROCEDURE — 84591 ASSAY OF NOS VITAMIN: CPT | Performed by: INTERNAL MEDICINE

## 2021-08-23 PROCEDURE — 80061 LIPID PANEL: CPT | Performed by: INTERNAL MEDICINE

## 2021-08-23 PROCEDURE — 36415 COLL VENOUS BLD VENIPUNCTURE: CPT | Performed by: INTERNAL MEDICINE

## 2021-08-23 PROCEDURE — 85025 COMPLETE CBC W/AUTO DIFF WBC: CPT | Performed by: INTERNAL MEDICINE

## 2021-08-23 PROCEDURE — 84443 ASSAY THYROID STIM HORMONE: CPT | Performed by: INTERNAL MEDICINE

## 2021-08-23 PROCEDURE — 83036 HEMOGLOBIN GLYCOSYLATED A1C: CPT | Performed by: INTERNAL MEDICINE

## 2021-08-23 PROCEDURE — 84439 ASSAY OF FREE THYROXINE: CPT | Performed by: INTERNAL MEDICINE

## 2021-08-24 LAB — THYROPEROXIDASE AB SERPL-ACNC: <8 IU/ML (ref 0–34)

## 2021-08-25 ENCOUNTER — TELEPHONE (OUTPATIENT)
Dept: ENDOCRINOLOGY | Facility: CLINIC | Age: 46
End: 2021-08-25

## 2021-08-27 ENCOUNTER — OFFICE VISIT (OUTPATIENT)
Dept: ENDOCRINOLOGY | Facility: CLINIC | Age: 46
End: 2021-08-27

## 2021-08-27 VITALS
HEIGHT: 71 IN | WEIGHT: 261 LBS | SYSTOLIC BLOOD PRESSURE: 110 MMHG | OXYGEN SATURATION: 97 % | DIASTOLIC BLOOD PRESSURE: 70 MMHG | BODY MASS INDEX: 36.54 KG/M2 | HEART RATE: 66 BPM

## 2021-08-27 DIAGNOSIS — E55.9 VITAMIN D DEFICIENCY: ICD-10-CM

## 2021-08-27 DIAGNOSIS — E03.8 CENTRAL HYPOTHYROIDISM: Primary | ICD-10-CM

## 2021-08-27 DIAGNOSIS — E23.0 HYPOGONADOTROPIC HYPOGONADISM (HCC): ICD-10-CM

## 2021-08-27 DIAGNOSIS — R73.01 IMPAIRED FASTING GLUCOSE: ICD-10-CM

## 2021-08-27 PROCEDURE — 99214 OFFICE O/P EST MOD 30 MIN: CPT | Performed by: INTERNAL MEDICINE

## 2021-08-27 NOTE — PROGRESS NOTES
Carlos Mena is a 46 y.o. male who presents for  evaluation of   Hypothyroidism              Referring provider   Primary Care Provider    Judson Gonzalez MD    45-year-old male comes for follow-up.    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    I reevaluated testosterone off Clomid and total testosterone was low but bioavailable testosterone is normal ( multiple times )  Last assessment in Feb 2021 is normal as well    In our workup with the proved low free T4 confirmed by dialysis and he has been on levothyroxine.    Additional workup for pituitary axis included a cosyntropin stimulation test, measurement of IGF-I, prolactin and all other pituitary hormones are normal.    Additional testing revealed impaired fasting glucose         Objective:     Physical Exam   Constitutional: He appears well-developed and well-nourished.   HENT:   Head: Normocephalic.   Pulmonary/Chest: Effort normal.   Musculoskeletal:         General: No deformity or edema.       Lab Review      Assessment/Plan       ICD-10-CM ICD-9-CM   1. Central hypothyroidism  E03.8 244.9   2. Impaired fasting glucose  R73.01 790.21   3. Hypogonadotropic hypogonadism (CMS/[facility])  E23.0 253.4   4. Vitamin D deficiency  E55.9 268.9           History of Hypogonadism and was on clomiphene    We retested and bioavailable was normal x 4    We had long discussion on August 26, 2020 and decided to use only if needed         ===================    Central Hypothyroidism confirmed by Free T4 by dialysis with neg MRI     Lab Results   Component Value Date    TSH 0.238 (L) 08/23/2021    TSH 0.760 02/09/2021    TSH 0.135 (L) 07/15/2020       Lab Results   Component Value Date    FREET4 1.20 08/23/2021    FREET4 0.87 (L) 02/09/2021    FREET4 0.96 07/15/2020     LT4 at 25 , increased to 50 -75 - increase to 88  mcgs daily -112    Increase to 137 mcgs daily - increase to 150 -- decrease to 6.5 tabs per week     TSH was never high before tx, this time TSH is slightly low     Biotin pending this measurement     ----    2018     Nl IGF1 and 2021  Nl prolactin    Nl  cosyntropin stim test     2018 - normal pituitary MRI    Testosterone nl last recheck Feb 2021    -----    Anemia    Do workup     Repeat nl with nl iron , b12, folic acid          ----    diabetes    Lab Results   Component Value Date    HGBA1C 5.43 08/23/2021    HGBA1C 5.58 02/18/2021    HGBA1C 5.70 (H) 07/15/2020     Lab Results   Component Value Date    MICROALBUR 1.7 02/18/2021    CREATININE 1.11 08/23/2021     Lab Results   Component Value Date    GLUCOSE 100 (H) 08/23/2021    CALCIUM 9.5 08/23/2021     08/23/2021    K 4.1 08/23/2021    CO2 23.4 08/23/2021     08/23/2021    BUN 14 08/23/2021    CREATININE 1.11 08/23/2021    EGFRIFNONA 71 08/23/2021    BCR 12.6 08/23/2021    ANIONGAP 12.6 08/23/2021       Intolerant to metformin     Can't tolerate rybelsus , aic normalized but can't tolerate     --    Using clonidine and losartan    Clonidine only at night , helps with profuse sweating    --  Obesity with prediabetes, hypertension, sleep apnea     No contrave due to depression   He will do lifestyle changes , lost 30 lbs     --    Elevated LFT - most likely fatty liver - resolved with 30 lb weight loss    --  Vit D , on replacement at goal   No orders of the defined types were placed in this encounter.        A copy of my note was sent to Judson Gonzalez MD    Please see my above opinion and suggestions.       MDM     I spent 15 minutes reviewing patient electronic chart , reviewing medications , past history , active problems.   I provided advice regarding management of medical conditions, refilled prescriptions , ordered labs and arranged for future appointment.   Patient was advised to contact us if there were any unanswered questions or  ongoing concerns.

## 2021-09-01 LAB — BIOTIN SERPL-MCNC: 0.17 NG/ML (ref 0.05–0.83)

## 2022-01-04 ENCOUNTER — OFFICE VISIT (OUTPATIENT)
Dept: ENDOCRINOLOGY | Facility: CLINIC | Age: 47
End: 2022-01-04

## 2022-01-04 ENCOUNTER — LAB (OUTPATIENT)
Dept: LAB | Facility: HOSPITAL | Age: 47
End: 2022-01-04

## 2022-01-04 VITALS
BODY MASS INDEX: 36.3 KG/M2 | SYSTOLIC BLOOD PRESSURE: 120 MMHG | HEIGHT: 72 IN | WEIGHT: 268 LBS | OXYGEN SATURATION: 97 % | HEART RATE: 70 BPM | DIASTOLIC BLOOD PRESSURE: 80 MMHG

## 2022-01-04 DIAGNOSIS — R73.01 IMPAIRED FASTING GLUCOSE: ICD-10-CM

## 2022-01-04 DIAGNOSIS — E03.8 CENTRAL HYPOTHYROIDISM: Primary | ICD-10-CM

## 2022-01-04 DIAGNOSIS — E23.0 HYPOGONADOTROPIC HYPOGONADISM: ICD-10-CM

## 2022-01-04 LAB
ALBUMIN SERPL-MCNC: 4.7 G/DL (ref 3.5–5.2)
ALBUMIN/GLOB SERPL: 1.4 G/DL
ALP SERPL-CCNC: 91 U/L (ref 39–117)
ALT SERPL W P-5'-P-CCNC: 19 U/L (ref 1–41)
ANION GAP SERPL CALCULATED.3IONS-SCNC: 10 MMOL/L (ref 5–15)
AST SERPL-CCNC: 18 U/L (ref 1–40)
BASOPHILS # BLD AUTO: 0.06 10*3/MM3 (ref 0–0.2)
BASOPHILS NFR BLD AUTO: 0.9 % (ref 0–1.5)
BILIRUB SERPL-MCNC: 0.6 MG/DL (ref 0–1.2)
BUN SERPL-MCNC: 18 MG/DL (ref 6–20)
BUN/CREAT SERPL: 18.6 (ref 7–25)
CALCIUM SPEC-SCNC: 9.7 MG/DL (ref 8.6–10.5)
CHLORIDE SERPL-SCNC: 103 MMOL/L (ref 98–107)
CO2 SERPL-SCNC: 28 MMOL/L (ref 22–29)
CORTIS SERPL-MCNC: 16.23 MCG/DL
CREAT SERPL-MCNC: 0.97 MG/DL (ref 0.76–1.27)
DEPRECATED RDW RBC AUTO: 41.5 FL (ref 37–54)
EOSINOPHIL # BLD AUTO: 0.21 10*3/MM3 (ref 0–0.4)
EOSINOPHIL NFR BLD AUTO: 3.1 % (ref 0.3–6.2)
ERYTHROCYTE [DISTWIDTH] IN BLOOD BY AUTOMATED COUNT: 12.5 % (ref 12.3–15.4)
GFR SERPL CREATININE-BSD FRML MDRD: 83 ML/MIN/1.73
GLOBULIN UR ELPH-MCNC: 3.4 GM/DL
GLUCOSE SERPL-MCNC: 106 MG/DL (ref 65–99)
HBA1C MFR BLD: 5.2 % (ref 4.8–5.6)
HCT VFR BLD AUTO: 43.6 % (ref 37.5–51)
HGB BLD-MCNC: 14.7 G/DL (ref 13–17.7)
IMM GRANULOCYTES # BLD AUTO: 0.05 10*3/MM3 (ref 0–0.05)
IMM GRANULOCYTES NFR BLD AUTO: 0.7 % (ref 0–0.5)
LYMPHOCYTES # BLD AUTO: 1.56 10*3/MM3 (ref 0.7–3.1)
LYMPHOCYTES NFR BLD AUTO: 22.7 % (ref 19.6–45.3)
MCH RBC QN AUTO: 30.9 PG (ref 26.6–33)
MCHC RBC AUTO-ENTMCNC: 33.7 G/DL (ref 31.5–35.7)
MCV RBC AUTO: 91.8 FL (ref 79–97)
MONOCYTES # BLD AUTO: 0.79 10*3/MM3 (ref 0.1–0.9)
MONOCYTES NFR BLD AUTO: 11.5 % (ref 5–12)
NEUTROPHILS NFR BLD AUTO: 4.19 10*3/MM3 (ref 1.7–7)
NEUTROPHILS NFR BLD AUTO: 61.1 % (ref 42.7–76)
NRBC BLD AUTO-RTO: 0 /100 WBC (ref 0–0.2)
PLATELET # BLD AUTO: 248 10*3/MM3 (ref 140–450)
PMV BLD AUTO: 10 FL (ref 6–12)
POTASSIUM SERPL-SCNC: 4 MMOL/L (ref 3.5–5.2)
PROLACTIN SERPL-MCNC: 14.6 NG/ML (ref 4.04–15.2)
PROT SERPL-MCNC: 8.1 G/DL (ref 6–8.5)
RBC # BLD AUTO: 4.75 10*6/MM3 (ref 4.14–5.8)
SODIUM SERPL-SCNC: 141 MMOL/L (ref 136–145)
T4 FREE SERPL-MCNC: 0.88 NG/DL (ref 0.93–1.7)
TSH SERPL DL<=0.05 MIU/L-ACNC: 0.71 UIU/ML (ref 0.27–4.2)
WBC NRBC COR # BLD: 6.86 10*3/MM3 (ref 3.4–10.8)

## 2022-01-04 PROCEDURE — 85025 COMPLETE CBC W/AUTO DIFF WBC: CPT | Performed by: INTERNAL MEDICINE

## 2022-01-04 PROCEDURE — 84439 ASSAY OF FREE THYROXINE: CPT | Performed by: INTERNAL MEDICINE

## 2022-01-04 PROCEDURE — 99214 OFFICE O/P EST MOD 30 MIN: CPT | Performed by: INTERNAL MEDICINE

## 2022-01-04 PROCEDURE — 80053 COMPREHEN METABOLIC PANEL: CPT | Performed by: INTERNAL MEDICINE

## 2022-01-04 PROCEDURE — 84443 ASSAY THYROID STIM HORMONE: CPT | Performed by: INTERNAL MEDICINE

## 2022-01-04 PROCEDURE — 82024 ASSAY OF ACTH: CPT | Performed by: INTERNAL MEDICINE

## 2022-01-04 PROCEDURE — 83036 HEMOGLOBIN GLYCOSYLATED A1C: CPT | Performed by: INTERNAL MEDICINE

## 2022-01-04 PROCEDURE — 84410 TESTOSTERONE BIOAVAILABLE: CPT | Performed by: INTERNAL MEDICINE

## 2022-01-04 PROCEDURE — 84305 ASSAY OF SOMATOMEDIN: CPT | Performed by: INTERNAL MEDICINE

## 2022-01-04 PROCEDURE — 84146 ASSAY OF PROLACTIN: CPT | Performed by: INTERNAL MEDICINE

## 2022-01-04 PROCEDURE — 82533 TOTAL CORTISOL: CPT | Performed by: INTERNAL MEDICINE

## 2022-01-04 PROCEDURE — 36415 COLL VENOUS BLD VENIPUNCTURE: CPT | Performed by: INTERNAL MEDICINE

## 2022-01-04 NOTE — PROGRESS NOTES
Carlos Mena is a 46 y.o. male who presents for  evaluation of   Hypothyroidism              Referring provider   Primary Care Provider    Judson Gonzalez MD    46-year-old male comes for follow-up.    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    I reevaluated testosterone off Clomid and total testosterone was low but bioavailable testosterone is normal ( multiple times )  Last assessment in Feb 2021 is normal as well    In our workup with the proved low free T4 confirmed by dialysis and he has been on levothyroxine.    Additional workup for pituitary axis included a cosyntropin stimulation test, measurement of IGF-I, prolactin and all other pituitary hormones are normal.    Additional testing revealed impaired fasting glucose         Objective:     Physical Exam   Constitutional: He appears well-developed and well-nourished.   HENT:   Head: Normocephalic.   Pulmonary/Chest: Effort normal.   Musculoskeletal:         General: No deformity or edema.       Lab Review      Assessment/Plan       ICD-10-CM ICD-9-CM   1. Central hypothyroidism  E03.8 244.9   2. Impaired fasting glucose  R73.01 790.21   3. Hypogonadotropic hypogonadism (HCC)  E23.0 253.4           History of Hypogonadism and was on clomiphene    We retested and bioavailable was normal x 4    We had long discussion on August 26, 2020 and decided to use only if needed         ===================    Central Hypothyroidism confirmed by Free T4 by dialysis with neg MRI     Lab Results   Component Value Date    TSH 0.707 01/04/2022    TSH 0.238 (L) 08/23/2021    TSH 0.760 02/09/2021       Lab Results   Component Value Date    FREET4 0.88 (L) 01/04/2022    FREET4 1.20 08/23/2021    FREET4 0.87 (L) 02/09/2021     LT4 at 25 , increased to 50 -75 - increase to 88 mcgs daily -112    Increase to 137 mcgs  daily - increase to 150 -- decrease to 6.5 tabs per week - this time keep the same understanding that free t4 was low     TSH was never high before tx, this time TSH is slightly low - this time nl but free t4 low  I won't increase.   I don't want TSH to suppress        ----    2018     Nl IGF1 and 2021  Nl prolactin    Nl  cosyntropin stim test     2018 - normal pituitary MRI    Testosterone nl last recheck Feb 2021    -----    Anemia    Do workup     Repeat nl with nl iron , b12, folic acid          ----    diabetes    Lab Results   Component Value Date    HGBA1C 5.20 01/04/2022    HGBA1C 5.43 08/23/2021    HGBA1C 5.58 02/18/2021     Lab Results   Component Value Date    MICROALBUR 1.7 02/18/2021    CREATININE 0.97 01/04/2022     Lab Results   Component Value Date    GLUCOSE 106 (H) 01/04/2022    CALCIUM 9.7 01/04/2022     01/04/2022    K 4.0 01/04/2022    CO2 28.0 01/04/2022     01/04/2022    BUN 18 01/04/2022    CREATININE 0.97 01/04/2022    EGFRIFNONA 83 01/04/2022    BCR 18.6 01/04/2022    ANIONGAP 10.0 01/04/2022       Intolerant to metformin     Can't tolerate rybelsus , aic normalized but can't tolerate     --    Using clonidine and losartan    Clonidine only at night , helps with profuse sweating    --  Obesity with prediabetes, hypertension, sleep apnea     No contrave due to depression   He will do lifestyle changes , lost 30 lbs   Uses cpap     --    Elevated LFT - most likely fatty liver - resolved with 30 lb weight loss    --  Vit D , on replacement at goal   Orders Placed This Encounter   Procedures   • CBC Auto Differential     Order Specific Question:   Release to patient     Answer:   Immediate   • Comprehensive Metabolic Panel     Order Specific Question:   Release to patient     Answer:   Immediate   • Hemoglobin A1c     Order Specific Question:   Release to patient     Answer:   Immediate   • Lipid Panel   • TSH     Order Specific Question:   Release to patient     Answer:    Immediate   • T4, Free     Order Specific Question:   Release to patient     Answer:   Immediate   • Testosterone (Free & Total), LC / MS     Order Specific Question:   Release to patient     Answer:   Immediate         A copy of my note was sent to Judson Gonzalez MD    Please see my above opinion and suggestions.       MDM     I spent 15 minutes reviewing patient electronic chart , reviewing medications , past history , active problems.   I provided advice regarding management of medical conditions, refilled prescriptions , ordered labs and arranged for future appointment.   Patient was advised to contact us if there were any unanswered questions or ongoing concerns.

## 2022-01-05 LAB — ACTH PLAS-MCNC: 25.2 PG/ML (ref 7.2–63.3)

## 2022-01-06 LAB — IGF-I SERPL-MCNC: 168 NG/ML (ref 81–263)

## 2022-01-07 LAB
TESTOST SERPL-MCNC: 493 NG/DL (ref 264–916)
TESTOSTERONE.FREE+WB MFR SERPL: 29.3 % (ref 9–46)
TESTOSTERONE.FREE+WB SERPL-MCNC: 144.4 NG/DL (ref 40–250)

## 2022-04-01 DIAGNOSIS — E03.8 CENTRAL HYPOTHYROIDISM: Primary | ICD-10-CM

## 2022-04-01 RX ORDER — LEVOTHYROXINE SODIUM 0.15 MG/1
150 TABLET ORAL DAILY
Qty: 90 TABLET | Refills: 3 | Status: SHIPPED | OUTPATIENT
Start: 2022-04-01 | End: 2023-01-15

## 2022-07-07 ENCOUNTER — LAB (OUTPATIENT)
Dept: LAB | Facility: HOSPITAL | Age: 47
End: 2022-07-07

## 2022-07-07 ENCOUNTER — OFFICE VISIT (OUTPATIENT)
Dept: ENDOCRINOLOGY | Facility: CLINIC | Age: 47
End: 2022-07-07

## 2022-07-07 VITALS
WEIGHT: 272 LBS | OXYGEN SATURATION: 96 % | BODY MASS INDEX: 38.08 KG/M2 | HEIGHT: 71 IN | DIASTOLIC BLOOD PRESSURE: 80 MMHG | HEART RATE: 75 BPM | SYSTOLIC BLOOD PRESSURE: 110 MMHG

## 2022-07-07 DIAGNOSIS — E03.8 CENTRAL HYPOTHYROIDISM: Primary | ICD-10-CM

## 2022-07-07 DIAGNOSIS — R73.01 IMPAIRED FASTING GLUCOSE: ICD-10-CM

## 2022-07-07 DIAGNOSIS — E78.5 DYSLIPIDEMIA: ICD-10-CM

## 2022-07-07 DIAGNOSIS — E23.0 HYPOGONADOTROPIC HYPOGONADISM: ICD-10-CM

## 2022-07-07 LAB
ALBUMIN SERPL-MCNC: 4.9 G/DL (ref 3.5–5.2)
ALBUMIN/GLOB SERPL: 1.4 G/DL
ALP SERPL-CCNC: 95 U/L (ref 39–117)
ALT SERPL W P-5'-P-CCNC: 27 U/L (ref 1–41)
ANION GAP SERPL CALCULATED.3IONS-SCNC: 11 MMOL/L (ref 5–15)
AST SERPL-CCNC: 28 U/L (ref 1–40)
BASOPHILS # BLD AUTO: 0.08 10*3/MM3 (ref 0–0.2)
BASOPHILS NFR BLD AUTO: 1.3 % (ref 0–1.5)
BILIRUB SERPL-MCNC: 0.7 MG/DL (ref 0–1.2)
BUN SERPL-MCNC: 17 MG/DL (ref 6–20)
BUN/CREAT SERPL: 17.5 (ref 7–25)
CALCIUM SPEC-SCNC: 9.6 MG/DL (ref 8.6–10.5)
CHLORIDE SERPL-SCNC: 101 MMOL/L (ref 98–107)
CHOLEST SERPL-MCNC: 143 MG/DL (ref 0–200)
CO2 SERPL-SCNC: 24 MMOL/L (ref 22–29)
CORTIS AM PEAK SERPL-MCNC: 9.8 MCG/DL
CREAT SERPL-MCNC: 0.97 MG/DL (ref 0.76–1.27)
DEPRECATED RDW RBC AUTO: 40.2 FL (ref 37–54)
EGFRCR SERPLBLD CKD-EPI 2021: 97.5 ML/MIN/1.73
EOSINOPHIL # BLD AUTO: 0.2 10*3/MM3 (ref 0–0.4)
EOSINOPHIL NFR BLD AUTO: 3.1 % (ref 0.3–6.2)
ERYTHROCYTE [DISTWIDTH] IN BLOOD BY AUTOMATED COUNT: 12.1 % (ref 12.3–15.4)
GLOBULIN UR ELPH-MCNC: 3.4 GM/DL
GLUCOSE SERPL-MCNC: 103 MG/DL (ref 65–99)
HCT VFR BLD AUTO: 42.6 % (ref 37.5–51)
HDLC SERPL-MCNC: 37 MG/DL (ref 40–60)
HGB BLD-MCNC: 14.9 G/DL (ref 13–17.7)
IMM GRANULOCYTES # BLD AUTO: 0.03 10*3/MM3 (ref 0–0.05)
IMM GRANULOCYTES NFR BLD AUTO: 0.5 % (ref 0–0.5)
LDLC SERPL CALC-MCNC: 79 MG/DL (ref 0–100)
LDLC/HDLC SERPL: 2.04 {RATIO}
LYMPHOCYTES # BLD AUTO: 1.74 10*3/MM3 (ref 0.7–3.1)
LYMPHOCYTES NFR BLD AUTO: 27.3 % (ref 19.6–45.3)
MCH RBC QN AUTO: 32 PG (ref 26.6–33)
MCHC RBC AUTO-ENTMCNC: 35 G/DL (ref 31.5–35.7)
MCV RBC AUTO: 91.4 FL (ref 79–97)
MONOCYTES # BLD AUTO: 0.69 10*3/MM3 (ref 0.1–0.9)
MONOCYTES NFR BLD AUTO: 10.8 % (ref 5–12)
NEUTROPHILS NFR BLD AUTO: 3.64 10*3/MM3 (ref 1.7–7)
NEUTROPHILS NFR BLD AUTO: 57 % (ref 42.7–76)
NRBC BLD AUTO-RTO: 0 /100 WBC (ref 0–0.2)
PLATELET # BLD AUTO: 259 10*3/MM3 (ref 140–450)
PMV BLD AUTO: 10.7 FL (ref 6–12)
POTASSIUM SERPL-SCNC: 5 MMOL/L (ref 3.5–5.2)
PROLACTIN SERPL-MCNC: 6.24 NG/ML (ref 4.04–15.2)
PROT SERPL-MCNC: 8.3 G/DL (ref 6–8.5)
RBC # BLD AUTO: 4.66 10*6/MM3 (ref 4.14–5.8)
SODIUM SERPL-SCNC: 136 MMOL/L (ref 136–145)
T4 FREE SERPL-MCNC: 1.18 NG/DL (ref 0.93–1.7)
TRIGL SERPL-MCNC: 153 MG/DL (ref 0–150)
TSH SERPL DL<=0.05 MIU/L-ACNC: 0.29 UIU/ML (ref 0.27–4.2)
VLDLC SERPL-MCNC: 27 MG/DL (ref 5–40)
WBC NRBC COR # BLD: 6.38 10*3/MM3 (ref 3.4–10.8)

## 2022-07-07 PROCEDURE — 84305 ASSAY OF SOMATOMEDIN: CPT | Performed by: INTERNAL MEDICINE

## 2022-07-07 PROCEDURE — 82024 ASSAY OF ACTH: CPT | Performed by: INTERNAL MEDICINE

## 2022-07-07 PROCEDURE — 84439 ASSAY OF FREE THYROXINE: CPT | Performed by: INTERNAL MEDICINE

## 2022-07-07 PROCEDURE — 99214 OFFICE O/P EST MOD 30 MIN: CPT | Performed by: INTERNAL MEDICINE

## 2022-07-07 PROCEDURE — 80061 LIPID PANEL: CPT | Performed by: INTERNAL MEDICINE

## 2022-07-07 PROCEDURE — 36415 COLL VENOUS BLD VENIPUNCTURE: CPT | Performed by: INTERNAL MEDICINE

## 2022-07-07 PROCEDURE — 84403 ASSAY OF TOTAL TESTOSTERONE: CPT | Performed by: INTERNAL MEDICINE

## 2022-07-07 PROCEDURE — 83036 HEMOGLOBIN GLYCOSYLATED A1C: CPT | Performed by: INTERNAL MEDICINE

## 2022-07-07 PROCEDURE — 80050 GENERAL HEALTH PANEL: CPT | Performed by: INTERNAL MEDICINE

## 2022-07-07 PROCEDURE — 82533 TOTAL CORTISOL: CPT | Performed by: INTERNAL MEDICINE

## 2022-07-07 PROCEDURE — 84410 TESTOSTERONE BIOAVAILABLE: CPT | Performed by: INTERNAL MEDICINE

## 2022-07-07 PROCEDURE — 84402 ASSAY OF FREE TESTOSTERONE: CPT | Performed by: INTERNAL MEDICINE

## 2022-07-07 PROCEDURE — 84146 ASSAY OF PROLACTIN: CPT | Performed by: INTERNAL MEDICINE

## 2022-07-07 NOTE — PROGRESS NOTES
Calros Mena is a 46 y.o. male who presents for  evaluation of   Hypothyroidism              Referring provider   Primary Care Provider    Judson Gonzalez MD    46-year-old male comes for follow-up.    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    I reevaluated testosterone off Clomid and total testosterone was low but bioavailable testosterone is normal ( multiple times )  Last assessment in Feb 2021 is normal as well    In our workup with the proved low free T4 confirmed by dialysis and he has been on levothyroxine.    Additional workup for pituitary axis included a cosyntropin stimulation test, measurement of IGF-I, prolactin and all other pituitary hormones are normal.    Additional testing revealed impaired fasting glucose         Objective:     Physical Exam   Constitutional: He appears well-developed and well-nourished.   HENT:   Head: Normocephalic.   Pulmonary/Chest: Effort normal.   Musculoskeletal:         General: No deformity or edema.       Lab Review      Assessment & Plan       ICD-10-CM ICD-9-CM   1. Central hypothyroidism  E03.8 244.9   2. Hypogonadotropic hypogonadism (HCC)  E23.0 253.4           History of Hypogonadism and was on clomiphene    We retested and bioavailable was normal x 4    We had long discussion on August 26, 2020 and decided to use only if needed         ===================    Central Hypothyroidism confirmed by Free T4 by dialysis with neg MRI     Lab Results   Component Value Date    TSH 0.707 01/04/2022    TSH 0.238 (L) 08/23/2021    TSH 0.760 02/09/2021       Lab Results   Component Value Date    FREET4 0.88 (L) 01/04/2022    FREET4 1.20 08/23/2021    FREET4 0.87 (L) 02/09/2021     LT4 at 25 , increased to 50 -75 - increase to 88 mcgs daily -112    Increase to 137 mcgs daily - increase to 150 -- decrease to 6.5  tabs per week - this time keep the same understanding that free t4 was low     TSH was never high before tx, this time TSH is slightly low - this time nl but free t4 low  I won't increase.   I don't want TSH to suppress        ----    2018     Nl IGF1 and 2021  Nl prolactin    Nl  cosyntropin stim test     2018 - normal pituitary MRI    Testosterone nl last recheck Feb 2021    -----    Anemia    Do workup     Repeat nl with nl iron , b12, folic acid          ----    diabetes    Lab Results   Component Value Date    HGBA1C 5.20 01/04/2022    HGBA1C 5.43 08/23/2021    HGBA1C 5.58 02/18/2021     Lab Results   Component Value Date    MICROALBUR 1.7 02/18/2021    CREATININE 0.97 01/04/2022     Lab Results   Component Value Date    GLUCOSE 106 (H) 01/04/2022    CALCIUM 9.7 01/04/2022     01/04/2022    K 4.0 01/04/2022    CO2 28.0 01/04/2022     01/04/2022    BUN 18 01/04/2022    CREATININE 0.97 01/04/2022    EGFRIFNONA 83 01/04/2022    BCR 18.6 01/04/2022    ANIONGAP 10.0 01/04/2022       Intolerant to metformin     Can't tolerate rybelsus , aic normalized but can't tolerate     --    Using clonidine and losartan    Clonidine only at night , helps with profuse sweating    --  Obesity with prediabetes, hypertension, sleep apnea     No contrave due to depression   He will do lifestyle changes , lost 30 lbs   Uses cpap     --    Elevated LFT - most likely fatty liver - resolved with 30 lb weight loss    --  Vit D , on replacement at goal   No orders of the defined types were placed in this encounter.        A copy of my note was sent to Judson Gonzalez MD    Please see my above opinion and suggestions.       MDM     I spent 15 minutes reviewing patient electronic chart , reviewing medications , past history , active problems.   I provided advice regarding management of medical conditions, refilled prescriptions , ordered labs and arranged for future appointment.   Patient was advised to contact us if there were  any unanswered questions or ongoing concerns.

## 2022-07-08 LAB — HBA1C MFR BLD: 5.5 % (ref 4.8–5.6)

## 2022-07-09 LAB — ACTH PLAS-MCNC: 15.3 PG/ML (ref 7.2–63.3)

## 2022-07-10 LAB — IGF-I SERPL-MCNC: 181 NG/ML (ref 81–263)

## 2022-07-12 LAB
TESTOST SERPL-MCNC: 334 NG/DL (ref 264–916)
TESTOSTERONE.FREE+WB MFR SERPL: 38.3 % (ref 9–46)
TESTOSTERONE.FREE+WB SERPL-MCNC: 127.9 NG/DL (ref 40–250)

## 2022-07-13 LAB
TESTOST FREE SERPL-MCNC: 6.7 PG/ML (ref 6.8–21.5)
TESTOST SERPL-MCNC: 337.6 NG/DL (ref 264–916)

## 2022-07-14 LAB — T4 FREE SERPL DIALY-MCNC: 1.2 NG/DL

## 2023-01-05 DIAGNOSIS — E55.9 VITAMIN D DEFICIENCY: ICD-10-CM

## 2023-01-05 DIAGNOSIS — E78.5 DYSLIPIDEMIA: ICD-10-CM

## 2023-01-05 DIAGNOSIS — E23.0 HYPOGONADOTROPIC HYPOGONADISM: ICD-10-CM

## 2023-01-05 DIAGNOSIS — E03.8 CENTRAL HYPOTHYROIDISM: Primary | ICD-10-CM

## 2023-01-05 DIAGNOSIS — R73.01 IMPAIRED FASTING GLUCOSE: ICD-10-CM

## 2023-01-06 ENCOUNTER — LAB (OUTPATIENT)
Dept: LAB | Facility: HOSPITAL | Age: 48
End: 2023-01-06
Payer: COMMERCIAL

## 2023-01-06 DIAGNOSIS — E55.9 VITAMIN D DEFICIENCY: ICD-10-CM

## 2023-01-06 DIAGNOSIS — E78.5 DYSLIPIDEMIA: ICD-10-CM

## 2023-01-06 DIAGNOSIS — E03.8 CENTRAL HYPOTHYROIDISM: ICD-10-CM

## 2023-01-06 DIAGNOSIS — R73.01 IMPAIRED FASTING GLUCOSE: ICD-10-CM

## 2023-01-06 DIAGNOSIS — E23.0 HYPOGONADOTROPIC HYPOGONADISM: ICD-10-CM

## 2023-01-06 LAB
ALBUMIN SERPL-MCNC: 4.5 G/DL (ref 3.5–5.2)
ALBUMIN/GLOB SERPL: 1.4 G/DL
ALP SERPL-CCNC: 88 U/L (ref 39–117)
ALT SERPL W P-5'-P-CCNC: 48 U/L (ref 1–41)
ANION GAP SERPL CALCULATED.3IONS-SCNC: 10 MMOL/L (ref 5–15)
AST SERPL-CCNC: 31 U/L (ref 1–40)
BASOPHILS # BLD AUTO: 0.07 10*3/MM3 (ref 0–0.2)
BASOPHILS NFR BLD AUTO: 1.2 % (ref 0–1.5)
BILIRUB SERPL-MCNC: 0.4 MG/DL (ref 0–1.2)
BUN SERPL-MCNC: 11 MG/DL (ref 6–20)
BUN/CREAT SERPL: 11.5 (ref 7–25)
CALCIUM SPEC-SCNC: 9.6 MG/DL (ref 8.6–10.5)
CHLORIDE SERPL-SCNC: 104 MMOL/L (ref 98–107)
CHOLEST SERPL-MCNC: 179 MG/DL (ref 0–200)
CO2 SERPL-SCNC: 26 MMOL/L (ref 22–29)
CREAT SERPL-MCNC: 0.96 MG/DL (ref 0.76–1.27)
DEPRECATED RDW RBC AUTO: 40.8 FL (ref 37–54)
EGFRCR SERPLBLD CKD-EPI 2021: 98.1 ML/MIN/1.73
EOSINOPHIL # BLD AUTO: 0.21 10*3/MM3 (ref 0–0.4)
EOSINOPHIL NFR BLD AUTO: 3.5 % (ref 0.3–6.2)
ERYTHROCYTE [DISTWIDTH] IN BLOOD BY AUTOMATED COUNT: 12.4 % (ref 12.3–15.4)
GLOBULIN UR ELPH-MCNC: 3.3 GM/DL
GLUCOSE SERPL-MCNC: 93 MG/DL (ref 65–99)
HBA1C MFR BLD: 5.3 % (ref 4.8–5.6)
HCT VFR BLD AUTO: 38.6 % (ref 37.5–51)
HDLC SERPL-MCNC: 34 MG/DL (ref 40–60)
HGB BLD-MCNC: 13.4 G/DL (ref 13–17.7)
IMM GRANULOCYTES # BLD AUTO: 0.04 10*3/MM3 (ref 0–0.05)
IMM GRANULOCYTES NFR BLD AUTO: 0.7 % (ref 0–0.5)
LDLC SERPL CALC-MCNC: 105 MG/DL (ref 0–100)
LDLC/HDLC SERPL: 2.89 {RATIO}
LYMPHOCYTES # BLD AUTO: 1.33 10*3/MM3 (ref 0.7–3.1)
LYMPHOCYTES NFR BLD AUTO: 22.3 % (ref 19.6–45.3)
MCH RBC QN AUTO: 31.7 PG (ref 26.6–33)
MCHC RBC AUTO-ENTMCNC: 34.7 G/DL (ref 31.5–35.7)
MCV RBC AUTO: 91.3 FL (ref 79–97)
MONOCYTES # BLD AUTO: 0.62 10*3/MM3 (ref 0.1–0.9)
MONOCYTES NFR BLD AUTO: 10.4 % (ref 5–12)
NEUTROPHILS NFR BLD AUTO: 3.7 10*3/MM3 (ref 1.7–7)
NEUTROPHILS NFR BLD AUTO: 61.9 % (ref 42.7–76)
NRBC BLD AUTO-RTO: 0 /100 WBC (ref 0–0.2)
PLATELET # BLD AUTO: 252 10*3/MM3 (ref 140–450)
PMV BLD AUTO: 10.3 FL (ref 6–12)
POTASSIUM SERPL-SCNC: 4 MMOL/L (ref 3.5–5.2)
PROT SERPL-MCNC: 7.8 G/DL (ref 6–8.5)
RBC # BLD AUTO: 4.23 10*6/MM3 (ref 4.14–5.8)
SODIUM SERPL-SCNC: 140 MMOL/L (ref 136–145)
TESTOST SERPL-MCNC: 315 NG/DL (ref 249–836)
TRIGL SERPL-MCNC: 233 MG/DL (ref 0–150)
TSH SERPL DL<=0.05 MIU/L-ACNC: 0.97 UIU/ML (ref 0.27–4.2)
VIT B12 BLD-MCNC: 1888 PG/ML (ref 211–946)
VLDLC SERPL-MCNC: 40 MG/DL (ref 5–40)
WBC NRBC COR # BLD: 5.97 10*3/MM3 (ref 3.4–10.8)

## 2023-01-06 PROCEDURE — 83036 HEMOGLOBIN GLYCOSYLATED A1C: CPT

## 2023-01-06 PROCEDURE — 80050 GENERAL HEALTH PANEL: CPT

## 2023-01-06 PROCEDURE — 84403 ASSAY OF TOTAL TESTOSTERONE: CPT

## 2023-01-06 PROCEDURE — 84153 ASSAY OF PSA TOTAL: CPT

## 2023-01-06 PROCEDURE — 84439 ASSAY OF FREE THYROXINE: CPT

## 2023-01-06 PROCEDURE — 82607 VITAMIN B-12: CPT

## 2023-01-06 PROCEDURE — 80061 LIPID PANEL: CPT

## 2023-01-06 PROCEDURE — 84305 ASSAY OF SOMATOMEDIN: CPT

## 2023-01-08 LAB
PSA SERPL-MCNC: 0.7 NG/ML (ref 0–4)
REFLEX: NORMAL

## 2023-01-09 ENCOUNTER — OFFICE VISIT (OUTPATIENT)
Dept: ENDOCRINOLOGY | Facility: CLINIC | Age: 48
End: 2023-01-09
Payer: COMMERCIAL

## 2023-01-09 VITALS
HEIGHT: 71 IN | OXYGEN SATURATION: 96 % | DIASTOLIC BLOOD PRESSURE: 80 MMHG | WEIGHT: 295 LBS | SYSTOLIC BLOOD PRESSURE: 130 MMHG | HEART RATE: 73 BPM | BODY MASS INDEX: 41.3 KG/M2

## 2023-01-09 DIAGNOSIS — E55.9 VITAMIN D DEFICIENCY: ICD-10-CM

## 2023-01-09 DIAGNOSIS — E03.8 CENTRAL HYPOTHYROIDISM: Primary | ICD-10-CM

## 2023-01-09 DIAGNOSIS — R73.01 IMPAIRED FASTING GLUCOSE: ICD-10-CM

## 2023-01-09 DIAGNOSIS — E78.5 DYSLIPIDEMIA: ICD-10-CM

## 2023-01-09 DIAGNOSIS — E23.0 HYPOGONADOTROPIC HYPOGONADISM: ICD-10-CM

## 2023-01-09 PROCEDURE — 99214 OFFICE O/P EST MOD 30 MIN: CPT | Performed by: INTERNAL MEDICINE

## 2023-01-09 NOTE — PROGRESS NOTES
Carlos Mena is a 47 y.o. male who presents for  evaluation of   Hypothyroidism              Referring provider   Primary Care Provider    Judson Gonzalez MD    47 y.o. 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    I reevaluated testosterone off Clomid and total testosterone was low but bioavailable testosterone was normal   He is interested in replacement since last level was only 300        In our workup we the proved low free T4 confirmed by dialysis and he has been on levothyroxine.    Additional workup for pituitary axis included a cosyntropin stimulation test, measurement of IGF-I, prolactin and all other pituitary hormones are normal.    Additional testing revealed impaired fasting glucose    Has gained some weight since last appt  Son dx w lymphoma but now in remission            Objective:     Physical Exam   Constitutional: He appears well-developed and well-nourished.   HENT:   Head: Normocephalic.   Pulmonary/Chest: Effort normal.   Musculoskeletal:         General: No deformity or edema.       Lab Review      Assessment & Plan       ICD-10-CM ICD-9-CM   1. Central hypothyroidism  E03.8 244.9   2. Hypogonadotropic hypogonadism (HCC)  E23.0 253.4   3. Impaired fasting glucose  R73.01 790.21   4. Dyslipidemia  E78.5 272.4   5. Vitamin D deficiency  E55.9 268.9           History of Hypogonadism and was on clomiphene    We retested and bioavailable was normal x 4    We had long discussion  and decided to use only if needed         ===================    Central Hypothyroidism confirmed by Free T4 by dialysis with neg MRI     Lab Results   Component Value Date    TSH 0.970 01/06/2023    TSH 0.292 07/07/2022    TSH 0.707 01/04/2022       Lab Results   Component Value Date    FREET4 1.18 07/07/2022    FREET4 0.88 (L) 01/04/2022    FREET4 1.20  08/23/2021     LT4 at 25 , increased to 50 -75 - increase to 88 mcgs daily -112    Increase to 137 mcgs daily - increase to 150 -- decrease to 6.5 tabs per week - this time keep the same understanding that free t4 was low     TSH was never high before tx, this time TSH is slightly low - this time nl but free t4 low  I won't increase.   I don't want TSH to suppress        ----    2018     Nl IGF1 and 2021  Nl prolactin    Nl  cosyntropin stim test     2018 - normal pituitary MRI    --    Hypogonadism central  Last test 300   Start clomid   25 mg every other day     -----    Anemia    Do workup     Repeat nl with nl iron , b12, folic acid      Lab Results   Component Value Date    WBC 5.97 01/06/2023    HGB 13.4 01/06/2023    HCT 38.6 01/06/2023    MCV 91.3 01/06/2023     01/06/2023     Lab Results   Component Value Date    IRON 85 12/05/2018    TIBC 293 12/05/2018           ----  Metabolic sx     Lab Results   Component Value Date    HGBA1C 5.30 01/06/2023    HGBA1C 5.50 07/07/2022    HGBA1C 5.20 01/04/2022     Lab Results   Component Value Date    MICROALBUR 1.7 02/18/2021    CREATININE 0.96 01/06/2023     Lab Results   Component Value Date    GLUCOSE 93 01/06/2023    CALCIUM 9.6 01/06/2023     01/06/2023    K 4.0 01/06/2023    CO2 26.0 01/06/2023     01/06/2023    BUN 11 01/06/2023    CREATININE 0.96 01/06/2023    EGFRIFNONA 83 01/04/2022    BCR 11.5 01/06/2023    ANIONGAP 10.0 01/06/2023       Intolerant to metformin     Can't tolerate rybelsus , aic normalized but can't tolerate     --  /80   Pulse 73   Ht 180.3 cm (71\")   Wt 134 kg (295 lb)   SpO2 96%   BMI 41.14 kg/m²     Using clonidine and losartan    Clonidine only at night , helps with profuse sweating    --  Obesity with prediabetes, hypertension, sleep apnea     No contrave due to depression   He will do lifestyle changes , lost 30 lbs   Uses cpap     --    Elevated LFT - most likely fatty liver - resolved with 30 lb weight  loss    --  Vit D , on replacement at goal   No orders of the defined types were placed in this encounter.        A copy of my note was sent to Judson Gonzalez MD    Please see my above opinion and suggestions.       MDM     I spent 15 minutes reviewing patient electronic chart , reviewing medications , past history , active problems.   I provided advice regarding management of medical conditions, refilled prescriptions , ordered labs and arranged for future appointment.   Patient was advised to contact us if there were any unanswered questions or ongoing concerns.

## 2023-01-10 LAB — IGF-I SERPL-MCNC: 146 NG/ML (ref 81–263)

## 2023-01-14 LAB — T4 FREE SERPL DIALY-MCNC: 0.79 NG/DL

## 2023-01-15 RX ORDER — LEVOTHYROXINE SODIUM 175 UG/1
175 TABLET ORAL DAILY
Qty: 90 TABLET | Refills: 3 | Status: SHIPPED | OUTPATIENT
Start: 2023-01-15 | End: 2024-01-15

## 2023-05-12 ENCOUNTER — LAB (OUTPATIENT)
Dept: LAB | Facility: HOSPITAL | Age: 48
End: 2023-05-12
Payer: COMMERCIAL

## 2023-05-12 DIAGNOSIS — E03.8 CENTRAL HYPOTHYROIDISM: ICD-10-CM

## 2023-05-12 DIAGNOSIS — E78.5 DYSLIPIDEMIA: ICD-10-CM

## 2023-05-12 DIAGNOSIS — E55.9 VITAMIN D DEFICIENCY: ICD-10-CM

## 2023-05-12 DIAGNOSIS — E23.0 HYPOGONADOTROPIC HYPOGONADISM: ICD-10-CM

## 2023-05-12 DIAGNOSIS — R73.01 IMPAIRED FASTING GLUCOSE: ICD-10-CM

## 2023-05-12 LAB
ALBUMIN SERPL-MCNC: 4.5 G/DL (ref 3.5–5.2)
ALBUMIN UR-MCNC: 1.2 MG/DL
ALBUMIN/GLOB SERPL: 1.6 G/DL
ALP SERPL-CCNC: 79 U/L (ref 39–117)
ALT SERPL W P-5'-P-CCNC: 30 U/L (ref 1–41)
ANION GAP SERPL CALCULATED.3IONS-SCNC: 8 MMOL/L (ref 5–15)
AST SERPL-CCNC: 23 U/L (ref 1–40)
BASOPHILS # BLD AUTO: 0.06 10*3/MM3 (ref 0–0.2)
BASOPHILS NFR BLD AUTO: 1 % (ref 0–1.5)
BILIRUB SERPL-MCNC: 0.5 MG/DL (ref 0–1.2)
BUN SERPL-MCNC: 13 MG/DL (ref 6–20)
BUN/CREAT SERPL: 10.8 (ref 7–25)
CALCIUM SPEC-SCNC: 9.2 MG/DL (ref 8.6–10.5)
CHLORIDE SERPL-SCNC: 104 MMOL/L (ref 98–107)
CHOLEST SERPL-MCNC: 124 MG/DL (ref 0–200)
CO2 SERPL-SCNC: 28 MMOL/L (ref 22–29)
CREAT SERPL-MCNC: 1.2 MG/DL (ref 0.76–1.27)
CREAT UR-MCNC: 175.9 MG/DL
DEPRECATED RDW RBC AUTO: 42.6 FL (ref 37–54)
EGFRCR SERPLBLD CKD-EPI 2021: 75.1 ML/MIN/1.73
EOSINOPHIL # BLD AUTO: 0.22 10*3/MM3 (ref 0–0.4)
EOSINOPHIL NFR BLD AUTO: 3.5 % (ref 0.3–6.2)
ERYTHROCYTE [DISTWIDTH] IN BLOOD BY AUTOMATED COUNT: 12.5 % (ref 12.3–15.4)
GLOBULIN UR ELPH-MCNC: 2.9 GM/DL
GLUCOSE SERPL-MCNC: 120 MG/DL (ref 65–99)
HBA1C MFR BLD: 5.6 % (ref 4.8–5.6)
HCT VFR BLD AUTO: 41.9 % (ref 37.5–51)
HDLC SERPL-MCNC: 27 MG/DL (ref 40–60)
HGB BLD-MCNC: 13.9 G/DL (ref 13–17.7)
IMM GRANULOCYTES # BLD AUTO: 0.04 10*3/MM3 (ref 0–0.05)
IMM GRANULOCYTES NFR BLD AUTO: 0.6 % (ref 0–0.5)
LDLC SERPL CALC-MCNC: 70 MG/DL (ref 0–100)
LDLC/HDLC SERPL: 2.46 {RATIO}
LYMPHOCYTES # BLD AUTO: 1.44 10*3/MM3 (ref 0.7–3.1)
LYMPHOCYTES NFR BLD AUTO: 22.9 % (ref 19.6–45.3)
MCH RBC QN AUTO: 30.8 PG (ref 26.6–33)
MCHC RBC AUTO-ENTMCNC: 33.2 G/DL (ref 31.5–35.7)
MCV RBC AUTO: 92.7 FL (ref 79–97)
MICROALBUMIN/CREAT UR: 6.8 MG/G
MONOCYTES # BLD AUTO: 0.63 10*3/MM3 (ref 0.1–0.9)
MONOCYTES NFR BLD AUTO: 10 % (ref 5–12)
NEUTROPHILS NFR BLD AUTO: 3.89 10*3/MM3 (ref 1.7–7)
NEUTROPHILS NFR BLD AUTO: 62 % (ref 42.7–76)
NRBC BLD AUTO-RTO: 0 /100 WBC (ref 0–0.2)
PLATELET # BLD AUTO: 215 10*3/MM3 (ref 140–450)
PMV BLD AUTO: 9.9 FL (ref 6–12)
POTASSIUM SERPL-SCNC: 3.9 MMOL/L (ref 3.5–5.2)
PROT SERPL-MCNC: 7.4 G/DL (ref 6–8.5)
RBC # BLD AUTO: 4.52 10*6/MM3 (ref 4.14–5.8)
SODIUM SERPL-SCNC: 140 MMOL/L (ref 136–145)
T4 FREE SERPL-MCNC: 0.91 NG/DL (ref 0.93–1.7)
TRIGL SERPL-MCNC: 153 MG/DL (ref 0–150)
TSH SERPL DL<=0.05 MIU/L-ACNC: 0.44 UIU/ML (ref 0.27–4.2)
VLDLC SERPL-MCNC: 27 MG/DL (ref 5–40)
WBC NRBC COR # BLD: 6.28 10*3/MM3 (ref 3.4–10.8)

## 2023-05-12 PROCEDURE — 82570 ASSAY OF URINE CREATININE: CPT

## 2023-05-12 PROCEDURE — 84439 ASSAY OF FREE THYROXINE: CPT

## 2023-05-12 PROCEDURE — 83036 HEMOGLOBIN GLYCOSYLATED A1C: CPT

## 2023-05-12 PROCEDURE — 80061 LIPID PANEL: CPT

## 2023-05-12 PROCEDURE — 84153 ASSAY OF PSA TOTAL: CPT

## 2023-05-12 PROCEDURE — 36415 COLL VENOUS BLD VENIPUNCTURE: CPT

## 2023-05-12 PROCEDURE — 80050 GENERAL HEALTH PANEL: CPT

## 2023-05-12 PROCEDURE — 84403 ASSAY OF TOTAL TESTOSTERONE: CPT

## 2023-05-12 PROCEDURE — 82043 UR ALBUMIN QUANTITATIVE: CPT

## 2023-05-13 LAB
PSA SERPL-MCNC: 1.3 NG/ML (ref 0–4)
REFLEX: NORMAL

## 2023-05-15 ENCOUNTER — OFFICE VISIT (OUTPATIENT)
Dept: ENDOCRINOLOGY | Facility: CLINIC | Age: 48
End: 2023-05-15
Payer: COMMERCIAL

## 2023-05-15 VITALS
OXYGEN SATURATION: 99 % | BODY MASS INDEX: 41.86 KG/M2 | DIASTOLIC BLOOD PRESSURE: 90 MMHG | HEART RATE: 65 BPM | HEIGHT: 71 IN | SYSTOLIC BLOOD PRESSURE: 138 MMHG | WEIGHT: 299 LBS

## 2023-05-15 DIAGNOSIS — E78.5 DYSLIPIDEMIA: ICD-10-CM

## 2023-05-15 DIAGNOSIS — E11.69 TYPE 2 DIABETES MELLITUS WITH OBESITY: ICD-10-CM

## 2023-05-15 DIAGNOSIS — E23.0 HYPOGONADOTROPIC HYPOGONADISM: ICD-10-CM

## 2023-05-15 DIAGNOSIS — E03.8 CENTRAL HYPOTHYROIDISM: Primary | ICD-10-CM

## 2023-05-15 DIAGNOSIS — E55.9 VITAMIN D DEFICIENCY: ICD-10-CM

## 2023-05-15 DIAGNOSIS — E66.9 TYPE 2 DIABETES MELLITUS WITH OBESITY: ICD-10-CM

## 2023-05-15 PROCEDURE — 99214 OFFICE O/P EST MOD 30 MIN: CPT | Performed by: INTERNAL MEDICINE

## 2023-05-15 RX ORDER — TIRZEPATIDE 2.5 MG/.5ML
2.5 INJECTION, SOLUTION SUBCUTANEOUS
Qty: 2 ML | Refills: 0 | Status: SHIPPED | OUTPATIENT
Start: 2023-05-15 | End: 2023-06-06

## 2023-05-15 RX ORDER — LEVOTHYROXINE SODIUM 0.2 MG/1
200 TABLET ORAL DAILY
Qty: 30 TABLET | Refills: 11 | Status: SHIPPED | OUTPATIENT
Start: 2023-05-15 | End: 2024-05-14

## 2023-05-15 RX ORDER — METFORMIN HYDROCHLORIDE 500 MG/1
500 TABLET, EXTENDED RELEASE ORAL
Qty: 30 TABLET | Refills: 11 | Status: SHIPPED | OUTPATIENT
Start: 2023-05-15 | End: 2024-05-14

## 2023-05-15 NOTE — PROGRESS NOTES
Carlos Mena is a 47 y.o. male who presents for  evaluation of   Hypothyroidism              Referring provider   Primary Care Provider    Judson Gonzalez MD    47 y.o. 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    I reevaluated testosterone off Clomid and total testosterone was low but bioavailable testosterone was normal   He is interested in replacement since last level was only 300        In our workup we the proved low free T4 confirmed by dialysis and he has been on levothyroxine.    Additional workup for pituitary axis included a cosyntropin stimulation test, measurement of IGF-I, prolactin and all other pituitary hormones are normal.    Additional testing revealed impaired fasting glucose    Has gained some weight since last appt  Son dx w lymphoma but now in remission            Objective:     Physical Exam   Constitutional: He appears well-developed and well-nourished.   HENT:   Head: Normocephalic.   Pulmonary/Chest: Effort normal.   Musculoskeletal:         General: No deformity or edema.       Lab Review      Assessment & Plan       ICD-10-CM ICD-9-CM   1. Central hypothyroidism  E03.8 244.9   2. Hypogonadotropic hypogonadism  E23.0 253.4   3. Type 2 diabetes mellitus with obesity  E11.69 250.00    E66.9 278.00   4. Dyslipidemia  E78.5 272.4   5. Vitamin D deficiency  E55.9 268.9          ===================    Central Hypothyroidism confirmed by Free T4 by dialysis with neg MRI     Lab Results   Component Value Date    TSH 0.444 05/12/2023    TSH 0.970 01/06/2023    TSH 0.292 07/07/2022       Lab Results   Component Value Date    FREET4 0.91 (L) 05/12/2023    FREET4 1.18 07/07/2022    FREET4 0.88 (L) 01/04/2022     LT4 at 25 , increased to 50 -75 - increase to 88 mcgs daily -112    Increase to 137 mcgs daily - increase to 150 -- 175  "decrease to 6.5 tabs per week -   Change to 200       TSH was never high before tx       ----    2018     Nl IGF1 and 2021  Nl prolactin    Nl  cosyntropin stim test     2018 - normal pituitary MRI    --    Hypogonadism central  Last test 300   Start clomid   25 mg every other day     -----    Anemia    Do workup     Repeat nl with nl iron , b12, folic acid      Lab Results   Component Value Date    WBC 6.28 05/12/2023    HGB 13.9 05/12/2023    HCT 41.9 05/12/2023    MCV 92.7 05/12/2023     05/12/2023     Lab Results   Component Value Date    IRON 85 12/05/2018    TIBC 293 12/05/2018           ----  Metabolic sx , type 2 diabetes with obesity       Lab Results   Component Value Date    HGBA1C 5.60 05/12/2023    HGBA1C 5.30 01/06/2023    HGBA1C 5.50 07/07/2022     Lab Results   Component Value Date    MICROALBUR 1.2 05/12/2023    CREATININE 1.20 05/12/2023     Lab Results   Component Value Date    GLUCOSE 120 (H) 05/12/2023    CALCIUM 9.2 05/12/2023     05/12/2023    K 3.9 05/12/2023    CO2 28.0 05/12/2023     05/12/2023    BUN 13 05/12/2023    CREATININE 1.20 05/12/2023    EGFRIFNONA 83 01/04/2022    BCR 10.8 05/12/2023    ANIONGAP 8.0 05/12/2023       Intolerant to metformin     Can't tolerate rybelsus     We will try mounjaro     --  /90   Pulse 65   Ht 180.3 cm (71\")   Wt 136 kg (299 lb)   SpO2 99%   BMI 41.70 kg/m²     Using clonidine and losartan    Clonidine only at night , helps with profuse sweating    --  Obesity with prediabetes, hypertension, sleep apnea     No contrave due to depression   He will do lifestyle changes , lost 30 lbs - gained weight   Uses cpap     --    Elevated LFT - most likely fatty liver - resolved     --  Vit D , on replacement at goal   No orders of the defined types were placed in this encounter.        A copy of my note was sent to Judson Gonzalez MD    Please see my above opinion and suggestions.       MDM     I spent 15 minutes reviewing patient " electronic chart , reviewing medications , past history , active problems.   I provided advice regarding management of medical conditions, refilled prescriptions , ordered labs and arranged for future appointment.   Patient was advised to contact us if there were any unanswered questions or ongoing concerns.

## 2023-05-17 ENCOUNTER — DOCUMENTATION (OUTPATIENT)
Dept: ENDOCRINOLOGY | Facility: CLINIC | Age: 48
End: 2023-05-17
Payer: COMMERCIAL

## 2023-05-20 LAB — TESTOST SERPL-MCNC: 644 NG/DL

## 2023-07-25 ENCOUNTER — TELEPHONE (OUTPATIENT)
Dept: NEUROLOGY | Facility: HOSPITAL | Age: 48
End: 2023-07-25

## 2023-08-21 ENCOUNTER — TELEPHONE (OUTPATIENT)
Dept: NEUROLOGY | Facility: HOSPITAL | Age: 48
End: 2023-08-21

## 2023-09-25 ENCOUNTER — LAB (OUTPATIENT)
Dept: LAB | Facility: HOSPITAL | Age: 48
End: 2023-09-25
Payer: COMMERCIAL

## 2023-09-25 DIAGNOSIS — E11.69 TYPE 2 DIABETES MELLITUS WITH OBESITY: ICD-10-CM

## 2023-09-25 DIAGNOSIS — E03.8 CENTRAL HYPOTHYROIDISM: ICD-10-CM

## 2023-09-25 DIAGNOSIS — E66.9 TYPE 2 DIABETES MELLITUS WITH OBESITY: ICD-10-CM

## 2023-09-25 DIAGNOSIS — E78.5 DYSLIPIDEMIA: ICD-10-CM

## 2023-09-25 DIAGNOSIS — E55.9 VITAMIN D DEFICIENCY: ICD-10-CM

## 2023-09-25 DIAGNOSIS — E23.0 HYPOGONADOTROPIC HYPOGONADISM: ICD-10-CM

## 2023-09-25 LAB
ALBUMIN SERPL-MCNC: 4.2 G/DL (ref 3.5–5.2)
ALBUMIN/GLOB SERPL: 1.4 G/DL
ALP SERPL-CCNC: 94 U/L (ref 39–117)
ALT SERPL W P-5'-P-CCNC: 33 U/L (ref 1–41)
ANION GAP SERPL CALCULATED.3IONS-SCNC: 9 MMOL/L (ref 5–15)
AST SERPL-CCNC: 22 U/L (ref 1–40)
BILIRUB SERPL-MCNC: 0.3 MG/DL (ref 0–1.2)
BUN SERPL-MCNC: 14 MG/DL (ref 6–20)
BUN/CREAT SERPL: 14.6 (ref 7–25)
CALCIUM SPEC-SCNC: 9.4 MG/DL (ref 8.6–10.5)
CHLORIDE SERPL-SCNC: 112 MMOL/L (ref 98–107)
CHOLEST SERPL-MCNC: 115 MG/DL (ref 0–200)
CO2 SERPL-SCNC: 24 MMOL/L (ref 22–29)
CREAT SERPL-MCNC: 0.96 MG/DL (ref 0.76–1.27)
EGFRCR SERPLBLD CKD-EPI 2021: 97.5 ML/MIN/1.73
GLOBULIN UR ELPH-MCNC: 3 GM/DL
GLUCOSE SERPL-MCNC: 133 MG/DL (ref 65–99)
HBA1C MFR BLD: 5.6 % (ref 4.8–5.6)
HCT VFR BLD AUTO: 41.5 % (ref 37.5–51)
HDLC SERPL-MCNC: 31 MG/DL (ref 40–60)
LDLC SERPL CALC-MCNC: 53 MG/DL (ref 0–100)
LDLC/HDLC SERPL: 1.5 {RATIO}
POTASSIUM SERPL-SCNC: 4 MMOL/L (ref 3.5–5.2)
PROT SERPL-MCNC: 7.2 G/DL (ref 6–8.5)
SODIUM SERPL-SCNC: 145 MMOL/L (ref 136–145)
T3FREE SERPL-MCNC: 2.72 PG/ML (ref 2–4.4)
T4 FREE SERPL-MCNC: 0.87 NG/DL (ref 0.93–1.7)
TESTOST SERPL-MCNC: 296 NG/DL (ref 249–836)
TRIGL SERPL-MCNC: 188 MG/DL (ref 0–150)
TSH SERPL DL<=0.05 MIU/L-ACNC: 0.22 UIU/ML (ref 0.27–4.2)
VIT B12 BLD-MCNC: 428 PG/ML (ref 211–946)
VLDLC SERPL-MCNC: 31 MG/DL (ref 5–40)

## 2023-09-25 PROCEDURE — 84153 ASSAY OF PSA TOTAL: CPT

## 2023-09-25 PROCEDURE — 84443 ASSAY THYROID STIM HORMONE: CPT

## 2023-09-25 PROCEDURE — 85014 HEMATOCRIT: CPT

## 2023-09-25 PROCEDURE — 80061 LIPID PANEL: CPT

## 2023-09-25 PROCEDURE — 84439 ASSAY OF FREE THYROXINE: CPT

## 2023-09-25 PROCEDURE — 36415 COLL VENOUS BLD VENIPUNCTURE: CPT

## 2023-09-25 PROCEDURE — 83036 HEMOGLOBIN GLYCOSYLATED A1C: CPT

## 2023-09-25 PROCEDURE — 82607 VITAMIN B-12: CPT

## 2023-09-25 PROCEDURE — 84481 FREE ASSAY (FT-3): CPT

## 2023-09-25 PROCEDURE — 84403 ASSAY OF TOTAL TESTOSTERONE: CPT

## 2023-09-25 PROCEDURE — 80053 COMPREHEN METABOLIC PANEL: CPT

## 2023-09-26 ENCOUNTER — LAB (OUTPATIENT)
Dept: LAB | Facility: HOSPITAL | Age: 48
End: 2023-09-26
Payer: COMMERCIAL

## 2023-09-26 ENCOUNTER — OFFICE VISIT (OUTPATIENT)
Dept: ENDOCRINOLOGY | Facility: CLINIC | Age: 48
End: 2023-09-26
Payer: COMMERCIAL

## 2023-09-26 ENCOUNTER — SPECIALTY PHARMACY (OUTPATIENT)
Dept: ENDOCRINOLOGY | Facility: CLINIC | Age: 48
End: 2023-09-26
Payer: COMMERCIAL

## 2023-09-26 VITALS
HEIGHT: 71 IN | WEIGHT: 282 LBS | HEART RATE: 71 BPM | SYSTOLIC BLOOD PRESSURE: 120 MMHG | DIASTOLIC BLOOD PRESSURE: 80 MMHG | BODY MASS INDEX: 39.48 KG/M2 | OXYGEN SATURATION: 98 %

## 2023-09-26 DIAGNOSIS — E11.69 MIXED DIABETIC HYPERLIPIDEMIA ASSOCIATED WITH TYPE 2 DIABETES MELLITUS: ICD-10-CM

## 2023-09-26 DIAGNOSIS — E03.8 CENTRAL HYPOTHYROIDISM: Primary | ICD-10-CM

## 2023-09-26 DIAGNOSIS — E55.9 VITAMIN D DEFICIENCY: ICD-10-CM

## 2023-09-26 DIAGNOSIS — E78.2 MIXED DIABETIC HYPERLIPIDEMIA ASSOCIATED WITH TYPE 2 DIABETES MELLITUS: ICD-10-CM

## 2023-09-26 DIAGNOSIS — I10 PRIMARY HYPERTENSION: ICD-10-CM

## 2023-09-26 DIAGNOSIS — E23.0 HYPOGONADOTROPIC HYPOGONADISM: ICD-10-CM

## 2023-09-26 DIAGNOSIS — E11.69 TYPE 2 DIABETES MELLITUS WITH OBESITY: ICD-10-CM

## 2023-09-26 DIAGNOSIS — E66.9 TYPE 2 DIABETES MELLITUS WITH OBESITY: ICD-10-CM

## 2023-09-26 LAB
GLUCOSE P FAST SERPL-MCNC: 104 MG/DL (ref 74–106)
HOLD SPECIMEN: NORMAL
PSA SERPL-MCNC: 0.6 NG/ML (ref 0–4)
REFLEX: NORMAL
T4 FREE SERPL-MCNC: 1.16 NG/DL (ref 0.93–1.7)
TSH SERPL DL<=0.05 MIU/L-ACNC: 0.15 UIU/ML (ref 0.27–4.2)

## 2023-09-26 PROCEDURE — 99214 OFFICE O/P EST MOD 30 MIN: CPT | Performed by: INTERNAL MEDICINE

## 2023-09-26 PROCEDURE — 84410 TESTOSTERONE BIOAVAILABLE: CPT | Performed by: INTERNAL MEDICINE

## 2023-09-26 PROCEDURE — 84443 ASSAY THYROID STIM HORMONE: CPT | Performed by: INTERNAL MEDICINE

## 2023-09-26 PROCEDURE — 84439 ASSAY OF FREE THYROXINE: CPT | Performed by: INTERNAL MEDICINE

## 2023-09-26 PROCEDURE — 82947 ASSAY GLUCOSE BLOOD QUANT: CPT | Performed by: INTERNAL MEDICINE

## 2023-09-26 PROCEDURE — 36415 COLL VENOUS BLD VENIPUNCTURE: CPT | Performed by: INTERNAL MEDICINE

## 2023-09-26 RX ORDER — TESTOSTERONE CYPIONATE 200 MG/ML
INJECTION, SOLUTION INTRAMUSCULAR
Qty: 4 ML | Refills: 5 | Status: SHIPPED | OUTPATIENT
Start: 2023-09-26

## 2023-09-26 RX ORDER — SYRINGE W-NEEDLE,DISPOSAB,3 ML 25GX5/8"
SYRINGE, EMPTY DISPOSABLE MISCELLANEOUS
Qty: 4 EACH | Refills: 11 | Status: SHIPPED | OUTPATIENT
Start: 2023-09-26

## 2023-09-26 NOTE — PROGRESS NOTES
Carlos Mena is a 48 y.o. male who presents for  evaluation of   Hypothyroidism        Referring provider   Primary Care Provider    Judson Gonzalez MD    48 y.o. 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    I reevaluated testosterone off Clomid and total testosterone was low but bioavailable testosterone was normal   He is interested in replacement since last level was only 300        In our workup we the proved low free T4 confirmed by dialysis and he has been on levothyroxine.    Additional workup for pituitary axis included a cosyntropin stimulation test, measurement of IGF-I, prolactin and all other pituitary hormones are normal.    Additional testing revealed impaired fasting glucose- diabetes     Has gained some weight since last appt  Son dx w lymphoma but now in remission            Objective:     Physical Exam   Constitutional: He appears well-developed and well-nourished.   HENT:   Head: Normocephalic.   Pulmonary/Chest: Effort normal.   Musculoskeletal:         General: No deformity or edema.     Lab Review      Assessment & Plan       ICD-10-CM ICD-9-CM   1. Central hypothyroidism  E03.8 244.9   2. Hypogonadotropic hypogonadism  E23.0 253.4   3. Type 2 diabetes mellitus with obesity  E11.69 250.00    E66.9 278.00   4. Vitamin D deficiency  E55.9 268.9          ===================    Central Hypothyroidism confirmed by Free T4 by dialysis with neg MRI     Lab Results   Component Value Date    TSH 0.223 (L) 09/25/2023    TSH 0.444 05/12/2023    TSH 0.970 01/06/2023       Lab Results   Component Value Date    FREET4 0.87 (L) 09/25/2023    FREET4 0.91 (L) 05/12/2023    FREET4 1.18 07/07/2022     LT4 at 25 , increased to 50 -75 - increase to 88 mcgs daily -112    Increase to 137 mcgs daily - increase to 150 -- 175 decrease to 6.5 tabs per  "week -   Change to 200     Keep this dose     Reevaluate w Free T4 by dialysis       TSH was never high before tx       ----    2018     Nl IGF1 and 2021  Nl prolactin    Nl  cosyntropin stim test     2018 - normal pituitary MRI    --    Hypogonadism central  Last test 300   Start clomid   25 mg every other day   Not covered    Start IM testosterone 100 mg weekly     -----    Anemia    Do workup     Repeat nl with nl iron , b12, folic acid      Lab Results   Component Value Date    WBC 6.28 05/12/2023    HGB 13.9 05/12/2023    HCT 41.5 09/25/2023    MCV 92.7 05/12/2023     05/12/2023     Lab Results   Component Value Date    IRON 85 12/05/2018    TIBC 293 12/05/2018           ----  Metabolic sx , type 2 diabetes with obesity       Lab Results   Component Value Date    HGBA1C 5.60 09/25/2023    HGBA1C 5.60 05/12/2023    HGBA1C 5.30 01/06/2023     Lab Results   Component Value Date    MICROALBUR 1.2 05/12/2023    CREATININE 0.96 09/25/2023     Lab Results   Component Value Date    GLUCOSE 133 (H) 09/25/2023    CALCIUM 9.4 09/25/2023     09/25/2023    K 4.0 09/25/2023    CO2 24.0 09/25/2023     (H) 09/25/2023    BUN 14 09/25/2023    CREATININE 0.96 09/25/2023    EGFRIFNONA 83 01/04/2022    BCR 14.6 09/25/2023    ANIONGAP 9.0 09/25/2023       Intolerant to metformin     Can't tolerate rybelsus     Approve for ozempic     --  /80   Pulse 71   Ht 180.3 cm (71\")   Wt 128 kg (282 lb)   SpO2 98%   BMI 39.33 kg/m²     Using clonidine and losartan    Clonidine only at night , helps with profuse sweating    --  Obesity with diabetes, hypertension, sleep apnea     No contrave due to depression   He will do lifestyle changes , lost 30 lbs - gained weight   Uses cpap     --    Elevated LFT - most likely fatty liver - resolved     --  Vit D , on replacement at goal   No orders of the defined types were placed in this encounter.        A copy of my note was sent to Judson Gonzalez MD    Please see my " above opinion and suggestions.       MDM       I spent 15 minutes reviewing patient electronic chart , reviewing medications , past history , active problems.   I provided advice regarding management of medical conditions, refilled prescriptions , ordered labs and arranged for future appointment.   Patient was advised to contact us if there were any unanswered questions or ongoing concerns.

## 2023-09-27 ENCOUNTER — DOCUMENTATION (OUTPATIENT)
Dept: ENDOCRINOLOGY | Facility: CLINIC | Age: 48
End: 2023-09-27
Payer: COMMERCIAL

## 2023-09-27 RX ORDER — SEMAGLUTIDE 0.68 MG/ML
0.5 INJECTION, SOLUTION SUBCUTANEOUS WEEKLY
Qty: 3 ML | Refills: 11 | Status: SHIPPED | OUTPATIENT
Start: 2023-09-27

## 2023-09-27 NOTE — PROGRESS NOTES
PA FOR TESTOSTERONE CYPIONATE SUBMITTED VIA COVER MY MEDS.    TESTOSTERONE CYPIONATE APPROVED FROM 08/28/2023 through 09/26/2024

## 2023-09-27 NOTE — PROGRESS NOTES
Specialty Pharmacy Patient Management Program  Endocrinology Initial Assessment     Carlos Mena is a male seen by an Endocrinology provider for Type 2 Diabetes and enrolled in the Endocrinology Patient Management program offered by [facility].  An initial outreach was conducted, including assessment of therapy appropriateness and specialty medication education for Ozempic. The patient was introduced to services offered by [facility], including: regular assessments, refill coordination, curbside pick-up or mail order delivery options, prior authorization maintenance, and financial assistance programs as applicable. The patient was also provided with contact information for the pharmacy team.     Insurance Coverage & Financial Support  commercial     Relevant Past Medical History and Comorbidities  Relevant medical history and concomitant health conditions were discussed with the patient. The patient's chart has been reviewed for relevant past medical history and comorbid conditions and updated as necessary.  Past Medical History:   Diagnosis Date    Arthritis     Asthma     Essential hypertension 8/3/2018    Fatty liver     GERD (gastroesophageal reflux disease)     History of adenomatous polyp of colon     Hypercholesterolemia 8/3/2018    Hypogonadism in male 8/3/2018    Peripheral neuropathy     Thyroid disorder      Social History     Socioeconomic History    Marital status:    Tobacco Use    Smoking status: Former     Years: 4.00     Types: Cigarettes     Quit date: 2008     Years since quitting: 15.7    Smokeless tobacco: Never    Tobacco comments:     socially, 1 pack per 1.5 weeks   Substance and Sexual Activity    Alcohol use: Yes     Comment: social    Drug use: No    Sexual activity: Defer            Allergies  Known allergies and reactions were discussed with the patient. The patient's chart has been reviewed for  allergy information and updated as  necessary.   No Known Allergies         Relevant Laboratory Values  A1C Last 3 Results          1/6/2023    07:43 5/12/2023    06:34 9/25/2023    06:57   HGBA1C Last 3 Results   Hemoglobin A1C 5.30  5.60  5.60      Lab Results   Component Value Date    HGBA1C 5.60 09/25/2023     Lab Results   Component Value Date    GLUCOSE 133 (H) 09/25/2023    CALCIUM 9.4 09/25/2023     09/25/2023    K 4.0 09/25/2023    CO2 24.0 09/25/2023     (H) 09/25/2023    BUN 14 09/25/2023    CREATININE 0.96 09/25/2023    EGFRIFNONA 83 01/04/2022    BCR 14.6 09/25/2023    ANIONGAP 9.0 09/25/2023     Lab Results   Component Value Date    CHOL 115 09/25/2023    CHLPL 197 10/19/2015    TRIG 188 (H) 09/25/2023    HDL 31 (L) 09/25/2023    LDL 53 09/25/2023     Microalbumin          5/12/2023    06:34   Microalbumin   Microalbumin, Urine 1.2        Current Medication List  This medication list has been reviewed with the patient and evaluated for any interactions or necessary modifications/recommendations, and updated to include all prescription medications, OTC medications, and supplements the patient is currently taking.  This list reflects what is contained in the patient's profile, which has also been marked as reviewed to communicate to other providers it is the most up to date version of the patient's current medication therapy.     Current Outpatient Medications:     atorvastatin (LIPITOR) 20 MG tablet, Take 1 tablet by mouth Daily., Disp: , Rfl:     Blood Glucose Monitoring Suppl w/Device kit, USE AS INDICATED, ANY MONITOR , ICD10 code is E11.9, Disp: 1 each, Rfl: 1    clomiPHENE (Clomid) 50 MG tablet, Half a tab every other day (Patient not taking: Reported on 9/26/2023), Disp: 24 tablet, Rfl: 11    cloNIDine (CATAPRES) 0.3 MG tablet, Take 1 tablet by mouth 2 (Two) Times a Day. (Patient taking differently: Take 1 tablet by mouth Daily.), Disp: 60 tablet, Rfl: 0    Cyanocobalamin (VITAMIN B 12 PO), Take  by mouth Daily. (Patient  "not taking: Reported on 9/26/2023), Disp: , Rfl:     diclofenac (VOLTAREN) 75 MG EC tablet, , Disp: , Rfl:     esomeprazole (nexIUM) 40 MG capsule, Take 1 capsule by mouth Every Morning Before Breakfast., Disp: , Rfl:     FLUoxetine (PROzac) 20 MG capsule, Take 2 capsules by mouth Daily., Disp: , Rfl:     levothyroxine (Synthroid) 200 MCG tablet, Take 1 tablet by mouth Daily., Disp: 30 tablet, Rfl: 11    levothyroxine (SYNTHROID, LEVOTHROID) 175 MCG tablet, Take 1 tablet by mouth Daily., Disp: 90 tablet, Rfl: 3    losartan (COZAAR) 100 MG tablet, Take 1 tablet by mouth Daily., Disp: , Rfl:     montelukast (SINGULAIR) 10 MG tablet, Take 1 tablet by mouth Every Night., Disp: , Rfl:     Multiple Vitamins-Minerals (MULTIVITAMIN ADULT PO), Take 1 tablet by mouth Daily., Disp: , Rfl:     Needle, Disp, 18G X 1\" misc, Use  weekly as indicated, Disp: 4 each, Rfl: 11    Semaglutide,0.25 or 0.5MG/DOS, (Ozempic, 0.25 or 0.5 MG/DOSE,) 2 MG/3ML solution pen-injector, Inject 0.5 mg under the skin into the appropriate area as directed 1 (One) Time Per Week., Disp: 3 mL, Rfl: 11    Syringe 22G X 1\" 3 ML misc, Use weekly as indicated, Disp: 4 each, Rfl: 11    Testosterone Cypionate (DEPOTESTOTERONE CYPIONATE) 200 MG/ML injection, Use a new vial each time, Inject 0.5 ml IM weekly and discard the other half, Disp: 4 mL, Rfl: 5    vitamin D (ERGOCALCIFEROL) 80079 units capsule capsule, Take 1 capsule by mouth 1 (One) Time Per Week., Disp: , Rfl:          Initial Education Provided for Specialty Medication  The patient has been provided with the following education and any applicable administration techniques (i.e. self-injection) have been demonstrated for the therapies indicated. All questions and concerns have been addressed prior to the patient receiving the medication, and the patient has verbalized comprehension of the education and any materials provided. Additional patient education shall be provided and documented upon request " by the patient, provider, or payer.    Ozempic® (semaglutide)  Medication Expectations   Why am I taking this medication? You are taking Ozempic, along with diet and exercise, to lower blood sugar because you have type 2 diabetes. Diabetes is not curable but with proper medication and treatment, we can keep your blood sugar within your personalized target range.    What should I expect while on this medication? You should expect to see your blood sugar, A1c and possibly weight decrease over time.   How does the medication work? Ozempic is a non-insulin injection that works with your body to release insulin in response to your blood sugar rising.  This medication also slows down food from leaving your stomach, making you feel gil for longer.   How long will I be on this medication for? The amount of time you will be on this medication will be determined by your doctor based on blood sugar and A1c control. You will most likely be on this medication or another diabetes medication throughout your lifetime. Do not abruptly stop this medication without talking to your doctor first.    How do I take this medication? Take as directed on your prescription label. Ozempic is injected under the skin (subcutaneously) of your stomach, thigh or upper arm.  Use this medication once weekly, on the same day each week, and it can be given with or without food.    What are some possible side effects? You may notice you don't feel as hungry, especially when you first start using Ozempic.  The most common side effects are nausea, stomach cramping, and constipation. Stop using Ozempic and call your doctor immediately if you have severe pain in your stomach area that will not go away.    What happens if I miss a dose? If you miss a dose, take it as soon as you remember as long as it is within 5 days after your missed dose.  If more than 5 days have passed, skip the missed dose and resume Ozempic on the regularly scheduled day.      Medication Safety   What are things I should warn my doctor immediately about? Do not use Ozempic if you or a family member have ever had medullary thyroid cancer (MTC) or Multiple Endocrine Neoplasia syndrome type 2 (MEN 2).  Tell your doctor if you have or have had problems with your kidneys or pancreas, or if you are pregnant, planning to become pregnant. Stop using Ozempic and get medical help right away if you have severe pain that will not go away, or if you notice any signs/symptoms of an allergic reaction (rash, hives, difficulty breathing, etc.).    What are things that I should be cautious of? Be cautious of any side effects from this medication. Talk to your doctor if any new ones develop or aren't getting better.    What are some medications that can interact with this one? Taking Ozempic with other medications that also lower your blood sugar such as insulin and glipizide/glimepiride/glyburide may increase the risk of low blood sugar. Your doctor may reduce the dose of these medications when you start Ozempic.  Always tell your doctor or pharmacist immediately if you start taking any new medications, including over-the-counter medications, vitamins, and herbal supplements.       Medication Storage/Handling   How should I handle this medication? Keep this medication out of reach of pets/children and keep the pen capped    How does this medication need to be stored? Store in the refrigerator prior to first use, but do not freeze.  After first use, you may continue to store in the refrigerator or at room temperature for 56 days.  Protect from excessive heat and sunlight.   How should I dispose of this medication? Used Ozempic pens should be thrown away after 56 days, even if medication remains. If your doctor decides to stop this medication, take to your local police station for proper disposal. Some pharmacies also have take-back bins for medication drop-off.      Resources/Support   How can I remind  myself to take this medication? You can download reminder apps to help you manage your refills. You may also set an alarm on your phone to remind you.    Is financial support available?  [employer] can provide co-pay cards if you have commercial insurance or patient assistance if you have Medicare or no insurance.    Which vaccines are recommended for me? Talk to your doctor about these vaccines: Flu, Coronavirus (COVID-19), Pneumococcal (pneumonia), Tdap, Hepatitis B, Zoster (shingles)      Adherence and Self-Administration  Adherence related to the patient's specialty therapy was discussed with the patient. The Adherence segment of this outreach has been reviewed and updated.              Methods for Supporting Patient Adherence and/or Self-Administration: none needed        Reassessment Plan & Follow-Up  1. Medication Therapy Changes: start ozempic  2. Related Plans, Therapy Recommendations, or Therapy Problems to Be Addressed: none  3. Pharmacist to perform regular assessments no more than (6) months from the previous assessment.  4. Care Coordinator to set up future refill outreaches, coordinate prescription delivery, and escalate clinical questions to pharmacist.  5. Welcome information and patient satisfaction survey to be sent by specialty pharmacy team with patient's initial fill.    Attestation      I attest the patient was actively involved in and has agreed to the above plan of care. If the prescribed therapy is at any point deemed not appropriate based on the current or future assessments, a consultation will be initiated with the patient's specialty care provider to determine the best course of action. The revised plan of therapy will be documented along with any required assessments and/or additional patient education provided.     Tushar Staton, Phil, MPH, BCPS   Clinical Specialty Pharmacist, Endocrinology  9/27/2023  09:11 CDT

## 2023-10-03 ENCOUNTER — TELEPHONE (OUTPATIENT)
Dept: NEUROLOGY | Facility: HOSPITAL | Age: 48
End: 2023-10-03
Payer: COMMERCIAL

## 2023-10-03 LAB
TESTOST SERPL-MCNC: 325 NG/DL (ref 264–916)
TESTOSTERONE.FREE+WB MFR SERPL: 33.8 % (ref 9–46)
TESTOSTERONE.FREE+WB SERPL-MCNC: 109.9 NG/DL (ref 40–250)

## 2023-10-04 ENCOUNTER — HOSPITAL ENCOUNTER (OUTPATIENT)
Dept: NEUROLOGY | Facility: HOSPITAL | Age: 48
Discharge: HOME | End: 2023-10-04
Payer: COMMERCIAL

## 2023-10-04 DIAGNOSIS — R20.2 PARESTHESIA: ICD-10-CM

## 2023-10-04 PROCEDURE — 95886 MUSC TEST DONE W/N TEST COMP: CPT

## 2023-10-04 PROCEDURE — 95911 NRV CNDJ TEST 9-10 STUDIES: CPT

## 2023-10-09 LAB — T4 FREE SERPL DIALY-MCNC: 1.2 NG/DL

## 2024-02-06 ENCOUNTER — LAB (OUTPATIENT)
Dept: LAB | Facility: HOSPITAL | Age: 49
End: 2024-02-06
Payer: COMMERCIAL

## 2024-02-06 DIAGNOSIS — E11.69 TYPE 2 DIABETES MELLITUS WITH OBESITY: ICD-10-CM

## 2024-02-06 DIAGNOSIS — E03.8 CENTRAL HYPOTHYROIDISM: ICD-10-CM

## 2024-02-06 DIAGNOSIS — E11.69 MIXED DIABETIC HYPERLIPIDEMIA ASSOCIATED WITH TYPE 2 DIABETES MELLITUS: ICD-10-CM

## 2024-02-06 DIAGNOSIS — E66.9 TYPE 2 DIABETES MELLITUS WITH OBESITY: ICD-10-CM

## 2024-02-06 DIAGNOSIS — E78.2 MIXED DIABETIC HYPERLIPIDEMIA ASSOCIATED WITH TYPE 2 DIABETES MELLITUS: ICD-10-CM

## 2024-02-06 DIAGNOSIS — I10 PRIMARY HYPERTENSION: ICD-10-CM

## 2024-02-06 DIAGNOSIS — E23.0 HYPOGONADOTROPIC HYPOGONADISM: ICD-10-CM

## 2024-02-06 DIAGNOSIS — E55.9 VITAMIN D DEFICIENCY: ICD-10-CM

## 2024-02-06 LAB
ALBUMIN SERPL-MCNC: 4.5 G/DL (ref 3.5–5.2)
ALBUMIN UR-MCNC: <1.2 MG/DL
ALBUMIN/GLOB SERPL: 1.5 G/DL
ALP SERPL-CCNC: 92 U/L (ref 39–117)
ALT SERPL W P-5'-P-CCNC: 25 U/L (ref 1–41)
ANION GAP SERPL CALCULATED.3IONS-SCNC: 9 MMOL/L (ref 5–15)
AST SERPL-CCNC: 19 U/L (ref 1–40)
BASOPHILS # BLD AUTO: 0.05 10*3/MM3 (ref 0–0.2)
BASOPHILS NFR BLD AUTO: 0.8 % (ref 0–1.5)
BILIRUB SERPL-MCNC: 0.5 MG/DL (ref 0–1.2)
BUN SERPL-MCNC: 15 MG/DL (ref 6–20)
BUN/CREAT SERPL: 13.6 (ref 7–25)
CALCIUM SPEC-SCNC: 9.8 MG/DL (ref 8.6–10.5)
CHLORIDE SERPL-SCNC: 104 MMOL/L (ref 98–107)
CHOLEST SERPL-MCNC: 120 MG/DL (ref 0–200)
CO2 SERPL-SCNC: 27 MMOL/L (ref 22–29)
CREAT SERPL-MCNC: 1.1 MG/DL (ref 0.76–1.27)
CREAT UR-MCNC: 151.7 MG/DL
DEPRECATED RDW RBC AUTO: 40.1 FL (ref 37–54)
EGFRCR SERPLBLD CKD-EPI 2021: 82.8 ML/MIN/1.73
EOSINOPHIL # BLD AUTO: 0.19 10*3/MM3 (ref 0–0.4)
EOSINOPHIL NFR BLD AUTO: 3.1 % (ref 0.3–6.2)
ERYTHROCYTE [DISTWIDTH] IN BLOOD BY AUTOMATED COUNT: 12.3 % (ref 12.3–15.4)
GLOBULIN UR ELPH-MCNC: 3.1 GM/DL
GLUCOSE SERPL-MCNC: 109 MG/DL (ref 65–99)
HBA1C MFR BLD: 5.6 % (ref 4.8–5.6)
HCT VFR BLD AUTO: 42.6 % (ref 37.5–51)
HDLC SERPL-MCNC: 30 MG/DL (ref 40–60)
HGB BLD-MCNC: 14.6 G/DL (ref 13–17.7)
IMM GRANULOCYTES # BLD AUTO: 0.03 10*3/MM3 (ref 0–0.05)
IMM GRANULOCYTES NFR BLD AUTO: 0.5 % (ref 0–0.5)
LDLC SERPL CALC-MCNC: 68 MG/DL (ref 0–100)
LDLC/HDLC SERPL: 2.19 {RATIO}
LYMPHOCYTES # BLD AUTO: 1.53 10*3/MM3 (ref 0.7–3.1)
LYMPHOCYTES NFR BLD AUTO: 24.8 % (ref 19.6–45.3)
MCH RBC QN AUTO: 31.1 PG (ref 26.6–33)
MCHC RBC AUTO-ENTMCNC: 34.3 G/DL (ref 31.5–35.7)
MCV RBC AUTO: 90.6 FL (ref 79–97)
MICROALBUMIN/CREAT UR: NORMAL MG/G{CREAT}
MONOCYTES # BLD AUTO: 0.58 10*3/MM3 (ref 0.1–0.9)
MONOCYTES NFR BLD AUTO: 9.4 % (ref 5–12)
NEUTROPHILS NFR BLD AUTO: 3.78 10*3/MM3 (ref 1.7–7)
NEUTROPHILS NFR BLD AUTO: 61.4 % (ref 42.7–76)
NRBC BLD AUTO-RTO: 0 /100 WBC (ref 0–0.2)
PLATELET # BLD AUTO: 233 10*3/MM3 (ref 140–450)
PMV BLD AUTO: 9.7 FL (ref 6–12)
POTASSIUM SERPL-SCNC: 4.2 MMOL/L (ref 3.5–5.2)
PROT SERPL-MCNC: 7.6 G/DL (ref 6–8.5)
RBC # BLD AUTO: 4.7 10*6/MM3 (ref 4.14–5.8)
SODIUM SERPL-SCNC: 140 MMOL/L (ref 136–145)
T3FREE SERPL-MCNC: 3.12 PG/ML (ref 2–4.4)
T4 FREE SERPL-MCNC: 0.91 NG/DL (ref 0.93–1.7)
TESTOST SERPL-MCNC: 343 NG/DL (ref 249–836)
TRIGL SERPL-MCNC: 121 MG/DL (ref 0–150)
TSH SERPL DL<=0.05 MIU/L-ACNC: 0.14 UIU/ML (ref 0.27–4.2)
VIT B12 BLD-MCNC: 516 PG/ML (ref 211–946)
VLDLC SERPL-MCNC: 22 MG/DL (ref 5–40)
WBC NRBC COR # BLD AUTO: 6.16 10*3/MM3 (ref 3.4–10.8)

## 2024-02-06 PROCEDURE — 36415 COLL VENOUS BLD VENIPUNCTURE: CPT

## 2024-02-06 PROCEDURE — 83036 HEMOGLOBIN GLYCOSYLATED A1C: CPT

## 2024-02-06 PROCEDURE — 80050 GENERAL HEALTH PANEL: CPT

## 2024-02-06 PROCEDURE — 82607 VITAMIN B-12: CPT

## 2024-02-06 PROCEDURE — 82043 UR ALBUMIN QUANTITATIVE: CPT

## 2024-02-06 PROCEDURE — 84439 ASSAY OF FREE THYROXINE: CPT

## 2024-02-06 PROCEDURE — 82570 ASSAY OF URINE CREATININE: CPT

## 2024-02-06 PROCEDURE — 84481 FREE ASSAY (FT-3): CPT

## 2024-02-06 PROCEDURE — 84153 ASSAY OF PSA TOTAL: CPT

## 2024-02-06 PROCEDURE — 80061 LIPID PANEL: CPT

## 2024-02-06 PROCEDURE — 84403 ASSAY OF TOTAL TESTOSTERONE: CPT

## 2024-02-07 LAB
PSA SERPL-MCNC: 0.7 NG/ML (ref 0–4)
REFLEX: NORMAL

## 2024-03-12 ENCOUNTER — HOSPITAL ENCOUNTER (OUTPATIENT)
Dept: MRI IMAGING | Age: 49
Discharge: HOME | End: 2024-03-12
Payer: COMMERCIAL

## 2024-03-12 DIAGNOSIS — M24.071 LOOSE BODY OF RIGHT ANKLE: ICD-10-CM

## 2024-03-12 DIAGNOSIS — M25.571 RIGHT ANKLE PAIN, UNSPECIFIED CHRONICITY: ICD-10-CM

## 2024-03-12 DIAGNOSIS — M24.9 JOINT DERANGEMENT: ICD-10-CM

## 2024-03-12 PROCEDURE — 73721 MRI JNT OF LWR EXTRE W/O DYE: CPT

## 2024-06-03 ENCOUNTER — LAB (OUTPATIENT)
Dept: LAB | Facility: HOSPITAL | Age: 49
End: 2024-06-03
Payer: COMMERCIAL

## 2024-06-03 ENCOUNTER — TRANSCRIBE ORDERS (OUTPATIENT)
Dept: ADMINISTRATIVE | Facility: HOSPITAL | Age: 49
End: 2024-06-03
Payer: COMMERCIAL

## 2024-06-03 DIAGNOSIS — Z00.00 ENCOUNTER FOR GENERAL ADULT MEDICAL EXAMINATION WITHOUT ABNORMAL FINDINGS: Primary | ICD-10-CM

## 2024-06-03 DIAGNOSIS — Z00.00 ENCOUNTER FOR GENERAL ADULT MEDICAL EXAMINATION WITHOUT ABNORMAL FINDINGS: ICD-10-CM

## 2024-06-03 LAB
ALBUMIN SERPL-MCNC: 4.5 G/DL (ref 3.5–5.2)
ALBUMIN/GLOB SERPL: 1.3 G/DL
ALP SERPL-CCNC: 84 U/L (ref 39–117)
ALT SERPL W P-5'-P-CCNC: 28 U/L (ref 1–41)
ANION GAP SERPL CALCULATED.3IONS-SCNC: 11 MMOL/L (ref 5–15)
AST SERPL-CCNC: 23 U/L (ref 1–40)
BILIRUB SERPL-MCNC: 0.4 MG/DL (ref 0–1.2)
BUN SERPL-MCNC: 11 MG/DL (ref 6–20)
BUN/CREAT SERPL: 11.1 (ref 7–25)
CALCIUM SPEC-SCNC: 9.3 MG/DL (ref 8.6–10.5)
CHLORIDE SERPL-SCNC: 103 MMOL/L (ref 98–107)
CHOLEST SERPL-MCNC: 137 MG/DL (ref 0–200)
CO2 SERPL-SCNC: 28 MMOL/L (ref 22–29)
CREAT SERPL-MCNC: 0.99 MG/DL (ref 0.76–1.27)
DEPRECATED RDW RBC AUTO: 40.1 FL (ref 37–54)
EGFRCR SERPLBLD CKD-EPI 2021: 94 ML/MIN/1.73
ERYTHROCYTE [DISTWIDTH] IN BLOOD BY AUTOMATED COUNT: 12.2 % (ref 12.3–15.4)
GLOBULIN UR ELPH-MCNC: 3.4 GM/DL
GLUCOSE SERPL-MCNC: 92 MG/DL (ref 65–99)
HBA1C MFR BLD: 5.4 % (ref 4.8–5.6)
HCT VFR BLD AUTO: 42.6 % (ref 37.5–51)
HDLC SERPL-MCNC: 33 MG/DL (ref 40–60)
HGB BLD-MCNC: 15.1 G/DL (ref 13–17.7)
LDLC SERPL CALC-MCNC: 73 MG/DL (ref 0–100)
LDLC/HDLC SERPL: 2.04 {RATIO}
MCH RBC QN AUTO: 32.1 PG (ref 26.6–33)
MCHC RBC AUTO-ENTMCNC: 35.4 G/DL (ref 31.5–35.7)
MCV RBC AUTO: 90.6 FL (ref 79–97)
PLATELET # BLD AUTO: 267 10*3/MM3 (ref 140–450)
PMV BLD AUTO: 9.8 FL (ref 6–12)
POTASSIUM SERPL-SCNC: 4 MMOL/L (ref 3.5–5.2)
PROT SERPL-MCNC: 7.9 G/DL (ref 6–8.5)
PSA SERPL-MCNC: 0.93 NG/ML (ref 0–4)
RBC # BLD AUTO: 4.7 10*6/MM3 (ref 4.14–5.8)
SODIUM SERPL-SCNC: 142 MMOL/L (ref 136–145)
T4 FREE SERPL-MCNC: 1.4 NG/DL (ref 0.93–1.7)
TRIGL SERPL-MCNC: 184 MG/DL (ref 0–150)
TSH SERPL DL<=0.05 MIU/L-ACNC: 0.02 UIU/ML (ref 0.27–4.2)
VLDLC SERPL-MCNC: 31 MG/DL (ref 5–40)
WBC NRBC COR # BLD AUTO: 7.59 10*3/MM3 (ref 3.4–10.8)

## 2024-06-03 PROCEDURE — 84439 ASSAY OF FREE THYROXINE: CPT

## 2024-06-03 PROCEDURE — 80061 LIPID PANEL: CPT

## 2024-06-03 PROCEDURE — 80050 GENERAL HEALTH PANEL: CPT

## 2024-06-03 PROCEDURE — 84153 ASSAY OF PSA TOTAL: CPT

## 2024-06-03 PROCEDURE — 36415 COLL VENOUS BLD VENIPUNCTURE: CPT

## 2024-06-03 PROCEDURE — 83036 HEMOGLOBIN GLYCOSYLATED A1C: CPT

## 2024-08-16 ENCOUNTER — TRANSCRIBE ORDERS (OUTPATIENT)
Dept: ADMINISTRATIVE | Facility: HOSPITAL | Age: 49
End: 2024-08-16
Payer: COMMERCIAL

## 2024-08-16 DIAGNOSIS — I10 ESSENTIAL HYPERTENSION: ICD-10-CM

## 2024-08-16 DIAGNOSIS — E03.8 ACQUIRED CENTRAL HYPOTHYROIDISM: ICD-10-CM

## 2024-08-16 DIAGNOSIS — E29.1 MALE HYPOGONADISM: Primary | ICD-10-CM

## 2024-08-16 DIAGNOSIS — E78.5 DYSLIPIDEMIA: ICD-10-CM

## 2024-08-16 DIAGNOSIS — E11.9 TYPE 2 DIABETES MELLITUS WITH FEATURES OF INSULIN RESISTANCE: ICD-10-CM

## 2024-08-16 DIAGNOSIS — E03.9 HYPOTHYROIDISM, UNSPECIFIED TYPE: ICD-10-CM

## 2024-08-19 ENCOUNTER — LAB (OUTPATIENT)
Dept: LAB | Facility: HOSPITAL | Age: 49
End: 2024-08-19
Payer: COMMERCIAL

## 2024-08-19 DIAGNOSIS — E78.5 DYSLIPIDEMIA: ICD-10-CM

## 2024-08-19 DIAGNOSIS — E11.9 TYPE 2 DIABETES MELLITUS WITH FEATURES OF INSULIN RESISTANCE: ICD-10-CM

## 2024-08-19 DIAGNOSIS — E03.9 HYPOTHYROIDISM, UNSPECIFIED TYPE: ICD-10-CM

## 2024-08-19 DIAGNOSIS — E29.1 MALE HYPOGONADISM: ICD-10-CM

## 2024-08-19 DIAGNOSIS — I10 ESSENTIAL HYPERTENSION: ICD-10-CM

## 2024-08-19 DIAGNOSIS — E03.8 ACQUIRED CENTRAL HYPOTHYROIDISM: ICD-10-CM

## 2024-08-19 LAB
ALBUMIN SERPL-MCNC: 4.4 G/DL (ref 3.5–5.2)
ALBUMIN/GLOB SERPL: 1.4 G/DL
ALP SERPL-CCNC: 88 U/L (ref 39–117)
ALT SERPL W P-5'-P-CCNC: 23 U/L (ref 1–41)
ANION GAP SERPL CALCULATED.3IONS-SCNC: 11 MMOL/L (ref 5–15)
AST SERPL-CCNC: 20 U/L (ref 1–40)
BILIRUB SERPL-MCNC: 0.7 MG/DL (ref 0–1.2)
BUN SERPL-MCNC: 13 MG/DL (ref 6–20)
BUN/CREAT SERPL: 13 (ref 7–25)
CALCIUM SPEC-SCNC: 9.4 MG/DL (ref 8.6–10.5)
CHLORIDE SERPL-SCNC: 102 MMOL/L (ref 98–107)
CO2 SERPL-SCNC: 26 MMOL/L (ref 22–29)
CREAT SERPL-MCNC: 1 MG/DL (ref 0.76–1.27)
EGFRCR SERPLBLD CKD-EPI 2021: 92.3 ML/MIN/1.73
GLOBULIN UR ELPH-MCNC: 3.2 GM/DL
GLUCOSE SERPL-MCNC: 117 MG/DL (ref 65–99)
HCT VFR BLD AUTO: 42.9 % (ref 37.5–51)
POTASSIUM SERPL-SCNC: 4 MMOL/L (ref 3.5–5.2)
PROT SERPL-MCNC: 7.6 G/DL (ref 6–8.5)
SODIUM SERPL-SCNC: 139 MMOL/L (ref 136–145)
T4 FREE SERPL-MCNC: 1.28 NG/DL (ref 0.93–1.7)
TSH SERPL DL<=0.05 MIU/L-ACNC: 0.03 UIU/ML (ref 0.27–4.2)

## 2024-08-19 PROCEDURE — 84443 ASSAY THYROID STIM HORMONE: CPT

## 2024-08-19 PROCEDURE — 84153 ASSAY OF PSA TOTAL: CPT

## 2024-08-19 PROCEDURE — 84403 ASSAY OF TOTAL TESTOSTERONE: CPT

## 2024-08-19 PROCEDURE — 80053 COMPREHEN METABOLIC PANEL: CPT

## 2024-08-19 PROCEDURE — 84439 ASSAY OF FREE THYROXINE: CPT

## 2024-08-19 PROCEDURE — 84154 ASSAY OF PSA FREE: CPT

## 2024-08-19 PROCEDURE — 36415 COLL VENOUS BLD VENIPUNCTURE: CPT

## 2024-08-19 PROCEDURE — 85014 HEMATOCRIT: CPT

## 2024-08-19 PROCEDURE — 84402 ASSAY OF FREE TESTOSTERONE: CPT

## 2024-08-20 LAB
PSA FREE MFR SERPL: 67.5 %
PSA FREE SERPL-MCNC: 0.54 NG/ML
PSA SERPL-MCNC: 0.8 NG/ML (ref 0–4)

## 2024-08-21 LAB
TESTOST FREE SERPL-MCNC: 9.5 PG/ML (ref 6.8–21.5)
TESTOST SERPL-MCNC: 509 NG/DL (ref 264–916)

## 2024-12-13 ENCOUNTER — OFFICE VISIT (OUTPATIENT)
Dept: GASTROENTEROLOGY | Facility: CLINIC | Age: 49
End: 2024-12-13
Payer: COMMERCIAL

## 2024-12-13 VITALS
OXYGEN SATURATION: 98 % | BODY MASS INDEX: 36.96 KG/M2 | DIASTOLIC BLOOD PRESSURE: 82 MMHG | SYSTOLIC BLOOD PRESSURE: 130 MMHG | TEMPERATURE: 97.7 F | HEIGHT: 71 IN | WEIGHT: 264 LBS | HEART RATE: 91 BPM

## 2024-12-13 DIAGNOSIS — R19.7 DIARRHEA, UNSPECIFIED TYPE: ICD-10-CM

## 2024-12-13 DIAGNOSIS — R14.2 BELCHING: ICD-10-CM

## 2024-12-13 DIAGNOSIS — R10.9 ABDOMINAL CRAMPING: ICD-10-CM

## 2024-12-13 DIAGNOSIS — Z80.0 FAMILY HX OF COLON CANCER: ICD-10-CM

## 2024-12-13 DIAGNOSIS — Z86.0101 HISTORY OF ADENOMATOUS POLYP OF COLON: Primary | ICD-10-CM

## 2024-12-13 NOTE — PROGRESS NOTES
[facility]    Primary Physician Judson Gonzalez MD    12/13/2024    Carlos Mena   1975      Chief Complaint   Patient presents with    Colonoscopy   Loose stools  Abdominal cramping.     Subjective     HPI    Carlos Mena is a 49 y.o. male who presents as a referral for preventative maintenance. He has no complaints of nausea or vomiting. No change in bowels. No wt loss. No BRBPR. No melena. No abdominal pain.     GERD   This is under control. He takes nexium daily for years. He has had belching this week. He has had upper abdominal cramping over there last 1 week.       Loose stools  Over the last 1 week. Has had abdominal cramping. He is having 5-6 stools per day. He typically has 1 stool per day.  No new meds. No sick contacts.  No fever. No n/v. No rectal bleeding.       COLONOSCOPY (10/30/2019 12:09) recall 5 years.   2014 adenomatous colon polyp.   Paternal uncle had colon cancer.     ENDOSCOPY, INT (10/30/2014 00:00)         He started ozempic spring 2024. No increase in dose recently.       Past Medical History:   Diagnosis Date    Arthritis     Asthma     Essential hypertension 08/03/2018    Fatty liver     GERD (gastroesophageal reflux disease)     History of adenomatous polyp of colon     Hypercholesterolemia 08/03/2018    Hypogonadism in male 08/03/2018    Peripheral neuropathy     Pre-diabetes     Thyroid disorder        Past Surgical History:   Procedure Laterality Date    APPENDECTOMY      COLONOSCOPY  10/30/2014    adenomatous polyp, diverticulosis    COLONOSCOPY N/A 10/30/2019    Procedure: COLONOSCOPY WITH ANESTHESIA;  Surgeon: Perfecto Peraza MD;  Location: [facility] ENDOSCOPY;  Service: Gastroenterology    ENDOSCOPY  10/30/2014    normal    MOUTH SURGERY      VASECTOMY         Outpatient Medications Marked as Taking for the 12/13/24 encounter (Office Visit) with Hailee Matt APRN   Medication Sig Dispense Refill    atorvastatin (LIPITOR) 20 MG tablet Take 1  "tablet by mouth Daily.      Blood Glucose Monitoring Suppl w/Device kit USE AS INDICATED, ANY MONITOR , ICD10 code is E11.9 1 each 1    cloNIDine (CATAPRES) 0.3 MG tablet Take 1 tablet by mouth 2 (Two) Times a Day. (Patient taking differently: Take 1 tablet by mouth Daily.) 60 tablet 0    diclofenac (VOLTAREN) 75 MG EC tablet As Needed.      esomeprazole (nexIUM) 40 MG capsule Take 1 capsule by mouth Every Morning Before Breakfast.      FLUoxetine (PROzac) 20 MG capsule Take 2 capsules by mouth Daily.      levothyroxine (Synthroid) 200 MCG tablet Take 1 tablet by mouth Daily. 30 tablet 11    losartan (COZAAR) 100 MG tablet Take 1 tablet by mouth Daily.      montelukast (SINGULAIR) 10 MG tablet Take 1 tablet by mouth Every Night.      Multiple Vitamins-Minerals (MULTIVITAMIN ADULT PO) Take 1 tablet by mouth Daily.      Needle, Disp, 18G X 1\" misc Use  weekly as indicated 4 each 11    Semaglutide,0.25 or 0.5MG/DOS, (Ozempic, 0.25 or 0.5 MG/DOSE,) 2 MG/3ML solution pen-injector Inject 0.5 mg under the skin into the appropriate area as directed 1 (One) Time Per Week. 3 mL 11    Syringe 22G X 1\" 3 ML misc Use weekly as indicated 4 each 11    Testosterone Cypionate (DEPOTESTOTERONE CYPIONATE) 200 MG/ML injection Use a new vial each time, Inject 0.5 ml IM weekly and discard the other half 4 mL 5       No Known Allergies    Social History     Socioeconomic History    Marital status:    Tobacco Use    Smoking status: Former     Current packs/day: 0.00     Types: Cigarettes     Start date:      Quit date:      Years since quittin.9    Smokeless tobacco: Never    Tobacco comments:     socially, 1 pack per 1.5 weeks   Substance and Sexual Activity    Alcohol use: Yes     Comment: social    Drug use: No    Sexual activity: Defer       Family History   Problem Relation Age of Onset    Diabetes Mother     Colon cancer Paternal Uncle     No Known Problems Father     No Known Problems Sister     No Known Problems " Brother     No Known Problems Maternal Aunt     No Known Problems Maternal Uncle     No Known Problems Paternal Aunt     No Known Problems Maternal Grandmother     No Known Problems Maternal Grandfather     No Known Problems Paternal Grandmother     No Known Problems Paternal Grandfather     No Known Problems Other     Asthma Neg Hx     Cancer Neg Hx     Emphysema Neg Hx     Heart failure Neg Hx     Hypertension Neg Hx        Review of Systems   Constitutional:  Negative for chills, fever and unexpected weight change.   Respiratory:  Negative for shortness of breath.    Cardiovascular:  Negative for chest pain.   Gastrointestinal:  Negative for abdominal distention, abdominal pain, anal bleeding, blood in stool, constipation, diarrhea, nausea and vomiting.       Objective     Vitals:    12/13/24 0842   BP: 130/82   Pulse: 91   Temp: 97.7 °F (36.5 °C)   SpO2: 98%         12/13/24  0842   Weight: 120 kg (264 lb)     Body mass index is 36.82 kg/m².    Physical Exam  Vitals reviewed.   Constitutional:       General: He is not in acute distress.  Cardiovascular:      Rate and Rhythm: Normal rate and regular rhythm.      Heart sounds: Normal heart sounds.   Pulmonary:      Effort: Pulmonary effort is normal.      Breath sounds: Normal breath sounds.   Abdominal:      General: Bowel sounds are normal. There is no distension.      Palpations: Abdomen is soft.      Tenderness: There is no abdominal tenderness.   Skin:     General: Skin is warm and dry.   Neurological:      Mental Status: He is alert.         Imaging Results (Most Recent)       None            Assessment & Plan     Diagnoses and all orders for this visit:    1. History of adenomatous polyp of colon (Primary)    2. Family hx of colon cancer    3. Diarrhea, unspecified type  -     Clostridioides difficile Toxin - Stool, Per Rectum; Future  -     Gastrointestinal Panel, PCR - Stool, Per Rectum; Future  -     Ova & Parasite Examination - Stool, Per Rectum;  Future    4. Abdominal cramping    5. Belching    He is due for repeat colonoscopy. However he has had issues with diarrhea recently so I will check stool studies prior to scheduling.          In regards to diarrhea, check stool studies.  He had labs this am for his pcp ( I will request those lab results).  I recommend start on a probiotic daily. I will contact him when stool results are available to me.      In regards to the belching, I recommend trial otc anti gas pills. We did discuss egd at same time as colonoscopy when scheduled.         * Surgery not found *  All risks, benefits, alternatives, and indications of colonoscopy procedure have been discussed with the patient. Risks to include perforation of the colon requiring possible surgery or colostomy, risk of bleeding from biopsies or removal of colon tissue, possibility of missing a colon polyp or cancer, or adverse drug reaction.  Benefits to include the diagnosis and management of disease of the colon and rectum. Alternatives to include barium enema, radiographic evaluation, lab testing or no intervention. Pt verbalizes understanding and agrees.     Risk, benefits, and alternatives of endoscopy were explained in full.  They understand that there is a risk of bleeding, perforation, and infection.  The risk of perforation goes up with esophageal dilation.  Other options to evaluate UGI complaints could involve barium swallow or UGI series, but these would be diagnostic tests only.  Patient was given time to ask questions.  I answered them to their satisfaction and they are agreeable to proceeding.     There are no Patient Instructions on file for this visit.    MICHELLE Rosenbaum

## 2024-12-15 ENCOUNTER — LAB REQUISITION (OUTPATIENT)
Dept: LAB | Facility: HOSPITAL | Age: 49
End: 2024-12-15
Payer: COMMERCIAL

## 2024-12-15 DIAGNOSIS — R19.7 DIARRHEA, UNSPECIFIED: ICD-10-CM

## 2024-12-15 LAB
ADV 40+41 DNA STL QL NAA+NON-PROBE: NOT DETECTED
ASTRO TYP 1-8 RNA STL QL NAA+NON-PROBE: NOT DETECTED
C CAYETANENSIS DNA STL QL NAA+NON-PROBE: NOT DETECTED
C COLI+JEJ+UPSA DNA STL QL NAA+NON-PROBE: NOT DETECTED
C DIFF TOX GENS STL QL NAA+PROBE: NEGATIVE
CRYPTOSP DNA STL QL NAA+NON-PROBE: NOT DETECTED
E HISTOLYT DNA STL QL NAA+NON-PROBE: NOT DETECTED
EAEC PAA PLAS AGGR+AATA ST NAA+NON-PRB: NOT DETECTED
EC STX1+STX2 GENES STL QL NAA+NON-PROBE: NOT DETECTED
EPEC EAE GENE STL QL NAA+NON-PROBE: NOT DETECTED
ETEC LTA+ST1A+ST1B TOX ST NAA+NON-PROBE: NOT DETECTED
G LAMBLIA DNA STL QL NAA+NON-PROBE: NOT DETECTED
NOROVIRUS GI+II RNA STL QL NAA+NON-PROBE: NOT DETECTED
P SHIGELLOIDES DNA STL QL NAA+NON-PROBE: NOT DETECTED
RVA RNA STL QL NAA+NON-PROBE: NOT DETECTED
S ENT+BONG DNA STL QL NAA+NON-PROBE: NOT DETECTED
SAPO I+II+IV+V RNA STL QL NAA+NON-PROBE: NOT DETECTED
SHIGELLA SP+EIEC IPAH ST NAA+NON-PROBE: NOT DETECTED
V CHOL+PARA+VUL DNA STL QL NAA+NON-PROBE: NOT DETECTED
V CHOLERAE DNA STL QL NAA+NON-PROBE: NOT DETECTED
Y ENTEROCOL DNA STL QL NAA+NON-PROBE: NOT DETECTED

## 2024-12-15 PROCEDURE — 87507 IADNA-DNA/RNA PROBE TQ 12-25: CPT | Performed by: NURSE PRACTITIONER

## 2024-12-15 PROCEDURE — 87209 SMEAR COMPLEX STAIN: CPT | Performed by: NURSE PRACTITIONER

## 2024-12-15 PROCEDURE — 87177 OVA AND PARASITES SMEARS: CPT | Performed by: NURSE PRACTITIONER

## 2024-12-15 PROCEDURE — 87493 C DIFF AMPLIFIED PROBE: CPT | Performed by: NURSE PRACTITIONER

## 2024-12-17 ENCOUNTER — TELEPHONE (OUTPATIENT)
Dept: GASTROENTEROLOGY | Facility: CLINIC | Age: 49
End: 2024-12-17
Payer: COMMERCIAL

## 2024-12-17 ENCOUNTER — PREP FOR SURGERY (OUTPATIENT)
Dept: OTHER | Facility: HOSPITAL | Age: 49
End: 2024-12-17
Payer: COMMERCIAL

## 2024-12-17 DIAGNOSIS — R10.9 ABDOMINAL CRAMPING: ICD-10-CM

## 2024-12-17 DIAGNOSIS — R19.7 DIARRHEA, UNSPECIFIED: Primary | ICD-10-CM

## 2024-12-17 DIAGNOSIS — R14.2 BELCHING: ICD-10-CM

## 2024-12-17 DIAGNOSIS — Z86.0101 HISTORY OF ADENOMATOUS POLYP OF COLON: ICD-10-CM

## 2024-12-17 DIAGNOSIS — Z80.0 FAMILY HX OF COLON CANCER: ICD-10-CM

## 2024-12-17 LAB
O+P SPEC MICRO: NORMAL
O+P STL CONC: NORMAL

## 2024-12-17 NOTE — TELEPHONE ENCOUNTER
Please let him know stool studies negative. Scheduled colonoscopy and egd. Miralax prep. Thank you

## 2025-02-21 ENCOUNTER — TELEPHONE (OUTPATIENT)
Dept: GASTROENTEROLOGY | Facility: CLINIC | Age: 50
End: 2025-02-21

## 2025-02-21 NOTE — TELEPHONE ENCOUNTER
Caller: Carlos Mena    Relationship to patient: Self    Best call back number: [phone]     Patient is needing: PATIENT HAS A PROCEDURE SCHEDULED FOR NEXT FRIDAY, 2/28/25.  HE STILL NEEDS HIS PREP INSTRUCTIONS AND IS ASKING FOR THEM TO BE EMAILED TO HIM IF POSSIBLE.      EMAIL:  [email or web address]         
[email or web address]

## 2025-02-28 ENCOUNTER — ANESTHESIA EVENT (OUTPATIENT)
Dept: GASTROENTEROLOGY | Facility: HOSPITAL | Age: 50
End: 2025-02-28
Payer: COMMERCIAL

## 2025-02-28 ENCOUNTER — ANESTHESIA (OUTPATIENT)
Dept: GASTROENTEROLOGY | Facility: HOSPITAL | Age: 50
End: 2025-02-28
Payer: COMMERCIAL

## 2025-02-28 ENCOUNTER — TELEPHONE (OUTPATIENT)
Dept: GASTROENTEROLOGY | Facility: CLINIC | Age: 50
End: 2025-02-28
Payer: COMMERCIAL

## 2025-02-28 ENCOUNTER — HOSPITAL ENCOUNTER (OUTPATIENT)
Facility: HOSPITAL | Age: 50
Setting detail: HOSPITAL OUTPATIENT SURGERY
Discharge: HOME | End: 2025-02-28
Attending: INTERNAL MEDICINE | Admitting: INTERNAL MEDICINE
Payer: COMMERCIAL

## 2025-02-28 VITALS
WEIGHT: 261 LBS | HEART RATE: 74 BPM | DIASTOLIC BLOOD PRESSURE: 77 MMHG | BODY MASS INDEX: 36.54 KG/M2 | OXYGEN SATURATION: 99 % | TEMPERATURE: 96.9 F | HEIGHT: 71 IN | RESPIRATION RATE: 20 BRPM | SYSTOLIC BLOOD PRESSURE: 111 MMHG

## 2025-02-28 LAB — GLUCOSE BLDC GLUCOMTR-MCNC: 94 MG/DL (ref 70–130)

## 2025-02-28 PROCEDURE — 25010000002 LIDOCAINE PF 2% 2 % SOLUTION: Performed by: NURSE ANESTHETIST, CERTIFIED REGISTERED

## 2025-02-28 PROCEDURE — 25810000003 SODIUM CHLORIDE 0.9 % SOLUTION: Performed by: NURSE ANESTHETIST, CERTIFIED REGISTERED

## 2025-02-28 PROCEDURE — 45378 DIAGNOSTIC COLONOSCOPY: CPT | Performed by: INTERNAL MEDICINE

## 2025-02-28 PROCEDURE — 25010000002 PROPOFOL 10 MG/ML EMULSION: Performed by: NURSE ANESTHETIST, CERTIFIED REGISTERED

## 2025-02-28 PROCEDURE — 25810000003 SODIUM CHLORIDE 0.9 % SOLUTION: Performed by: ANESTHESIOLOGY

## 2025-02-28 PROCEDURE — 82948 REAGENT STRIP/BLOOD GLUCOSE: CPT

## 2025-02-28 PROCEDURE — 43235 EGD DIAGNOSTIC BRUSH WASH: CPT | Performed by: INTERNAL MEDICINE

## 2025-02-28 RX ORDER — PROPOFOL 10 MG/ML
VIAL (ML) INTRAVENOUS AS NEEDED
Status: DISCONTINUED | OUTPATIENT
Start: 2025-02-28 | End: 2025-02-28 | Stop reason: SURG

## 2025-02-28 RX ORDER — SODIUM CHLORIDE 9 MG/ML
500 INJECTION, SOLUTION INTRAVENOUS ONCE
Status: COMPLETED | OUTPATIENT
Start: 2025-02-28 | End: 2025-02-28

## 2025-02-28 RX ORDER — SODIUM CHLORIDE 0.9 % (FLUSH) 0.9 %
10 SYRINGE (ML) INJECTION AS NEEDED
Status: DISCONTINUED | OUTPATIENT
Start: 2025-02-28 | End: 2025-02-28 | Stop reason: HOSPADM

## 2025-02-28 RX ORDER — SODIUM CHLORIDE 9 MG/ML
INJECTION, SOLUTION INTRAVENOUS CONTINUOUS PRN
Status: DISCONTINUED | OUTPATIENT
Start: 2025-02-28 | End: 2025-02-28 | Stop reason: SURG

## 2025-02-28 RX ORDER — SODIUM CHLORIDE 0.9 % (FLUSH) 0.9 %
10 SYRINGE (ML) INJECTION EVERY 12 HOURS SCHEDULED
Status: CANCELLED | OUTPATIENT
Start: 2025-02-28

## 2025-02-28 RX ORDER — LIDOCAINE HYDROCHLORIDE 10 MG/ML
0.5 INJECTION, SOLUTION EPIDURAL; INFILTRATION; INTRACAUDAL; PERINEURAL ONCE AS NEEDED
Status: CANCELLED | OUTPATIENT
Start: 2025-02-28

## 2025-02-28 RX ORDER — ONDANSETRON 2 MG/ML
4 INJECTION INTRAMUSCULAR; INTRAVENOUS ONCE AS NEEDED
Status: DISCONTINUED | OUTPATIENT
Start: 2025-02-28 | End: 2025-02-28 | Stop reason: HOSPADM

## 2025-02-28 RX ORDER — SODIUM CHLORIDE 0.9 % (FLUSH) 0.9 %
10 SYRINGE (ML) INJECTION AS NEEDED
Status: CANCELLED | OUTPATIENT
Start: 2025-02-28

## 2025-02-28 RX ORDER — SODIUM CHLORIDE 9 MG/ML
40 INJECTION, SOLUTION INTRAVENOUS AS NEEDED
Status: CANCELLED | OUTPATIENT
Start: 2025-02-28

## 2025-02-28 RX ORDER — LIDOCAINE HYDROCHLORIDE 20 MG/ML
INJECTION, SOLUTION EPIDURAL; INFILTRATION; INTRACAUDAL; PERINEURAL AS NEEDED
Status: DISCONTINUED | OUTPATIENT
Start: 2025-02-28 | End: 2025-02-28 | Stop reason: SURG

## 2025-02-28 RX ORDER — SODIUM CHLORIDE 9 MG/ML
100 INJECTION, SOLUTION INTRAVENOUS ONCE
Status: CANCELLED | OUTPATIENT
Start: 2025-02-28

## 2025-02-28 RX ADMIN — SODIUM CHLORIDE: 9 INJECTION, SOLUTION INTRAVENOUS at 08:06

## 2025-02-28 RX ADMIN — LIDOCAINE HYDROCHLORIDE 40 MG: 20 INJECTION, SOLUTION EPIDURAL; INFILTRATION; INTRACAUDAL; PERINEURAL at 08:20

## 2025-02-28 RX ADMIN — SODIUM CHLORIDE 500 ML: 9 INJECTION, SOLUTION INTRAVENOUS at 07:48

## 2025-02-28 RX ADMIN — PROPOFOL 450 MG: 10 INJECTION, EMULSION INTRAVENOUS at 08:20

## 2025-02-28 NOTE — ANESTHESIA POSTPROCEDURE EVALUATION
Patient: Carlos Mena    Procedure Summary       Date: 02/28/25 Room / Location: [facility] ENDOSCOPY 2 / [facility]    Anesthesia Start: 0820 Anesthesia Stop: 0848    Procedures:       ESOPHAGOGASTRODUODENOSCOPY WITH ANESTHESIA      COLONOSCOPY WITH ANESTHESIA Diagnosis:       Diarrhea, unspecified      History of adenomatous polyp of colon      Family hx of colon cancer      Abdominal cramping      Belching      (Diarrhea, unspecified [R19.7])      (History of adenomatous polyp of colon [Z86.0101])      (Family hx of colon cancer [Z80.0])      (Abdominal cramping [R10.9])      (Belching [R14.2])    Surgeons: Perfecto Peraza MD Provider: Bernabe Barahona CRNA    Anesthesia Type: MAC ASA Status: 3            Anesthesia Type: MAC    Vitals  Vitals Value Taken Time   /77 02/28/25 0926   Temp     Pulse 74 02/28/25 0926   Resp 20 02/28/25 0925   SpO2 99 % 02/28/25 0926   Vitals shown include unfiled device data.        Post Anesthesia Care and Evaluation    Patient location during evaluation: PHASE II  Level of consciousness: awake  Pain management: adequate    Airway patency: patent  Anesthetic complications: No anesthetic complications  PONV Status: none  Cardiovascular status: acceptable  Respiratory status: acceptable  Hydration status: acceptable

## 2025-02-28 NOTE — ANESTHESIA PREPROCEDURE EVALUATION
Anesthesia Evaluation     NPO Solid Status: > 8 hours  NPO Liquid Status: > 2 hours           Airway   Mallampati: II  TM distance: >3 FB  No difficulty expected  Dental - normal exam     Pulmonary    (+) asthma,  Cardiovascular   Exercise tolerance: good (4-7 METS)    (+) hypertension, hyperlipidemia      Neuro/Psych  GI/Hepatic/Renal/Endo    (+) obesity, morbid obesity, GERD, liver disease, thyroid problem   (-) diabetes    Musculoskeletal     Abdominal    Substance History      OB/GYN          Other   arthritis,                 Anesthesia Plan    ASA 3     MAC     intravenous induction     Anesthetic plan, risks, benefits, and alternatives have been provided, discussed and informed consent has been obtained with: patient.    CODE STATUS:

## 2025-02-28 NOTE — H&P
[facility] Gastroenterology  Pre Procedure History & Physical    Chief Complaint:   Colon polyps    Subjective     HPI:   The patient has a history of colon polyps who presents for exam.  He also presents for endoscopy evaluation of longstanding reflux.  He has occasional bouts.  He did have diarrhea illness couple months ago but that is resolved he states.    Past Medical History:   Past Medical History:   Diagnosis Date    Arthritis     Asthma     Essential hypertension 08/03/2018    Fatty liver     GERD (gastroesophageal reflux disease)     History of adenomatous polyp of colon     Hypercholesterolemia 08/03/2018    Hypogonadism in male 08/03/2018    Peripheral neuropathy     Pre-diabetes     Sleep apnea     cpap    Thyroid disorder        Past Surgical History:  Past Surgical History:   Procedure Laterality Date    APPENDECTOMY      COLONOSCOPY  10/30/2014    adenomatous polyp, diverticulosis    COLONOSCOPY N/A 10/30/2019    Procedure: COLONOSCOPY WITH ANESTHESIA;  Surgeon: Perfecto Peraza MD;  Location: [facility] ENDOSCOPY;  Service: Gastroenterology    ENDOSCOPY  10/30/2014    normal    MOUTH SURGERY      VASECTOMY         Family History:  Family History   Problem Relation Age of Onset    Diabetes Mother     Heart disease Father     Prostate cancer Father     No Known Problems Sister     No Known Problems Brother     No Known Problems Maternal Aunt     No Known Problems Maternal Uncle     No Known Problems Paternal Aunt     Colon cancer Paternal Uncle     No Known Problems Maternal Grandmother     No Known Problems Maternal Grandfather     No Known Problems Paternal Grandmother     No Known Problems Paternal Grandfather     No Known Problems Other     Asthma Neg Hx     Cancer Neg Hx     Emphysema Neg Hx     Heart failure Neg Hx     Hypertension Neg Hx        Social History:   reports that he quit smoking about 17 years ago. His smoking use included cigarettes. He started smoking about 21 years ago. He has  "never used smokeless tobacco. He reports current alcohol use. He reports that he does not use drugs.    Medications:   Prior to Admission medications    Medication Sig Start Date End Date Taking? Authorizing Provider   atorvastatin (LIPITOR) 20 MG tablet Take 1 tablet by mouth Daily.   Yes Pennie Sawyer MD   cloNIDine (CATAPRES) 0.3 MG tablet Take 1 tablet by mouth 2 (Two) Times a Day.  Patient taking differently: Take 1 tablet by mouth Daily. 7/28/20  Yes Maxim Ceron MD   esomeprazole (nexIUM) 40 MG capsule Take 1 capsule by mouth Every Morning Before Breakfast.   Yes Pennie Sawyer MD   FLUoxetine (PROzac) 20 MG capsule Take 2 capsules by mouth Daily.   Yes Pennie Sawyer MD   levothyroxine (Synthroid) 200 MCG tablet Take 1 tablet by mouth Daily. 5/15/23 2/28/25 Yes Maxim Ceron MD   losartan (COZAAR) 100 MG tablet Take 1 tablet by mouth Daily.   Yes Pennie Sawyer MD   montelukast (SINGULAIR) 10 MG tablet Take 1 tablet by mouth Every Night.   Yes Pennie Sawyer MD   Multiple Vitamins-Minerals (MULTIVITAMIN ADULT PO) Take 1 tablet by mouth Daily.   Yes Pennie Sawyer MD   Needle, Disp, 18G X 1\" misc Use  weekly as indicated 9/26/23  Yes Maxim Ceron MD   Syringe 22G X 1\" 3 ML misc Use weekly as indicated 9/26/23  Yes Maxim Ceron MD   Testosterone Cypionate (DEPOTESTOTERONE CYPIONATE) 200 MG/ML injection Use a new vial each time, Inject 0.5 ml IM weekly and discard the other half 9/26/23  Yes Maxim Ceron MD   Blood Glucose Monitoring Suppl w/Device kit USE AS INDICATED, ANY MONITOR , ICD10 code is E11.9 12/6/18   Maxim Ceron MD   diclofenac (VOLTAREN) 75 MG EC tablet As Needed. 2/7/20   Pennie Sawyer MD   Semaglutide,0.25 or 0.5MG/DOS, (Ozempic, 0.25 or 0.5 MG/DOSE,) 2 MG/3ML solution pen-injector Inject 0.5 mg under the skin into the appropriate area as directed 1 (One) Time Per " "Week. 9/27/23   Maxim Ceron MD   clomiPHENE (Clomid) 50 MG tablet Half a tab every other day  Patient not taking: Reported on 12/13/2024 1/9/23 2/28/25  Maxim Ceron MD   Cyanocobalamin (VITAMIN B 12 PO) Take  by mouth Daily.  Patient not taking: Reported on 12/13/2024 2/28/25  ProviderPennie MD   levothyroxine (SYNTHROID, LEVOTHROID) 175 MCG tablet Take 1 tablet by mouth Daily. 1/15/23 2/28/25  Maxim Ceron MD   vitamin D (ERGOCALCIFEROL) 94048 units capsule capsule Take 1 capsule by mouth 1 (One) Time Per Week.  Patient not taking: Reported on 12/13/2024 2/28/25  Pennie Sawyer MD       Allergies:  Patient has no known allergies.    ROS:    General: Weight stable  Resp: No SOA  Cardiovascular: No CP    Objective     Blood pressure 117/74, pulse 72, temperature 96.9 °F (36.1 °C), temperature source Temporal, resp. rate 18, height 180.3 cm (71\"), weight 118 kg (261 lb), SpO2 96%.    Physical Exam   Constitutional: Pt is oriented to person, place, and in no distress.   Cardiovascular: Normal rate, regular rhythm.    Pulmonary/Chest: Effort normal. No respiratory distress.   Abdominal:  Non-distended.  Psychiatric: Mood, memory, affect and judgment appear normal.     Assessment & Plan     Diagnosis:  Colon polyps  GERD  Anticipated Surgical Procedure:  Colonoscopy, endoscopy    The risks, benefits, and alternatives of this procedure have been discussed with the patient or the responsible party- the patient understands and agrees to proceed.      EMR Dragon/transcription disclaimer:  Much of this encounter note is electronic transcription/translation of spoken language to printed text.  The electronic translation of spoken language may be erroneous, or at times, nonsensical words or phrases may be inadvertently transcribed.  Although I have reviewed the note for such errors, some may still exist.  "

## 2025-03-25 ENCOUNTER — TRANSCRIBE ORDERS (OUTPATIENT)
Dept: ADMINISTRATIVE | Facility: HOSPITAL | Age: 50
End: 2025-03-25
Payer: COMMERCIAL

## 2025-03-25 ENCOUNTER — LAB (OUTPATIENT)
Dept: LAB | Facility: HOSPITAL | Age: 50
End: 2025-03-25
Payer: COMMERCIAL

## 2025-03-25 DIAGNOSIS — E11.9 TYPE 2 DIABETES MELLITUS WITH FEATURES OF INSULIN RESISTANCE: ICD-10-CM

## 2025-03-25 DIAGNOSIS — I10 ESSENTIAL HYPERTENSION: ICD-10-CM

## 2025-03-25 DIAGNOSIS — E29.1 MALE HYPOGONADISM: ICD-10-CM

## 2025-03-25 DIAGNOSIS — E61.1 IRON DEFICIENCY: ICD-10-CM

## 2025-03-25 DIAGNOSIS — E29.1 MALE HYPOGONADISM: Primary | ICD-10-CM

## 2025-03-25 DIAGNOSIS — E78.5 DYSLIPIDEMIA: ICD-10-CM

## 2025-03-25 DIAGNOSIS — E03.8 ACQUIRED CENTRAL HYPOTHYROIDISM: ICD-10-CM

## 2025-03-25 LAB
25(OH)D3 SERPL-MCNC: 36.9 NG/ML (ref 30–100)
ALBUMIN SERPL-MCNC: 4.4 G/DL (ref 3.5–5.2)
ALBUMIN UR-MCNC: 1.3 MG/DL
ALBUMIN/GLOB SERPL: 1.3 G/DL
ALP SERPL-CCNC: 98 U/L (ref 39–117)
ALT SERPL W P-5'-P-CCNC: 18 U/L (ref 1–41)
ANION GAP SERPL CALCULATED.3IONS-SCNC: 10 MMOL/L (ref 5–15)
AST SERPL-CCNC: 18 U/L (ref 1–40)
BASOPHILS # BLD AUTO: 0.06 10*3/MM3 (ref 0–0.2)
BASOPHILS NFR BLD AUTO: 1 % (ref 0–1.5)
BILIRUB SERPL-MCNC: 0.6 MG/DL (ref 0–1.2)
BUN SERPL-MCNC: 20 MG/DL (ref 6–20)
BUN/CREAT SERPL: 18.2 (ref 7–25)
CALCIUM SPEC-SCNC: 9.9 MG/DL (ref 8.6–10.5)
CHLORIDE SERPL-SCNC: 104 MMOL/L (ref 98–107)
CHOLEST SERPL-MCNC: 117 MG/DL (ref 0–200)
CO2 SERPL-SCNC: 26 MMOL/L (ref 22–29)
CREAT SERPL-MCNC: 1.1 MG/DL (ref 0.76–1.27)
DEPRECATED RDW RBC AUTO: 40.3 FL (ref 37–54)
EGFRCR SERPLBLD CKD-EPI 2021: 82.3 ML/MIN/1.73
EOSINOPHIL # BLD AUTO: 0.18 10*3/MM3 (ref 0–0.4)
EOSINOPHIL NFR BLD AUTO: 3.1 % (ref 0.3–6.2)
ERYTHROCYTE [DISTWIDTH] IN BLOOD BY AUTOMATED COUNT: 12.1 % (ref 12.3–15.4)
FERRITIN SERPL-MCNC: 111.8 NG/ML (ref 30–400)
GLOBULIN UR ELPH-MCNC: 3.3 GM/DL
GLUCOSE SERPL-MCNC: 95 MG/DL (ref 65–99)
HBA1C MFR BLD: 5.1 % (ref 4.8–5.6)
HCT VFR BLD AUTO: 42.9 % (ref 37.5–51)
HDLC SERPL-MCNC: 28 MG/DL (ref 40–60)
HGB BLD-MCNC: 14.8 G/DL (ref 13–17.7)
IMM GRANULOCYTES # BLD AUTO: 0.02 10*3/MM3 (ref 0–0.05)
IMM GRANULOCYTES NFR BLD AUTO: 0.3 % (ref 0–0.5)
IRON 24H UR-MRATE: 80 MCG/DL (ref 59–158)
IRON SATN MFR SERPL: 25 % (ref 20–50)
LDLC SERPL CALC-MCNC: 62 MG/DL (ref 0–100)
LDLC/HDLC SERPL: 2.06 {RATIO}
LYMPHOCYTES # BLD AUTO: 1.41 10*3/MM3 (ref 0.7–3.1)
LYMPHOCYTES NFR BLD AUTO: 24.4 % (ref 19.6–45.3)
MCH RBC QN AUTO: 31.6 PG (ref 26.6–33)
MCHC RBC AUTO-ENTMCNC: 34.5 G/DL (ref 31.5–35.7)
MCV RBC AUTO: 91.7 FL (ref 79–97)
MONOCYTES # BLD AUTO: 0.67 10*3/MM3 (ref 0.1–0.9)
MONOCYTES NFR BLD AUTO: 11.6 % (ref 5–12)
NEUTROPHILS NFR BLD AUTO: 3.45 10*3/MM3 (ref 1.7–7)
NEUTROPHILS NFR BLD AUTO: 59.6 % (ref 42.7–76)
NRBC BLD AUTO-RTO: 0 /100 WBC (ref 0–0.2)
PLATELET # BLD AUTO: 243 10*3/MM3 (ref 140–450)
PMV BLD AUTO: 10.2 FL (ref 6–12)
POTASSIUM SERPL-SCNC: 4.2 MMOL/L (ref 3.5–5.2)
PROT SERPL-MCNC: 7.7 G/DL (ref 6–8.5)
RBC # BLD AUTO: 4.68 10*6/MM3 (ref 4.14–5.8)
SODIUM SERPL-SCNC: 140 MMOL/L (ref 136–145)
T4 FREE SERPL-MCNC: 1.54 NG/DL (ref 0.93–1.7)
TIBC SERPL-MCNC: 314 MCG/DL (ref 298–536)
TRANSFERRIN SERPL-MCNC: 211 MG/DL (ref 200–360)
TRIGL SERPL-MCNC: 157 MG/DL (ref 0–150)
TSH SERPL DL<=0.05 MIU/L-ACNC: 0.02 UIU/ML (ref 0.27–4.2)
VIT B12 BLD-MCNC: 407 PG/ML (ref 211–946)
VLDLC SERPL-MCNC: 27 MG/DL (ref 5–40)
WBC NRBC COR # BLD AUTO: 5.79 10*3/MM3 (ref 3.4–10.8)

## 2025-03-25 PROCEDURE — 83036 HEMOGLOBIN GLYCOSYLATED A1C: CPT | Performed by: INTERNAL MEDICINE

## 2025-03-25 PROCEDURE — 36415 COLL VENOUS BLD VENIPUNCTURE: CPT

## 2025-03-25 PROCEDURE — 82607 VITAMIN B-12: CPT | Performed by: INTERNAL MEDICINE

## 2025-03-25 PROCEDURE — 84439 ASSAY OF FREE THYROXINE: CPT

## 2025-03-25 PROCEDURE — 80061 LIPID PANEL: CPT | Performed by: INTERNAL MEDICINE

## 2025-03-25 PROCEDURE — 82728 ASSAY OF FERRITIN: CPT

## 2025-03-25 PROCEDURE — 84403 ASSAY OF TOTAL TESTOSTERONE: CPT

## 2025-03-25 PROCEDURE — 84402 ASSAY OF FREE TESTOSTERONE: CPT

## 2025-03-25 PROCEDURE — 84466 ASSAY OF TRANSFERRIN: CPT | Performed by: INTERNAL MEDICINE

## 2025-03-25 PROCEDURE — 83540 ASSAY OF IRON: CPT | Performed by: INTERNAL MEDICINE

## 2025-03-25 PROCEDURE — 80050 GENERAL HEALTH PANEL: CPT | Performed by: INTERNAL MEDICINE

## 2025-03-25 PROCEDURE — 82306 VITAMIN D 25 HYDROXY: CPT

## 2025-03-25 PROCEDURE — 82043 UR ALBUMIN QUANTITATIVE: CPT

## 2025-03-25 PROCEDURE — 84153 ASSAY OF PSA TOTAL: CPT

## 2025-03-26 LAB
PSA SERPL-MCNC: 0.8 NG/ML (ref 0–4)
REFLEX: NORMAL

## 2025-03-31 LAB
TESTOST FREE SERPL-MCNC: 7.3 PG/ML (ref 6.8–21.5)
TESTOST SERPL-MCNC: 354 NG/DL (ref 264–916)

## 2025-04-24 ENCOUNTER — TRANSCRIBE ORDERS (OUTPATIENT)
Dept: ADMINISTRATIVE | Facility: HOSPITAL | Age: 50
End: 2025-04-24
Payer: COMMERCIAL

## 2025-04-24 DIAGNOSIS — E11.9 TYPE 2 DIABETES MELLITUS WITHOUT COMPLICATION, UNSPECIFIED WHETHER LONG TERM INSULIN USE: Primary | ICD-10-CM

## 2025-05-01 ENCOUNTER — TRANSCRIBE ORDERS (OUTPATIENT)
Dept: ADMINISTRATIVE | Facility: HOSPITAL | Age: 50
End: 2025-05-01
Payer: COMMERCIAL

## 2025-05-01 DIAGNOSIS — E11.9 TYPE 2 DIABETES MELLITUS WITHOUT COMPLICATION, UNSPECIFIED WHETHER LONG TERM INSULIN USE: ICD-10-CM

## 2025-05-01 DIAGNOSIS — Z00.00 ENCOUNTER FOR GENERAL ADULT MEDICAL EXAMINATION WITHOUT ABNORMAL FINDINGS: Primary | ICD-10-CM

## 2025-06-16 ENCOUNTER — LAB (OUTPATIENT)
Dept: LAB | Facility: HOSPITAL | Age: 50
End: 2025-06-16
Payer: COMMERCIAL

## 2025-06-16 ENCOUNTER — TRANSCRIBE ORDERS (OUTPATIENT)
Dept: ADMINISTRATIVE | Facility: HOSPITAL | Age: 50
End: 2025-06-16
Payer: COMMERCIAL

## 2025-06-16 DIAGNOSIS — E23.0 GROWTH HORMONE DEFICIENCY: ICD-10-CM

## 2025-06-16 DIAGNOSIS — E11.9 TYPE 2 DIABETES MELLITUS WITH FEATURES OF INSULIN RESISTANCE: ICD-10-CM

## 2025-06-16 DIAGNOSIS — E29.1 MALE HYPOGONADISM: Primary | ICD-10-CM

## 2025-06-16 DIAGNOSIS — E27.40 INSUFFICIENCY, ADRENAL: ICD-10-CM

## 2025-06-16 DIAGNOSIS — E29.1 MALE HYPOGONADISM: ICD-10-CM

## 2025-06-16 DIAGNOSIS — N40.0 PROSTATISM: ICD-10-CM

## 2025-06-16 DIAGNOSIS — E03.8 ACQUIRED CENTRAL HYPOTHYROIDISM: ICD-10-CM

## 2025-06-16 LAB
25(OH)D3 SERPL-MCNC: 30.8 NG/ML (ref 30–100)
ALBUMIN SERPL-MCNC: 4.4 G/DL (ref 3.5–5.2)
ALBUMIN UR-MCNC: <1.2 MG/DL
ALBUMIN/GLOB SERPL: 1.5 G/DL
ALP SERPL-CCNC: 91 U/L (ref 39–117)
ALT SERPL W P-5'-P-CCNC: 17 U/L (ref 1–41)
ANION GAP SERPL CALCULATED.3IONS-SCNC: 9 MMOL/L (ref 5–15)
AST SERPL-CCNC: 20 U/L (ref 1–40)
BASOPHILS # BLD AUTO: 0.04 10*3/MM3 (ref 0–0.2)
BASOPHILS NFR BLD AUTO: 0.6 % (ref 0–1.5)
BILIRUB SERPL-MCNC: 0.5 MG/DL (ref 0–1.2)
BUN SERPL-MCNC: 15.8 MG/DL (ref 6–20)
BUN/CREAT SERPL: 15.5 (ref 7–25)
CALCIUM SPEC-SCNC: 9.2 MG/DL (ref 8.6–10.5)
CHLORIDE SERPL-SCNC: 105 MMOL/L (ref 98–107)
CHOLEST SERPL-MCNC: 125 MG/DL (ref 0–200)
CO2 SERPL-SCNC: 25 MMOL/L (ref 22–29)
CREAT SERPL-MCNC: 1.02 MG/DL (ref 0.76–1.27)
CREAT UR-MCNC: 163.3 MG/DL
DEPRECATED RDW RBC AUTO: 41.1 FL (ref 37–54)
EGFRCR SERPLBLD CKD-EPI 2021: 90.1 ML/MIN/1.73
EOSINOPHIL # BLD AUTO: 0.19 10*3/MM3 (ref 0–0.4)
EOSINOPHIL NFR BLD AUTO: 3.1 % (ref 0.3–6.2)
ERYTHROCYTE [DISTWIDTH] IN BLOOD BY AUTOMATED COUNT: 12.2 % (ref 12.3–15.4)
GLOBULIN UR ELPH-MCNC: 2.9 GM/DL
GLUCOSE SERPL-MCNC: 98 MG/DL (ref 65–99)
HBA1C MFR BLD: 5.3 % (ref 4.8–5.6)
HCT VFR BLD AUTO: 41.8 % (ref 37.5–51)
HCV AB SER QL: NORMAL
HDLC SERPL-MCNC: 29 MG/DL (ref 40–60)
HGB BLD-MCNC: 14.2 G/DL (ref 13–17.7)
IMM GRANULOCYTES # BLD AUTO: 0.02 10*3/MM3 (ref 0–0.05)
IMM GRANULOCYTES NFR BLD AUTO: 0.3 % (ref 0–0.5)
LDLC SERPL CALC-MCNC: 61 MG/DL (ref 0–100)
LDLC/HDLC SERPL: 1.83 {RATIO}
LYMPHOCYTES # BLD AUTO: 1.55 10*3/MM3 (ref 0.7–3.1)
LYMPHOCYTES NFR BLD AUTO: 25.2 % (ref 19.6–45.3)
MCH RBC QN AUTO: 31.4 PG (ref 26.6–33)
MCHC RBC AUTO-ENTMCNC: 34 G/DL (ref 31.5–35.7)
MCV RBC AUTO: 92.5 FL (ref 79–97)
MICROALBUMIN/CREAT UR: NORMAL MG/G{CREAT}
MONOCYTES # BLD AUTO: 0.65 10*3/MM3 (ref 0.1–0.9)
MONOCYTES NFR BLD AUTO: 10.6 % (ref 5–12)
NEUTROPHILS NFR BLD AUTO: 3.71 10*3/MM3 (ref 1.7–7)
NEUTROPHILS NFR BLD AUTO: 60.2 % (ref 42.7–76)
NRBC BLD AUTO-RTO: 0 /100 WBC (ref 0–0.2)
PLATELET # BLD AUTO: 242 10*3/MM3 (ref 140–450)
PMV BLD AUTO: 10.4 FL (ref 6–12)
POTASSIUM SERPL-SCNC: 3.8 MMOL/L (ref 3.5–5.2)
PROT SERPL-MCNC: 7.3 G/DL (ref 6–8.5)
RBC # BLD AUTO: 4.52 10*6/MM3 (ref 4.14–5.8)
SODIUM SERPL-SCNC: 139 MMOL/L (ref 136–145)
T4 FREE SERPL-MCNC: 1.17 NG/DL (ref 0.92–1.68)
TRIGL SERPL-MCNC: 215 MG/DL (ref 0–150)
TSH SERPL DL<=0.05 MIU/L-ACNC: 0.02 UIU/ML (ref 0.27–4.2)
URATE SERPL-MCNC: 7.3 MG/DL (ref 3.4–7)
VIT B12 BLD-MCNC: 324 PG/ML (ref 211–946)
VLDLC SERPL-MCNC: 35 MG/DL (ref 5–40)
WBC NRBC COR # BLD AUTO: 6.16 10*3/MM3 (ref 3.4–10.8)

## 2025-06-16 PROCEDURE — 84153 ASSAY OF PSA TOTAL: CPT

## 2025-06-16 PROCEDURE — 80050 GENERAL HEALTH PANEL: CPT | Performed by: INTERNAL MEDICINE

## 2025-06-16 PROCEDURE — 82306 VITAMIN D 25 HYDROXY: CPT

## 2025-06-16 PROCEDURE — 82043 UR ALBUMIN QUANTITATIVE: CPT | Performed by: INTERNAL MEDICINE

## 2025-06-16 PROCEDURE — 84402 ASSAY OF FREE TESTOSTERONE: CPT

## 2025-06-16 PROCEDURE — 82607 VITAMIN B-12: CPT | Performed by: INTERNAL MEDICINE

## 2025-06-16 PROCEDURE — 84439 ASSAY OF FREE THYROXINE: CPT | Performed by: INTERNAL MEDICINE

## 2025-06-16 PROCEDURE — 86803 HEPATITIS C AB TEST: CPT | Performed by: INTERNAL MEDICINE

## 2025-06-16 PROCEDURE — 84550 ASSAY OF BLOOD/URIC ACID: CPT | Performed by: INTERNAL MEDICINE

## 2025-06-16 PROCEDURE — 83036 HEMOGLOBIN GLYCOSYLATED A1C: CPT | Performed by: INTERNAL MEDICINE

## 2025-06-16 PROCEDURE — 82570 ASSAY OF URINE CREATININE: CPT | Performed by: INTERNAL MEDICINE

## 2025-06-16 PROCEDURE — 84403 ASSAY OF TOTAL TESTOSTERONE: CPT

## 2025-06-16 PROCEDURE — 80061 LIPID PANEL: CPT | Performed by: INTERNAL MEDICINE

## 2025-06-17 LAB
PSA SERPL-MCNC: 0.7 NG/ML (ref 0–4)
REFLEX: NORMAL

## 2025-06-19 LAB
TESTOST FREE SERPL-MCNC: 4.3 PG/ML (ref 6.8–21.5)
TESTOST SERPL-MCNC: 354 NG/DL (ref 264–916)

## (undated) DEVICE — YANKAUER,BULB TIP WITH VENT: Brand: ARGYLE

## (undated) DEVICE — Device: Brand: DEFENDO AIR/WATER/SUCTION AND BIOPSY VALVE

## (undated) DEVICE — TBG SMPL FLTR LINE NASL 02/C02 A/ BX/100

## (undated) DEVICE — MASK,OXYGEN,MED CONC,ADLT,7' TUB, UC: Brand: PENDING

## (undated) DEVICE — CUFF,BP,DISP,1 TUBE,ADULT,HP: Brand: MEDLINE

## (undated) DEVICE — SENSR O2 OXIMAX FNGR A/ 18IN NONSTR

## (undated) DEVICE — THE CHANNEL CLEANING BRUSH IS A NYLON FLEXI BRUSH ATTACHED TO A FLEXIBLE PLASTIC SHEATH DESIGNED TO SAFELY REMOVE DEBRIS FROM FLEXIBLE ENDOSCOPES.

## (undated) DEVICE — CONMED SCOPE SAVER BITE BLOCK, 20X27 MM: Brand: SCOPE SAVER

## (undated) DEVICE — ARGYLE YANKAUER BULB TIP WITH VENT: Brand: ARGYLE

## (undated) DEVICE — ENDOGATOR AUXILIARY WATER JET CONNECTOR: Brand: ENDOGATOR